# Patient Record
Sex: FEMALE | Race: WHITE | Employment: PART TIME | URBAN - NONMETROPOLITAN AREA
[De-identification: names, ages, dates, MRNs, and addresses within clinical notes are randomized per-mention and may not be internally consistent; named-entity substitution may affect disease eponyms.]

---

## 2017-02-10 PROBLEM — R00.0 TACHYCARDIA: Status: ACTIVE | Noted: 2017-02-10

## 2017-02-10 PROBLEM — J10.1 INFLUENZA A: Status: ACTIVE | Noted: 2017-02-10

## 2017-02-10 PROBLEM — J06.9 URTI (INFECTION OF THE UPPER RESPIRATORY TRACT): Status: ACTIVE | Noted: 2017-02-10

## 2017-02-11 PROBLEM — R00.0 TACHYCARDIA: Status: ACTIVE | Noted: 2017-02-11

## 2017-02-22 RX ORDER — BLOOD PRESSURE TEST KIT
1 KIT MISCELLANEOUS PRN
Qty: 1 KIT | Refills: 0 | Status: SHIPPED | OUTPATIENT
Start: 2017-02-22 | End: 2017-03-01 | Stop reason: SDUPTHER

## 2017-03-01 ENCOUNTER — OFFICE VISIT (OUTPATIENT)
Dept: CARDIOLOGY | Age: 37
End: 2017-03-01

## 2017-03-01 VITALS
HEART RATE: 116 BPM | SYSTOLIC BLOOD PRESSURE: 120 MMHG | DIASTOLIC BLOOD PRESSURE: 86 MMHG | HEIGHT: 60 IN | BODY MASS INDEX: 41.03 KG/M2 | WEIGHT: 209 LBS

## 2017-03-01 DIAGNOSIS — E66.01 MORBID OBESITY DUE TO EXCESS CALORIES (HCC): ICD-10-CM

## 2017-03-01 DIAGNOSIS — E11.9 TYPE 2 DIABETES MELLITUS WITHOUT COMPLICATION, WITH LONG-TERM CURRENT USE OF INSULIN (HCC): ICD-10-CM

## 2017-03-01 DIAGNOSIS — R00.0 TACHYCARDIA: Primary | ICD-10-CM

## 2017-03-01 DIAGNOSIS — I10 ESSENTIAL HYPERTENSION: ICD-10-CM

## 2017-03-01 DIAGNOSIS — Z79.4 TYPE 2 DIABETES MELLITUS WITHOUT COMPLICATION, WITH LONG-TERM CURRENT USE OF INSULIN (HCC): ICD-10-CM

## 2017-03-01 PROCEDURE — 93000 ELECTROCARDIOGRAM COMPLETE: CPT | Performed by: PHYSICIAN ASSISTANT

## 2017-03-01 PROCEDURE — 99213 OFFICE O/P EST LOW 20 MIN: CPT | Performed by: PHYSICIAN ASSISTANT

## 2017-03-01 RX ORDER — BLOOD PRESSURE TEST KIT
1 KIT MISCELLANEOUS PRN
Qty: 1 KIT | Refills: 0 | Status: SHIPPED | OUTPATIENT
Start: 2017-03-01

## 2017-03-06 ENCOUNTER — TELEPHONE (OUTPATIENT)
Dept: CARDIOLOGY | Age: 37
End: 2017-03-06

## 2017-03-14 ENCOUNTER — TELEPHONE (OUTPATIENT)
Dept: CARDIOLOGY | Age: 37
End: 2017-03-14

## 2017-03-23 ENCOUNTER — OFFICE VISIT (OUTPATIENT)
Dept: CARDIOLOGY | Age: 37
End: 2017-03-23

## 2017-03-23 VITALS
HEIGHT: 60 IN | BODY MASS INDEX: 41.58 KG/M2 | DIASTOLIC BLOOD PRESSURE: 72 MMHG | WEIGHT: 211.8 LBS | HEART RATE: 108 BPM | SYSTOLIC BLOOD PRESSURE: 124 MMHG

## 2017-03-23 DIAGNOSIS — R94.31 ABNORMAL HOLTER EXAM: ICD-10-CM

## 2017-03-23 DIAGNOSIS — R00.2 INTERMITTENT PALPITATIONS: ICD-10-CM

## 2017-03-23 DIAGNOSIS — R00.0 INAPPROPRIATE SINUS TACHYCARDIA: Primary | ICD-10-CM

## 2017-03-23 DIAGNOSIS — I10 ESSENTIAL HYPERTENSION: ICD-10-CM

## 2017-03-23 PROBLEM — I47.11 INAPPROPRIATE SINUS TACHYCARDIA (HCC): Status: ACTIVE | Noted: 2017-03-23

## 2017-03-23 PROCEDURE — 99214 OFFICE O/P EST MOD 30 MIN: CPT | Performed by: INTERNAL MEDICINE

## 2017-03-23 RX ORDER — LISINOPRIL 10 MG/1
10 TABLET ORAL DAILY
Qty: 30 TABLET | Refills: 5
Start: 2017-03-23 | End: 2017-03-23 | Stop reason: SDUPTHER

## 2017-03-23 RX ORDER — LISINOPRIL 10 MG/1
10 TABLET ORAL DAILY
Qty: 30 TABLET | Refills: 3 | Status: SHIPPED | OUTPATIENT
Start: 2017-03-23 | End: 2017-07-28 | Stop reason: SDUPTHER

## 2017-03-23 RX ORDER — AMLODIPINE BESYLATE 5 MG/1
5 TABLET ORAL DAILY
COMMUNITY
Start: 2017-01-24 | End: 2017-03-23

## 2017-03-23 RX ORDER — LISINOPRIL AND HYDROCHLOROTHIAZIDE 25; 20 MG/1; MG/1
1 TABLET ORAL DAILY
COMMUNITY
Start: 2017-01-24 | End: 2017-03-23

## 2017-04-05 ENCOUNTER — OFFICE VISIT (OUTPATIENT)
Dept: CARDIOLOGY | Age: 37
End: 2017-04-05

## 2017-04-05 VITALS
HEIGHT: 60 IN | HEART RATE: 97 BPM | DIASTOLIC BLOOD PRESSURE: 70 MMHG | SYSTOLIC BLOOD PRESSURE: 128 MMHG | WEIGHT: 214.13 LBS | BODY MASS INDEX: 42.04 KG/M2

## 2017-04-05 DIAGNOSIS — R00.0 TACHYCARDIA: Primary | ICD-10-CM

## 2017-04-05 PROCEDURE — 93000 ELECTROCARDIOGRAM COMPLETE: CPT | Performed by: INTERNAL MEDICINE

## 2017-04-05 PROCEDURE — 99204 OFFICE O/P NEW MOD 45 MIN: CPT | Performed by: INTERNAL MEDICINE

## 2017-04-05 ASSESSMENT — ENCOUNTER SYMPTOMS
RIGHT EYE: 0
BACK PAIN: 0
SORE THROAT: 0
BLURRED VISION: 0
DIARRHEA: 0
DOUBLE VISION: 0
VOMITING: 0
NAUSEA: 0
ABDOMINAL PAIN: 0
COUGH: 0
CONSTIPATION: 0
LEFT EYE: 0
WHEEZING: 0
SHORTNESS OF BREATH: 0

## 2017-07-18 ENCOUNTER — TELEPHONE (OUTPATIENT)
Dept: CARDIOLOGY CLINIC | Age: 37
End: 2017-07-18

## 2017-07-31 RX ORDER — LISINOPRIL 10 MG/1
TABLET ORAL
Qty: 30 TABLET | Refills: 0 | Status: SHIPPED | OUTPATIENT
Start: 2017-07-31 | End: 2017-08-01 | Stop reason: SDUPTHER

## 2017-08-01 RX ORDER — LISINOPRIL 10 MG/1
TABLET ORAL
Qty: 30 TABLET | Refills: 1 | Status: SHIPPED | OUTPATIENT
Start: 2017-08-01

## 2017-09-22 ENCOUNTER — TELEPHONE (OUTPATIENT)
Dept: CARDIOLOGY CLINIC | Age: 37
End: 2017-09-22

## 2018-05-05 ENCOUNTER — HOSPITAL ENCOUNTER (EMERGENCY)
Age: 38
Discharge: HOME | End: 2018-05-05
Payer: COMMERCIAL

## 2018-05-05 ENCOUNTER — APPOINTMENT (OUTPATIENT)
Dept: GENERAL RADIOLOGY | Age: 38
End: 2018-05-05
Payer: COMMERCIAL

## 2018-05-05 VITALS
TEMPERATURE: 98.6 F | HEART RATE: 111 BPM | RESPIRATION RATE: 18 BRPM | SYSTOLIC BLOOD PRESSURE: 148 MMHG | BODY MASS INDEX: 41.23 KG/M2 | HEIGHT: 60 IN | WEIGHT: 210 LBS | DIASTOLIC BLOOD PRESSURE: 90 MMHG | OXYGEN SATURATION: 95 %

## 2018-05-05 DIAGNOSIS — S20.212A RIB CONTUSION, LEFT, INITIAL ENCOUNTER: Primary | ICD-10-CM

## 2018-05-05 DIAGNOSIS — Z86.79 HX OF SINUS TACHYCARDIA: ICD-10-CM
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
## 2018-05-05 PROCEDURE — 6370000000 HC RX 637 (ALT 250 FOR IP): Performed by: PHYSICIAN ASSISTANT

## 2018-05-05 PROCEDURE — 6360000002 HC RX W HCPCS: Performed by: PHYSICIAN ASSISTANT

## 2018-05-05 PROCEDURE — 96372 THER/PROPH/DIAG INJ SC/IM: CPT

## 2018-05-05 PROCEDURE — 99283 EMERGENCY DEPT VISIT LOW MDM: CPT

## 2018-05-05 PROCEDURE — 71101 X-RAY EXAM UNILAT RIBS/CHEST: CPT

## 2018-05-05 RX ORDER — ORPHENADRINE CITRATE 30 MG/ML
60 INJECTION INTRAMUSCULAR; INTRAVENOUS ONCE
Status: COMPLETED | OUTPATIENT
Start: 2018-05-05 | End: 2018-05-05

## 2018-05-05 RX ORDER — IBUPROFEN 800 MG/1
800 TABLET ORAL ONCE
Status: COMPLETED | OUTPATIENT
Start: 2018-05-05 | End: 2018-05-05

## 2018-05-05 RX ORDER — IBUPROFEN 800 MG/1
800 TABLET ORAL EVERY 8 HOURS PRN
Qty: 15 TABLET | Refills: 0 | Status: SHIPPED | OUTPATIENT
Start: 2018-05-05 | End: 2019-01-09 | Stop reason: ALTCHOICE

## 2018-05-05 RX ORDER — ORPHENADRINE CITRATE 100 MG/1
100 TABLET, EXTENDED RELEASE ORAL 2 TIMES DAILY
Qty: 10 TABLET | Refills: 0 | Status: SHIPPED | OUTPATIENT
Start: 2018-05-05 | End: 2018-06-29 | Stop reason: ALTCHOICE

## 2018-05-05 RX ADMIN — IBUPROFEN 800 MG: 800 TABLET ORAL at 14:00

## 2018-05-05 RX ADMIN — ORPHENADRINE CITRATE 60 MG: 30 INJECTION INTRAMUSCULAR; INTRAVENOUS at 14:00

## 2018-05-05 ASSESSMENT — ENCOUNTER SYMPTOMS
SORE THROAT: 0
NAUSEA: 0
EYE PAIN: 0
COUGH: 0
RHINORRHEA: 0
BACK PAIN: 0
WHEEZING: 0
SHORTNESS OF BREATH: 0
VOMITING: 0
ABDOMINAL PAIN: 0
EYE DISCHARGE: 0
DIARRHEA: 0

## 2018-05-05 ASSESSMENT — PAIN DESCRIPTION - ORIENTATION: ORIENTATION: LEFT

## 2018-05-05 ASSESSMENT — PAIN DESCRIPTION - LOCATION: LOCATION: RIB CAGE

## 2018-05-05 ASSESSMENT — PAIN SCALES - GENERAL
PAINLEVEL_OUTOF10: 7
PAINLEVEL_OUTOF10: 7

## 2018-05-05 ASSESSMENT — PAIN DESCRIPTION - PAIN TYPE: TYPE: ACUTE PAIN

## 2018-06-29 ENCOUNTER — OFFICE VISIT (OUTPATIENT)
Dept: CARDIOLOGY CLINIC | Age: 38
End: 2018-06-29
Payer: COMMERCIAL

## 2018-06-29 ENCOUNTER — TELEPHONE (OUTPATIENT)
Dept: CARDIOLOGY CLINIC | Age: 38
End: 2018-06-29

## 2018-06-29 VITALS
BODY MASS INDEX: 44.27 KG/M2 | SYSTOLIC BLOOD PRESSURE: 124 MMHG | WEIGHT: 225.5 LBS | HEART RATE: 116 BPM | DIASTOLIC BLOOD PRESSURE: 84 MMHG | HEIGHT: 60 IN

## 2018-06-29 DIAGNOSIS — R94.31 ABNORMAL HOLTER EXAM: ICD-10-CM

## 2018-06-29 DIAGNOSIS — R00.0 INAPPROPRIATE SINUS TACHYCARDIA: Primary | ICD-10-CM

## 2018-06-29 DIAGNOSIS — R00.2 INTERMITTENT PALPITATIONS: ICD-10-CM

## 2018-06-29 DIAGNOSIS — I10 ESSENTIAL HYPERTENSION: ICD-10-CM

## 2018-06-29 PROCEDURE — G8427 DOCREV CUR MEDS BY ELIG CLIN: HCPCS | Performed by: INTERNAL MEDICINE

## 2018-06-29 PROCEDURE — 4004F PT TOBACCO SCREEN RCVD TLK: CPT | Performed by: INTERNAL MEDICINE

## 2018-06-29 PROCEDURE — 99214 OFFICE O/P EST MOD 30 MIN: CPT | Performed by: INTERNAL MEDICINE

## 2018-06-29 PROCEDURE — 93000 ELECTROCARDIOGRAM COMPLETE: CPT | Performed by: INTERNAL MEDICINE

## 2018-06-29 PROCEDURE — G8419 CALC BMI OUT NRM PARAM NOF/U: HCPCS | Performed by: INTERNAL MEDICINE

## 2018-06-29 RX ORDER — CARBAMAZEPINE 200 MG/1
200 TABLET ORAL 3 TIMES DAILY
COMMUNITY
End: 2018-11-01 | Stop reason: ALTCHOICE

## 2018-06-29 RX ORDER — MEDROXYPROGESTERONE ACETATE 150 MG/ML
150 INJECTION, SUSPENSION INTRAMUSCULAR
Status: ON HOLD | COMMUNITY
End: 2022-02-25 | Stop reason: HOSPADM

## 2018-06-29 RX ORDER — AMITRIPTYLINE HYDROCHLORIDE 10 MG/1
10 TABLET, FILM COATED ORAL NIGHTLY
Qty: 30 TABLET | Refills: 4 | Status: SHIPPED | OUTPATIENT
Start: 2018-06-29

## 2018-06-29 RX ORDER — AMITRIPTYLINE HYDROCHLORIDE 10 MG/1
10 TABLET, FILM COATED ORAL NIGHTLY
COMMUNITY
End: 2018-06-29 | Stop reason: SDUPTHER

## 2018-06-29 RX ORDER — METOPROLOL TARTRATE 100 MG/1
100 TABLET ORAL 2 TIMES DAILY
Qty: 60 TABLET | Refills: 4 | Status: SHIPPED | OUTPATIENT
Start: 2018-06-29 | End: 2018-11-01 | Stop reason: SDUPTHER

## 2018-06-29 NOTE — TELEPHONE ENCOUNTER
Patient called to inform us that the Ivabardine 5 mg po bid was not covered by insurance. VO per Dr. Marylou Greer to change to cardizem 30 mg tid. Prescription phoned in to [facility]. Dr. Marylou Greer please agree to VO.

## 2018-07-05 ENCOUNTER — TELEPHONE (OUTPATIENT)
Dept: CARDIOLOGY CLINIC | Age: 38
End: 2018-07-05

## 2018-07-05 NOTE — TELEPHONE ENCOUNTER
Received fax from [address] [place] [address]. Carbamazepine and Diltiazem HCL combination appears to inhibit the hepatic metabolism of carbamazepine and may lead to increase carbamazepine levels and subsequent toxicity. The combination may also lead to decreased exposure of the calcium channel blocker. Anything needed?

## 2018-07-06 NOTE — TELEPHONE ENCOUNTER
Pt called back, and states she is taking carbamazepine. She states umang jason is writing the script. Left detailed message on  Their  Office vm.

## 2018-07-09 NOTE — TELEPHONE ENCOUNTER
ROB for Pancho Caprice, CNP team to call our office back. Want to make sure they received our message.

## 2018-08-08 ENCOUNTER — APPOINTMENT (OUTPATIENT)
Dept: GENERAL RADIOLOGY | Age: 38
End: 2018-08-08
Payer: COMMERCIAL

## 2018-08-08 ENCOUNTER — HOSPITAL ENCOUNTER (EMERGENCY)
Age: 38
Discharge: HOME | End: 2018-08-08
Payer: COMMERCIAL

## 2018-08-08 VITALS
BODY MASS INDEX: 43.19 KG/M2 | WEIGHT: 220 LBS | OXYGEN SATURATION: 94 % | HEIGHT: 60 IN | HEART RATE: 112 BPM | TEMPERATURE: 97.4 F | DIASTOLIC BLOOD PRESSURE: 63 MMHG | RESPIRATION RATE: 20 BRPM | SYSTOLIC BLOOD PRESSURE: 117 MMHG

## 2018-08-08 DIAGNOSIS — M54.6 MIDLINE THORACIC BACK PAIN, UNSPECIFIED CHRONICITY: Primary | ICD-10-CM

## 2018-08-08 LAB
ALBUMIN SERPL-MCNC: 3.7 G/DL (ref 3.5–5.1)
ALP BLD-CCNC: 88 U/L (ref 38–126)
ALT SERPL-CCNC: 17 U/L (ref 11–66)
AMPHETAMINE+METHAMPHETAMINE URINE SCREEN: NEGATIVE
ANION GAP SERPL CALCULATED.3IONS-SCNC: 15 MEQ/L (ref 8–16)
AST SERPL-CCNC: 12 U/L (ref 5–40)
BACTERIA: ABNORMAL /HPF
BARBITURATE QUANTITATIVE URINE: NEGATIVE
BASOPHILS # BLD: 0.7 %
BASOPHILS ABSOLUTE: 0.1 THOU/MM3 (ref 0–0.1)
BENZODIAZEPINE QUANTITATIVE URINE: NEGATIVE
BILIRUB SERPL-MCNC: 0.5 MG/DL (ref 0.3–1.2)
BILIRUBIN DIRECT: < 0.2 MG/DL (ref 0–0.3)
BILIRUBIN URINE: NEGATIVE
BLOOD, URINE: ABNORMAL
BUN BLDV-MCNC: 7 MG/DL (ref 7–22)
CALCIUM SERPL-MCNC: 9.2 MG/DL (ref 8.5–10.5)
CANNABINOID QUANTITATIVE URINE: NEGATIVE
CASTS 2: ABNORMAL /LPF
CASTS UA: ABNORMAL /LPF
CHARACTER, URINE: ABNORMAL
CHLORIDE BLD-SCNC: 97 MEQ/L (ref 98–111)
CO2: 24 MEQ/L (ref 23–33)
COCAINE METABOLITE QUANTITATIVE URINE: NEGATIVE
COLOR: YELLOW
CREAT SERPL-MCNC: 0.5 MG/DL (ref 0.4–1.2)
CRYSTALS, UA: ABNORMAL
D-DIMER QUANTITATIVE: 377 NG/ML FEU (ref 0–500)
EKG ATRIAL RATE: 122 BPM
EKG P AXIS: 62 DEGREES
EKG P-R INTERVAL: 122 MS
EKG Q-T INTERVAL: 330 MS
EKG QRS DURATION: 82 MS
EKG QTC CALCULATION (BAZETT): 470 MS
EKG R AXIS: 54 DEGREES
EKG T AXIS: 42 DEGREES
EKG VENTRICULAR RATE: 122 BPM
EOSINOPHIL # BLD: 1.1 %
EOSINOPHILS ABSOLUTE: 0.1 THOU/MM3 (ref 0–0.4)
EPITHELIAL CELLS, UA: ABNORMAL /HPF
ERYTHROCYTE [DISTWIDTH] IN BLOOD BY AUTOMATED COUNT: 11.9 % (ref 11.5–14.5)
ERYTHROCYTE [DISTWIDTH] IN BLOOD BY AUTOMATED COUNT: 36.3 FL (ref 35–45)
GFR SERPL CREATININE-BSD FRML MDRD: > 90 ML/MIN/1.73M2
GLUCOSE BLD-MCNC: 288 MG/DL (ref 70–108)
GLUCOSE BLD-MCNC: 316 MG/DL (ref 70–108)
GLUCOSE URINE: >= 1000 MG/DL
HCT VFR BLD CALC: 46.7 % (ref 37–47)
HEMOGLOBIN: 16.5 GM/DL (ref 12–16)
IMMATURE GRANS (ABS): 0.05 THOU/MM3 (ref 0–0.07)
IMMATURE GRANULOCYTES: 0.4 %
KETONES, URINE: NEGATIVE
LEUKOCYTE ESTERASE, URINE: NEGATIVE
LIPASE: 43.8 U/L (ref 5.6–51.3)
LYMPHOCYTES # BLD: 32.1 %
LYMPHOCYTES ABSOLUTE: 4.2 THOU/MM3 (ref 1–4.8)
MCH RBC QN AUTO: 30 PG (ref 26–33)
MCHC RBC AUTO-ENTMCNC: 35.3 GM/DL (ref 32.2–35.5)
MCV RBC AUTO: 84.9 FL (ref 81–99)
MISCELLANEOUS 2: ABNORMAL
MONOCYTES # BLD: 5.2 %
MONOCYTES ABSOLUTE: 0.7 THOU/MM3 (ref 0.4–1.3)
NITRITE, URINE: NEGATIVE
NUCLEATED RED BLOOD CELLS: 0 /100 WBC
OPIATES, URINE: NEGATIVE
OSMOLALITY CALCULATION: 282 MOSMOL/KG (ref 275–300)
OXYCODONE: NEGATIVE
PH UA: 5.5
PHENCYCLIDINE QUANTITATIVE URINE: NEGATIVE
PLATELET # BLD: 370 THOU/MM3 (ref 130–400)
PLATELET ESTIMATE: ADEQUATE
PMV BLD AUTO: 9.8 FL (ref 9.4–12.4)
POTASSIUM REFLEX MAGNESIUM: 3.9 MEQ/L (ref 3.5–5.2)
PREGNANCY, SERUM: NEGATIVE
PROCALCITONIN: 0.05 NG/ML (ref 0.01–0.09)
PROTEIN UA: 100
RBC # BLD: 5.5 MILL/MM3 (ref 4.2–5.4)
RBC URINE: ABNORMAL /HPF
RENAL EPITHELIAL, UA: ABNORMAL
SCAN OF BLOOD SMEAR: NORMAL
SEG NEUTROPHILS: 60.5 %
SEGMENTED NEUTROPHILS ABSOLUTE COUNT: 7.9 THOU/MM3 (ref 1.8–7.7)
SODIUM BLD-SCNC: 136 MEQ/L (ref 135–145)
SPECIFIC GRAVITY, URINE: 1.02 (ref 1–1.03)
TOTAL PROTEIN: 7.2 G/DL (ref 6.1–8)
TROPONIN T: < 0.01 NG/ML
UROBILINOGEN, URINE: 1 EU/DL
WBC # BLD: 13 THOU/MM3 (ref 4.8–10.8)
WBC UA: ABNORMAL /HPF
YEAST: ABNORMAL

## 2018-08-08 PROCEDURE — 80307 DRUG TEST PRSMV CHEM ANLYZR: CPT

## 2018-08-08 PROCEDURE — 71046 X-RAY EXAM CHEST 2 VIEWS: CPT

## 2018-08-08 PROCEDURE — 87086 URINE CULTURE/COLONY COUNT: CPT

## 2018-08-08 PROCEDURE — 84145 PROCALCITONIN (PCT): CPT

## 2018-08-08 PROCEDURE — 83690 ASSAY OF LIPASE: CPT

## 2018-08-08 PROCEDURE — 85025 COMPLETE CBC W/AUTO DIFF WBC: CPT

## 2018-08-08 PROCEDURE — 6360000002 HC RX W HCPCS: Performed by: PHYSICIAN ASSISTANT

## 2018-08-08 PROCEDURE — 36415 COLL VENOUS BLD VENIPUNCTURE: CPT

## 2018-08-08 PROCEDURE — 96374 THER/PROPH/DIAG INJ IV PUSH: CPT

## 2018-08-08 PROCEDURE — 80048 BASIC METABOLIC PNL TOTAL CA: CPT

## 2018-08-08 PROCEDURE — 84484 ASSAY OF TROPONIN QUANT: CPT

## 2018-08-08 PROCEDURE — 81001 URINALYSIS AUTO W/SCOPE: CPT

## 2018-08-08 PROCEDURE — 80076 HEPATIC FUNCTION PANEL: CPT

## 2018-08-08 PROCEDURE — 99285 EMERGENCY DEPT VISIT HI MDM: CPT

## 2018-08-08 PROCEDURE — 82948 REAGENT STRIP/BLOOD GLUCOSE: CPT

## 2018-08-08 PROCEDURE — 85379 FIBRIN DEGRADATION QUANT: CPT

## 2018-08-08 PROCEDURE — 93005 ELECTROCARDIOGRAM TRACING: CPT | Performed by: PHYSICIAN ASSISTANT

## 2018-08-08 PROCEDURE — 2580000003 HC RX 258: Performed by: PHYSICIAN ASSISTANT

## 2018-08-08 PROCEDURE — 94640 AIRWAY INHALATION TREATMENT: CPT

## 2018-08-08 PROCEDURE — 84703 CHORIONIC GONADOTROPIN ASSAY: CPT

## 2018-08-08 RX ORDER — 0.9 % SODIUM CHLORIDE 0.9 %
1000 INTRAVENOUS SOLUTION INTRAVENOUS ONCE
Status: COMPLETED | OUTPATIENT
Start: 2018-08-08 | End: 2018-08-08

## 2018-08-08 RX ORDER — METHYLPREDNISOLONE SODIUM SUCCINATE 125 MG/2ML
80 INJECTION, POWDER, LYOPHILIZED, FOR SOLUTION INTRAMUSCULAR; INTRAVENOUS ONCE
Status: COMPLETED | OUTPATIENT
Start: 2018-08-08 | End: 2018-08-08

## 2018-08-08 RX ADMIN — ALBUTEROL SULFATE 2.5 MG: 2.5 SOLUTION RESPIRATORY (INHALATION) at 13:21

## 2018-08-08 RX ADMIN — SODIUM CHLORIDE 1000 ML: 9 INJECTION, SOLUTION INTRAVENOUS at 13:29

## 2018-08-08 RX ADMIN — METHYLPREDNISOLONE SODIUM SUCCINATE 80 MG: 125 INJECTION, POWDER, FOR SOLUTION INTRAMUSCULAR; INTRAVENOUS at 13:29

## 2018-08-08 ASSESSMENT — ENCOUNTER SYMPTOMS
COUGH: 1
RHINORRHEA: 0
DIARRHEA: 0
BACK PAIN: 1
TROUBLE SWALLOWING: 0
ABDOMINAL PAIN: 0
SORE THROAT: 0
WHEEZING: 0
VOMITING: 0
NAUSEA: 0
EYE DISCHARGE: 0
SHORTNESS OF BREATH: 0
EYE PAIN: 0

## 2018-08-08 ASSESSMENT — PAIN DESCRIPTION - DESCRIPTORS: DESCRIPTORS: ACHING;BURNING

## 2018-08-08 ASSESSMENT — PAIN DESCRIPTION - FREQUENCY: FREQUENCY: CONTINUOUS

## 2018-08-08 ASSESSMENT — PAIN DESCRIPTION - PAIN TYPE: TYPE: ACUTE PAIN

## 2018-08-08 ASSESSMENT — PAIN DESCRIPTION - LOCATION: LOCATION: BACK

## 2018-08-08 ASSESSMENT — PAIN SCALES - GENERAL: PAINLEVEL_OUTOF10: 7

## 2018-08-08 NOTE — ED PROVIDER NOTES
for appetite change, chills, fatigue and fever. HENT: Negative for congestion, ear pain, rhinorrhea, sore throat and trouble swallowing. Eyes: Negative for pain, discharge and visual disturbance. Respiratory: Positive for cough (productive). Negative for shortness of breath and wheezing. Cardiovascular: Positive for chest pain (only when she coughs). Negative for palpitations and leg swelling. Gastrointestinal: Negative for abdominal pain, diarrhea, nausea and vomiting. Genitourinary: Negative for difficulty urinating, dysuria, frequency, hematuria, urgency and vaginal discharge. Musculoskeletal: Positive for back pain. Negative for arthralgias, joint swelling and neck pain. Skin: Negative for pallor and rash. Neurological: Negative for dizziness, syncope, weakness, light-headedness, numbness and headaches. Hematological: Negative for adenopathy. Psychiatric/Behavioral: Negative for confusion and suicidal ideas. The patient is not nervous/anxious. PAST MEDICAL HISTORY    has a past medical history of Anxiety; Depression; Diabetes mellitus ([facility] [address] Hyperlipidemia; and Hypertension. SURGICAL HISTORY      has a past surgical history that includes Abscess Drainage.     CURRENT MEDICATIONS       Discharge Medication List as of 8/8/2018  3:46 PM      CONTINUE these medications which have NOT CHANGED    Details   medroxyPROGESTERone (DEPO-PROVERA) 150 MG/ML injection Inject 150 mg into the muscle every 3 monthsHistorical Med      carBAMazepine (TEGRETOL) 200 MG tablet Take 200 mg by mouth 3 times dailyHistorical Med      metoprolol (LOPRESSOR) 100 MG tablet Take 1 tablet by mouth 2 times daily Hold sbp less 110 HR less than 55, Disp-60 tablet, R-4Normal      amitriptyline (ELAVIL) 10 MG tablet Take 1 tablet by mouth nightly, Disp-30 tablet, R-4Normal      diltiazem (CARDIZEM) 30 MG tablet Take 1 tablet by mouth 3 times daily, Disp-90 tablet, R-3Phone In      ibuprofen (ADVIL;MOTRIN) 800 MG Physical Exam   Constitutional: She is oriented to person, place, and time. She appears well-developed and well-nourished. No distress. Patient is obese. HENT:   Head: Normocephalic and atraumatic. Right Ear: Tympanic membrane and external ear normal.   Left Ear: Tympanic membrane and external ear normal.   Nose: Nose normal.   Mouth/Throat: Oropharynx is clear and moist. No oropharyngeal exudate, posterior oropharyngeal edema or posterior oropharyngeal erythema. Eyes: Conjunctivae are normal. Pupils are equal, round, and reactive to light. Neck: Normal range of motion. Neck supple. No spinous process tenderness and no muscular tenderness present. Cardiovascular: Regular rhythm, S1 normal and normal pulses. Tachycardia present. Exam reveals no gallop. No murmur heard. Pulses:       Radial pulses are 2+ on the right side, and 2+ on the left side. Dorsalis pedis pulses are 2+ on the right side, and 2+ on the left side. Posterior tibial pulses are 2+ on the right side, and 2+ on the left side. Pulmonary/Chest: Effort normal. She has decreased breath sounds. She has wheezes. She has no rhonchi. She has no rales. She exhibits no tenderness. Minimal expiratory wheezes. Abdominal: Soft. Bowel sounds are normal. She exhibits no distension. There is no tenderness. There is no rebound and no guarding. Musculoskeletal: Normal range of motion. She exhibits no edema or tenderness. No thoracic bony tenderness. No reproducible pain with movement. Patient states she has burning sensation with palpation across thoracic back along braw strap line, which crosses the midline. No lesions noted. Neurological: She is alert and oriented to person, place, and time. She has normal strength. No cranial nerve deficit or sensory deficit. She exhibits normal muscle tone. Coordination normal.   Skin: Skin is warm and dry. No rash noted. No erythema.    Nursing note and vitals

## 2018-08-09 LAB
ORGANISM: ABNORMAL
URINE CULTURE REFLEX: ABNORMAL

## 2018-08-09 PROCEDURE — 93010 ELECTROCARDIOGRAM REPORT: CPT | Performed by: INTERNAL MEDICINE

## 2018-09-26 PROBLEM — J10.1 INFLUENZA A: Status: RESOLVED | Noted: 2017-02-10 | Resolved: 2018-09-26

## 2018-11-01 ENCOUNTER — OFFICE VISIT (OUTPATIENT)
Dept: CARDIOLOGY CLINIC | Age: 38
End: 2018-11-01
Payer: COMMERCIAL

## 2018-11-01 VITALS
HEIGHT: 60 IN | BODY MASS INDEX: 43.9 KG/M2 | WEIGHT: 223.6 LBS | SYSTOLIC BLOOD PRESSURE: 122 MMHG | HEART RATE: 100 BPM | DIASTOLIC BLOOD PRESSURE: 82 MMHG

## 2018-11-01 DIAGNOSIS — R00.2 INTERMITTENT PALPITATIONS: Primary | ICD-10-CM

## 2018-11-01 DIAGNOSIS — R94.31 ABNORMAL HOLTER EXAM: ICD-10-CM

## 2018-11-01 DIAGNOSIS — R00.0 INAPPROPRIATE SINUS TACHYCARDIA: ICD-10-CM

## 2018-11-01 DIAGNOSIS — I10 ESSENTIAL HYPERTENSION: ICD-10-CM

## 2018-11-01 PROCEDURE — 4004F PT TOBACCO SCREEN RCVD TLK: CPT | Performed by: INTERNAL MEDICINE

## 2018-11-01 PROCEDURE — G8427 DOCREV CUR MEDS BY ELIG CLIN: HCPCS | Performed by: INTERNAL MEDICINE

## 2018-11-01 PROCEDURE — G8484 FLU IMMUNIZE NO ADMIN: HCPCS | Performed by: INTERNAL MEDICINE

## 2018-11-01 PROCEDURE — G8417 CALC BMI ABV UP PARAM F/U: HCPCS | Performed by: INTERNAL MEDICINE

## 2018-11-01 PROCEDURE — 99214 OFFICE O/P EST MOD 30 MIN: CPT | Performed by: INTERNAL MEDICINE

## 2018-11-01 RX ORDER — METOPROLOL TARTRATE 100 MG/1
100 TABLET ORAL 2 TIMES DAILY
Qty: 180 TABLET | Refills: 3 | Status: SHIPPED | OUTPATIENT
Start: 2018-11-01

## 2018-11-01 NOTE — PROGRESS NOTES
Chief Complaint   Patient presents with    3 Month Follow-Up    Tachycardia     Originally Sinus tachycardia in a pat admitted with flu    3 month follow-up. Doing well    Pt denies:chest pain,palpitations,dizziness,peripheral edema,SOB    Pt complains of:tackycardia      Note from Dr. BR[facility] appreciated \" I discussed with the patient the option for medical management which is limited and also the option of having an ablation to modify the sinus node. Since the ablation is permanent, I want to try the medical option first.  I will provide her with samples of Corlanor since this is not covered by Medicaid. I want to see if this helps. If it does then I will discuss the option of continuing this and she will try to loose weight and stop smoking to see if her resting HR improves and if not perform a sinus node modification ablation. \"    Palpitation got better after  Corlanor  But did not cont    Denies SOB. Chest pain. Fatigue. Edema.        Patient Active Problem List   Diagnosis    Gastroenteritis    Abdominal pain, other specified site    Influenza    Tachycardia    Sepsis ([address]    Type 2 diabetes mellitus without complication, with long-term current use of insulin ([address]    Essential hypertension    Sinus tachycardia    Morbid obesity due to excess calories (HCC)    Intermittent palpitations    Abnormal Holter exam HR average is 118 bpm    Inappropriate sinus tachycardia       Past Surgical History:   Procedure Laterality Date    ABSCESS DRAINAGE         Allergies   Allergen Reactions    Codeine Hives        Family History   Problem Relation Age of Onset    High Blood Pressure Mother     Heart Disease Mother     High Cholesterol Mother     Depression Mother     Stroke Mother     Diabetes Maternal Aunt     High Blood Pressure Maternal Aunt     High Cholesterol Maternal Aunt         Social History     Social History    Marital status: Single     Spouse name: N/A    Number of children: mouth 2 times daily (with meals).  metFORMIN (GLUCOPHAGE) 1000 MG tablet Take 2,000 mg by mouth daily       diclofenac sodium 1 % GEL Apply 2 g topically 2 times daily for 10 days 1 Tube 3     No current facility-administered medications for this visit. Review of Systems -     General ROS: negative  Psychological ROS: negative  Hematological and Lymphatic ROS: No history of blood clots or bleeding disorder. Respiratory ROS: no cough, shortness of breath, or wheezing  Cardiovascular ROS: no chest pain or dyspnea on exertion  Gastrointestinal ROS: negative  Genito-Urinary ROS: no dysuria, trouble voiding, or hematuria  Musculoskeletal ROS: negative  Neurological ROS: no TIA or stroke symptoms  Dermatological ROS: negative      Blood pressure 122/82, pulse 100, height 5' (1.524 m), weight 223 lb 9.6 oz (101.4 kg). Physical Examination:    General appearance - alert, well appearing, and in no distress  Mental status - alert, oriented to person, place, and time  Neck - supple, no significant adenopathy, no JVD, or carotid bruits  Chest - clear to auscultation, no wheezes, rales or rhonchi, symmetric air entry  Heart - normal rate, regular rhythm, normal S1, S2, no murmurs, rubs, clicks or gallops  Abdomen - soft, nontender, nondistended, no masses or organomegaly  Neurological - alert, oriented, normal speech, no focal findings or movement disorder noted  Musculoskeletal - no joint tenderness, deformity or swelling  Extremities - peripheral pulses normal, no pedal edema, no clubbing or cyanosis  Skin - normal coloration and turgor, no rashes, no suspicious skin lesions noted    Lab  No results for input(s): CKTOTAL, CKMB, CKMBINDEX, TROPONINI in the last 72 hours.   CBC:   Lab Results   Component Value Date    WBC 13.0 08/08/2018    RBC 5.50 08/08/2018    RBC 4.86 05/31/2012    HGB 16.5 08/08/2018    HCT 46.7 08/08/2018    MCV 84.9 08/08/2018    MCH 30.0 08/08/2018    MCHC 35.3 08/08/2018    RDW 12.9

## 2018-11-05 ENCOUNTER — TELEPHONE (OUTPATIENT)
Dept: CARDIOLOGY CLINIC | Age: 38
End: 2018-11-05

## 2019-01-09 ENCOUNTER — HOSPITAL ENCOUNTER (EMERGENCY)
Age: 39
Discharge: HOME | End: 2019-01-09
Payer: COMMERCIAL

## 2019-01-09 VITALS
BODY MASS INDEX: 43.19 KG/M2 | HEIGHT: 60 IN | DIASTOLIC BLOOD PRESSURE: 87 MMHG | TEMPERATURE: 98.6 F | WEIGHT: 220 LBS | SYSTOLIC BLOOD PRESSURE: 152 MMHG | HEART RATE: 118 BPM | RESPIRATION RATE: 16 BRPM | OXYGEN SATURATION: 97 %

## 2019-01-09 DIAGNOSIS — K52.9 GASTROENTERITIS: Primary | ICD-10-CM

## 2019-01-09 PROCEDURE — 99212 OFFICE O/P EST SF 10 MIN: CPT

## 2019-01-09 PROCEDURE — 99212 OFFICE O/P EST SF 10 MIN: CPT | Performed by: NURSE PRACTITIONER

## 2019-01-09 RX ORDER — ONDANSETRON 4 MG/1
4 TABLET, ORALLY DISINTEGRATING ORAL EVERY 8 HOURS PRN
Qty: 6 TABLET | Refills: 0 | Status: SHIPPED | OUTPATIENT
Start: 2019-01-09

## 2019-01-09 RX ORDER — CHOLECALCIFEROL (VITAMIN D3) 125 MCG
1 CAPSULE ORAL DAILY
Qty: 30 CAPSULE | Refills: 0 | Status: SHIPPED | OUTPATIENT
Start: 2019-01-09

## 2019-01-09 RX ORDER — IBUPROFEN 800 MG/1
800 TABLET ORAL EVERY 8 HOURS PRN
Qty: 21 TABLET | Refills: 0 | Status: SHIPPED | OUTPATIENT
Start: 2019-01-09 | End: 2019-12-22

## 2019-01-09 ASSESSMENT — ENCOUNTER SYMPTOMS
SHORTNESS OF BREATH: 0
VOMITING: 1
RHINORRHEA: 0
COUGH: 0
SINUS PRESSURE: 0
ABDOMINAL PAIN: 0
DIARRHEA: 1
NAUSEA: 1
COLOR CHANGE: 0
SORE THROAT: 0

## 2019-01-09 ASSESSMENT — PAIN DESCRIPTION - DESCRIPTORS: DESCRIPTORS: HEADACHE

## 2019-01-09 ASSESSMENT — PAIN DESCRIPTION - LOCATION: LOCATION: HEAD

## 2019-01-09 ASSESSMENT — PAIN DESCRIPTION - FREQUENCY: FREQUENCY: CONTINUOUS

## 2019-02-21 ENCOUNTER — NURSE TRIAGE (OUTPATIENT)
Dept: ADMINISTRATIVE | Age: 39
End: 2019-02-21

## 2019-02-21 ENCOUNTER — HOSPITAL ENCOUNTER (EMERGENCY)
Age: 39
Discharge: HOME | End: 2019-02-21
Payer: COMMERCIAL

## 2019-02-21 ENCOUNTER — APPOINTMENT (OUTPATIENT)
Dept: GENERAL RADIOLOGY | Age: 39
End: 2019-02-21
Payer: COMMERCIAL

## 2019-02-21 VITALS
SYSTOLIC BLOOD PRESSURE: 146 MMHG | OXYGEN SATURATION: 97 % | TEMPERATURE: 98 F | RESPIRATION RATE: 16 BRPM | HEART RATE: 108 BPM | HEIGHT: 60 IN | DIASTOLIC BLOOD PRESSURE: 90 MMHG | BODY MASS INDEX: 42.8 KG/M2 | WEIGHT: 218 LBS

## 2019-02-21 DIAGNOSIS — S90.221A CONTUSION OF LESSER TOE OF RIGHT FOOT WITH DAMAGE TO NAIL, INITIAL ENCOUNTER: Primary | ICD-10-CM

## 2019-02-21 PROCEDURE — 73660 X-RAY EXAM OF TOE(S): CPT

## 2019-02-21 PROCEDURE — 99283 EMERGENCY DEPT VISIT LOW MDM: CPT

## 2019-02-21 RX ORDER — CEPHALEXIN 500 MG/1
500 CAPSULE ORAL 4 TIMES DAILY
Qty: 28 CAPSULE | Refills: 0 | Status: SHIPPED | OUTPATIENT
Start: 2019-02-21 | End: 2019-02-28

## 2019-02-21 ASSESSMENT — ENCOUNTER SYMPTOMS
COUGH: 0
NAUSEA: 0
DIARRHEA: 0
SORE THROAT: 0
EYE DISCHARGE: 0
BACK PAIN: 0
COLOR CHANGE: 1
EYE PAIN: 0
VOMITING: 0
SHORTNESS OF BREATH: 0
WHEEZING: 0
ABDOMINAL PAIN: 0
RHINORRHEA: 0

## 2019-02-21 ASSESSMENT — PAIN DESCRIPTION - PAIN TYPE: TYPE: ACUTE PAIN

## 2019-02-21 ASSESSMENT — PAIN DESCRIPTION - LOCATION: LOCATION: TOE (COMMENT WHICH ONE)

## 2019-02-21 ASSESSMENT — PAIN DESCRIPTION - DESCRIPTORS: DESCRIPTORS: ACHING

## 2019-02-21 ASSESSMENT — PAIN DESCRIPTION - FREQUENCY: FREQUENCY: CONTINUOUS

## 2019-02-21 ASSESSMENT — PAIN DESCRIPTION - ORIENTATION: ORIENTATION: RIGHT

## 2019-05-23 ENCOUNTER — HOSPITAL ENCOUNTER (EMERGENCY)
Age: 39
Discharge: HOME | End: 2019-05-23
Payer: COMMERCIAL

## 2019-05-23 ENCOUNTER — APPOINTMENT (OUTPATIENT)
Dept: GENERAL RADIOLOGY | Age: 39
End: 2019-05-23
Payer: COMMERCIAL

## 2019-05-23 VITALS
WEIGHT: 200 LBS | HEART RATE: 115 BPM | SYSTOLIC BLOOD PRESSURE: 157 MMHG | OXYGEN SATURATION: 98 % | DIASTOLIC BLOOD PRESSURE: 96 MMHG | HEIGHT: 60 IN | BODY MASS INDEX: 39.27 KG/M2 | TEMPERATURE: 98.1 F | RESPIRATION RATE: 18 BRPM

## 2019-05-23 DIAGNOSIS — R00.0 TACHYCARDIA: ICD-10-CM

## 2019-05-23 DIAGNOSIS — S20.212A RIB CONTUSION, LEFT, INITIAL ENCOUNTER: Primary | ICD-10-CM

## 2019-05-23 PROCEDURE — 6370000000 HC RX 637 (ALT 250 FOR IP): Performed by: PHYSICIAN ASSISTANT

## 2019-05-23 PROCEDURE — 99283 EMERGENCY DEPT VISIT LOW MDM: CPT

## 2019-05-23 PROCEDURE — 71101 X-RAY EXAM UNILAT RIBS/CHEST: CPT

## 2019-05-23 RX ORDER — LIDOCAINE 4 G/G
1 PATCH TOPICAL DAILY
Status: DISCONTINUED | OUTPATIENT
Start: 2019-05-23 | End: 2019-05-23 | Stop reason: HOSPADM

## 2019-05-23 RX ORDER — LIDOCAINE 50 MG/G
1 PATCH TOPICAL DAILY
Qty: 30 PATCH | Refills: 0 | Status: SHIPPED | OUTPATIENT
Start: 2019-05-23

## 2019-05-23 ASSESSMENT — ENCOUNTER SYMPTOMS
WHEEZING: 0
DIARRHEA: 0
RHINORRHEA: 0
ABDOMINAL PAIN: 0
VOMITING: 0
BACK PAIN: 0
SORE THROAT: 0
COUGH: 0
EYE PAIN: 0
EYE DISCHARGE: 0
SHORTNESS OF BREATH: 0
NAUSEA: 0

## 2019-05-23 ASSESSMENT — PAIN DESCRIPTION - ORIENTATION: ORIENTATION: LEFT

## 2019-05-23 ASSESSMENT — PAIN DESCRIPTION - LOCATION: LOCATION: RIB CAGE

## 2019-05-23 ASSESSMENT — PAIN SCALES - GENERAL: PAINLEVEL_OUTOF10: 8

## 2019-05-23 ASSESSMENT — PAIN DESCRIPTION - PAIN TYPE: TYPE: ACUTE PAIN

## 2019-05-23 NOTE — ED PROVIDER NOTES
[facility]  eMERGENCY dEPARTMENT eNCOUnter          CHIEF COMPLAINT       Chief Complaint   Patient presents with    Rib Pain     left       Nurses Notes reviewed and I agree except as noted in the HPI. HISTORY OF PRESENT ILLNESS    Jenny Boyd is a 41 y.o. female who presents to the Emergency Department for the evaluation of left rib pain. The patient reports she was leaning over her couch when she slipped and fell landing on her left ribs 2 days ago. The patient reports the pain as 8/10. The pain is worse with movement including coughing. The patient has no further symptoms or complaints at initial encounter. The HPI was provided by the patient. REVIEW OF SYSTEMS     Review of Systems   Constitutional: Negative for appetite change, chills, fatigue and fever. HENT: Negative for congestion, ear pain, rhinorrhea and sore throat. Eyes: Negative for pain, discharge and visual disturbance. Respiratory: Negative for cough, shortness of breath and wheezing. Cardiovascular: Negative for chest pain, palpitations and leg swelling. Gastrointestinal: Negative for abdominal pain, diarrhea, nausea and vomiting. Genitourinary: Negative for difficulty urinating, dysuria, hematuria and vaginal discharge. Musculoskeletal: Positive for arthralgias (left ribs). Negative for back pain, joint swelling and neck pain. Skin: Negative for pallor and rash. Neurological: Negative for dizziness, syncope, weakness, light-headedness, numbness and headaches. Hematological: Negative for adenopathy. Psychiatric/Behavioral: Negative for confusion and suicidal ideas. The patient is not nervous/anxious. PAST MEDICAL HISTORY    has a past medical history of Anxiety, Depression, Diabetes mellitus ([address] Hyperlipidemia, and Hypertension. SURGICAL HISTORY      has a past surgical history that includes Abscess Drainage.     CURRENT MEDICATIONS       Previous Medications    AMITRIPTYLINE (ELAVIL) 10 MG TABLET    Take 1 tablet by mouth nightly    ASPIRIN 81 MG TABLET    Take 81 mg by mouth daily. ATORVASTATIN (LIPITOR) 40 MG TABLET    Take 40 mg by mouth nightly     BLOOD PRESSURE MONITOR KIT    1 kit by Does not apply route as needed (so pt may check her blood pressure when needed)    DICLOFENAC SODIUM 1 % GEL    Apply 2 g topically 2 times daily for 10 days    DILTIAZEM (CARDIZEM) 30 MG TABLET    Take 1 tablet by mouth 3 times daily    GABAPENTIN (NEURONTIN) 800 MG TABLET    Take 800 mg by mouth 3 times daily. GLIMEPIRIDE (AMARYL) 4 MG TABLET    Take 4 mg by mouth 2 times daily (with meals). IBUPROFEN (ADVIL;MOTRIN) 800 MG TABLET    Take 1 tablet by mouth every 8 hours as needed for Pain or Fever    INSULIN ASPART (NOVOLOG) 100 UNIT/ML INJECTION VIAL    Inject 25 Units into the skin 3 times daily (before meals) Indications: Inject 25 units plus HPWO sliding scale three times a day    INSULIN GLARGINE (LANTUS) 100 UNIT/ML INJECTION VIAL    Inject 30 Units into the skin 2 times daily    IVABRADINE (CORLANOR) 5 MG TABS TABLET    Take 1 tablet by mouth 2 times daily (with meals)    LACTOBACILLUS (PROBIOTIC ACIDOPHILUS) CAPS    Take 1 capsule by mouth daily    LISINOPRIL (PRINIVIL;ZESTRIL) 10 MG TABLET    take 1 tablet by mouth once daily    MEDROXYPROGESTERONE (DEPO-PROVERA) 150 MG/ML INJECTION    Inject 150 mg into the muscle every 3 months    METFORMIN (GLUCOPHAGE) 1000 MG TABLET    Take 2,000 mg by mouth daily     METOPROLOL (LOPRESSOR) 100 MG TABLET    Take 1 tablet by mouth 2 times daily Hold sbp less 110 HR less than 55    OLANZAPINE (ZYPREXA) 5 MG TABLET    Take 10 mg by mouth nightly     ONDANSETRON (ZOFRAN ODT) 4 MG DISINTEGRATING TABLET    Take 1 tablet by mouth every 8 hours as needed for Nausea or Vomiting       ALLERGIES     is allergic to codeine. FAMILY HISTORY      indicated that her mother is alive. She indicated that her father is alive.  She indicated that the status of back: Normal. She exhibits normal range of motion and no tenderness. Thoracic back: Normal. She exhibits normal range of motion and no tenderness. Lumbar back: Normal. She exhibits normal range of motion and no tenderness. Neurological: She is alert and oriented to person, place, and time. She exhibits normal muscle tone. Coordination normal.   Skin: Skin is warm and dry. No rash noted. She is not diaphoretic. Nursing note and vitals reviewed. DIFFERENTIAL DIAGNOSIS:   Fracture, contusion, strain    DIAGNOSTIC RESULTS     EKG: All EKG's are interpreted by the Emergency Department Physician who either signs or Co-signs this chart in theabsence of a cardiologist.    None    RADIOLOGY:non-plain film images(s) such as CT, Ultrasound and MRI are read by the radiologist.    XR RIBS LEFT INCLUDE CHEST (MIN 3 VIEWS)   Final Result    No acute rib fracture. No acute cardiopulmonary disease. **This report has been created using voice recognition software. It may contain minor errors which are inherent in voice recognition technology. **      Final report electronically signed by Dr. Alvarez Ag on 5/23/2019 5:44 PM          LABS:     Labs Reviewed - No data to display    EMERGENCY DEPARTMENT COURSE:   Vitals:    Vitals:    05/23/19 1556   BP: (!) 157/96   Pulse: 115   Resp: 18   Temp: 98.1 °F (36.7 °C)   TempSrc: Oral   SpO2: 98%   Weight: 200 lb (90.7 kg)   Height: 5' (1.524 m)       4:32 PM: The patient was seen and evaluated. MDM:  The patient was seen and evaluated by me at bedside for left rib pain. The patient arrived in no acute distress and in stable condition. Within the department, I observed the patient's vital signs. On exam, I appreciated left lateral rib tenderness. There is no abdominal or spinal tenderness. Appropriate imaging studies were ordered and reviewed. XR ribs revealed no acute fracture. The patient was treated with Lidocaine patch in the ED.  I explained my proposed course of treatment to the patient, who was amenable to my decision, and I answered all questions. The patient was discharged home with prescriptions for Lidocaine patch and Lodine. The patient is to follow up with PCP in 1 day as discussed. The patient is instructed to return to the emergency department for any worsening or otherwise change in their symptoms. Pt does have some tachycardia. We do believe It to be to pain. She is not on any blood thinners. She states her HR always is high and on her previous visits this was true. Her HR did remain at 115. I did discuss that case with Dr Yosef Collado and he agrees with dispostion. She is not interested in any workup. She states she takes meds for this and just wants an x ray. CRITICAL CARE:   None     CONSULTS:  None    PROCEDURES:  None    FINAL IMPRESSION      1. Rib contusion, left, initial encounter    2. Tachycardia          DISPOSITION/PLAN   Discharge    PATIENT REFERRED TO:  ROBBIE Rubi - [facility]  [address]  [phone]    In 1 day        DISCHARGE MEDICATIONS:  New Prescriptions    ETODOLAC (LODINE) 300 MG CAPSULE    Take 1 capsule by mouth every 8 hours    LIDOCAINE (LIDODERM) 5 %    Place 1 patch onto the skin daily 12 hours on, 12 hours off. (Please note that portions of this note were completed with a voice recognition program.  Effortswere made to edit the dictations but occasionally words are mis-transcribed.)    The patient was given an opportunity to see the Emergency Attending. The patient voiced understanding that I was a Mid-Level Provider and was in agreement with being seen independently by myself. Scribe:  Bandar Hussein 5/23/19 4:32 PM Scribing for and in the presence ofSilviano James PA-C.     Signed by: Marc Piek, 05/23/19 6:12 PM    Provider:  I personally performed the services described in the documentation, reviewed and edited the documentation which was dictated to the scribe in my presence, and it accurately records my words andactions.     Gilda Celeste PA-C 5/23/19 6:12 PM        SUGEY Barrow  05/23/19 1811

## 2019-06-27 ENCOUNTER — HOSPITAL ENCOUNTER (OUTPATIENT)
Age: 39
Setting detail: SPECIMEN
Discharge: HOME | End: 2019-06-27
Payer: COMMERCIAL

## 2019-06-27 LAB
ABSOLUTE EOS #: 0.65 K/UL (ref 0–0.4)
ABSOLUTE IMMATURE GRANULOCYTE: 0 K/UL (ref 0–0.3)
ABSOLUTE LYMPH #: 5.02 K/UL (ref 1.1–3.7)
ABSOLUTE MONO #: 0.65 K/UL (ref 0.1–0.8)
ALBUMIN SERPL-MCNC: 3.9 G/DL (ref 3.5–5.2)
ALBUMIN/GLOBULIN RATIO: 1.4 (ref 1–2.5)
ALP BLD-CCNC: 87 U/L (ref 35–104)
ALT SERPL-CCNC: 14 U/L (ref 5–33)
ANION GAP SERPL CALCULATED.3IONS-SCNC: 15 MMOL/L (ref 9–17)
AST SERPL-CCNC: 10 U/L
BASOPHILS # BLD: 1 % (ref 0–2)
BASOPHILS ABSOLUTE: 0.16 K/UL (ref 0–0.2)
BILIRUB SERPL-MCNC: 0.46 MG/DL (ref 0.3–1.2)
BUN BLDV-MCNC: 12 MG/DL (ref 6–20)
BUN/CREAT BLD: ABNORMAL (ref 9–20)
CALCIUM SERPL-MCNC: 9.1 MG/DL (ref 8.6–10.4)
CHLORIDE BLD-SCNC: 99 MMOL/L (ref 98–107)
CHOLESTEROL/HDL RATIO: 4.3
CHOLESTEROL: 137 MG/DL
CO2: 21 MMOL/L (ref 20–31)
CREAT SERPL-MCNC: 0.37 MG/DL (ref 0.5–0.9)
DIFFERENTIAL TYPE: ABNORMAL
EOSINOPHILS RELATIVE PERCENT: 4 % (ref 1–4)
GFR AFRICAN AMERICAN: >60 ML/MIN
GFR NON-AFRICAN AMERICAN: >60 ML/MIN
GFR SERPL CREATININE-BSD FRML MDRD: ABNORMAL ML/MIN/{1.73_M2}
GFR SERPL CREATININE-BSD FRML MDRD: ABNORMAL ML/MIN/{1.73_M2}
GLUCOSE BLD-MCNC: 237 MG/DL (ref 70–99)
HCT VFR BLD CALC: 50.5 % (ref 36.3–47.1)
HDLC SERPL-MCNC: 32 MG/DL
HEMOGLOBIN: 15.7 G/DL (ref 11.9–15.1)
HIV AG/AB: NONREACTIVE
IMMATURE GRANULOCYTES: 0 %
LDL CHOLESTEROL: 73 MG/DL (ref 0–130)
LYMPHOCYTES # BLD: 31 % (ref 24–44)
MCH RBC QN AUTO: 29.6 PG (ref 25.2–33.5)
MCHC RBC AUTO-ENTMCNC: 31.1 G/DL (ref 28.4–34.8)
MCV RBC AUTO: 95.3 FL (ref 82.6–102.9)
MONOCYTES # BLD: 4 % (ref 1–7)
MORPHOLOGY: NORMAL
NRBC AUTOMATED: 0 PER 100 WBC
PDW BLD-RTO: 12.2 % (ref 11.8–14.4)
PLATELET # BLD: 417 K/UL (ref 138–453)
PLATELET ESTIMATE: ABNORMAL
PMV BLD AUTO: 10.3 FL (ref 8.1–13.5)
POTASSIUM SERPL-SCNC: 4.1 MMOL/L (ref 3.7–5.3)
RBC # BLD: 5.3 M/UL (ref 3.95–5.11)
RBC # BLD: ABNORMAL 10*6/UL
SEG NEUTROPHILS: 60 % (ref 36–66)
SEGMENTED NEUTROPHILS ABSOLUTE COUNT: 9.72 K/UL (ref 1.8–7.7)
SODIUM BLD-SCNC: 135 MMOL/L (ref 135–144)
TOTAL PROTEIN: 6.6 G/DL (ref 6.4–8.3)
TRIGL SERPL-MCNC: 159 MG/DL
TSH SERPL DL<=0.05 MIU/L-ACNC: 1.28 MIU/L (ref 0.3–5)
VLDLC SERPL CALC-MCNC: ABNORMAL MG/DL (ref 1–30)
WBC # BLD: 16.2 K/UL (ref 3.5–11.3)
WBC # BLD: ABNORMAL 10*3/UL

## 2019-07-20 ENCOUNTER — HOSPITAL ENCOUNTER (EMERGENCY)
Age: 39
Discharge: HOME | End: 2019-07-20
Payer: COMMERCIAL

## 2019-07-20 VITALS
DIASTOLIC BLOOD PRESSURE: 91 MMHG | SYSTOLIC BLOOD PRESSURE: 164 MMHG | TEMPERATURE: 98.1 F | BODY MASS INDEX: 39.27 KG/M2 | HEART RATE: 100 BPM | RESPIRATION RATE: 19 BRPM | HEIGHT: 60 IN | OXYGEN SATURATION: 95 % | WEIGHT: 200 LBS

## 2019-07-20 DIAGNOSIS — L72.3 INFECTED SEBACEOUS CYST: Primary | ICD-10-CM

## 2019-07-20 DIAGNOSIS — L08.9 INFECTED SEBACEOUS CYST: Primary | ICD-10-CM

## 2019-07-20 PROCEDURE — 99283 EMERGENCY DEPT VISIT LOW MDM: CPT

## 2019-07-20 RX ORDER — TRAMADOL HYDROCHLORIDE 50 MG/1
50 TABLET ORAL EVERY 6 HOURS PRN
Qty: 12 TABLET | Refills: 0 | Status: SHIPPED | OUTPATIENT
Start: 2019-07-20 | End: 2019-07-23

## 2019-07-20 RX ORDER — SULFAMETHOXAZOLE AND TRIMETHOPRIM 800; 160 MG/1; MG/1
1 TABLET ORAL 2 TIMES DAILY
Qty: 20 TABLET | Refills: 0 | Status: SHIPPED | OUTPATIENT
Start: 2019-07-20 | End: 2019-07-30

## 2019-07-20 RX ORDER — CEPHALEXIN 500 MG/1
500 CAPSULE ORAL 3 TIMES DAILY
Qty: 21 CAPSULE | Refills: 0 | Status: SHIPPED | OUTPATIENT
Start: 2019-07-20 | End: 2019-07-27

## 2019-07-20 ASSESSMENT — ENCOUNTER SYMPTOMS
BACK PAIN: 0
WHEEZING: 0
EYE PAIN: 0
COUGH: 0
ABDOMINAL PAIN: 0
SHORTNESS OF BREATH: 0
VOMITING: 0
EYE DISCHARGE: 0
SORE THROAT: 0
RHINORRHEA: 0
DIARRHEA: 0
NAUSEA: 0

## 2019-07-20 ASSESSMENT — PAIN DESCRIPTION - PAIN TYPE: TYPE: ACUTE PAIN

## 2019-07-20 ASSESSMENT — PAIN DESCRIPTION - LOCATION: LOCATION: OTHER (COMMENT)

## 2019-07-20 ASSESSMENT — PAIN DESCRIPTION - ORIENTATION: ORIENTATION: LEFT

## 2019-07-20 ASSESSMENT — PAIN DESCRIPTION - DESCRIPTORS: DESCRIPTORS: BURNING

## 2019-07-20 ASSESSMENT — PAIN SCALES - GENERAL: PAINLEVEL_OUTOF10: 7

## 2019-12-22 ENCOUNTER — APPOINTMENT (OUTPATIENT)
Dept: GENERAL RADIOLOGY | Age: 39
End: 2019-12-22
Payer: COMMERCIAL

## 2019-12-22 ENCOUNTER — HOSPITAL ENCOUNTER (EMERGENCY)
Age: 39
Discharge: HOME | End: 2019-12-22
Payer: COMMERCIAL

## 2019-12-22 VITALS
RESPIRATION RATE: 14 BRPM | HEART RATE: 116 BPM | WEIGHT: 217 LBS | OXYGEN SATURATION: 97 % | DIASTOLIC BLOOD PRESSURE: 89 MMHG | TEMPERATURE: 97.9 F | HEIGHT: 60 IN | SYSTOLIC BLOOD PRESSURE: 165 MMHG | BODY MASS INDEX: 42.6 KG/M2

## 2019-12-22 DIAGNOSIS — S30.0XXA CONTUSION OF COCCYX, INITIAL ENCOUNTER: ICD-10-CM

## 2019-12-22 DIAGNOSIS — S20.229A CONTUSION OF BACK, UNSPECIFIED LATERALITY, INITIAL ENCOUNTER: Primary | ICD-10-CM

## 2019-12-22 PROCEDURE — 72100 X-RAY EXAM L-S SPINE 2/3 VWS: CPT

## 2019-12-22 PROCEDURE — 99283 EMERGENCY DEPT VISIT LOW MDM: CPT

## 2019-12-22 PROCEDURE — 72220 X-RAY EXAM SACRUM TAILBONE: CPT

## 2019-12-22 RX ORDER — IBUPROFEN 600 MG/1
600 TABLET ORAL 3 TIMES DAILY PRN
Qty: 30 TABLET | Refills: 0 | Status: SHIPPED | OUTPATIENT
Start: 2019-12-22

## 2019-12-22 ASSESSMENT — ENCOUNTER SYMPTOMS
BACK PAIN: 1
NAUSEA: 0
VOMITING: 0
ABDOMINAL PAIN: 0
SHORTNESS OF BREATH: 0

## 2019-12-22 ASSESSMENT — PAIN SCALES - GENERAL: PAINLEVEL_OUTOF10: 6

## 2019-12-22 ASSESSMENT — PAIN DESCRIPTION - DESCRIPTORS: DESCRIPTORS: SHARP

## 2019-12-22 ASSESSMENT — PAIN DESCRIPTION - ORIENTATION: ORIENTATION: LOWER

## 2019-12-22 ASSESSMENT — PAIN DESCRIPTION - LOCATION: LOCATION: SACRUM;BACK

## 2019-12-22 ASSESSMENT — PAIN DESCRIPTION - FREQUENCY: FREQUENCY: CONTINUOUS

## 2019-12-22 ASSESSMENT — PAIN DESCRIPTION - PAIN TYPE: TYPE: ACUTE PAIN

## 2020-03-10 ENCOUNTER — HOSPITAL ENCOUNTER (EMERGENCY)
Age: 40
Discharge: HOME | End: 2020-03-10
Payer: COMMERCIAL

## 2020-03-10 VITALS
SYSTOLIC BLOOD PRESSURE: 137 MMHG | HEART RATE: 120 BPM | RESPIRATION RATE: 18 BRPM | TEMPERATURE: 97.5 F | OXYGEN SATURATION: 94 % | HEIGHT: 60 IN | BODY MASS INDEX: 39.27 KG/M2 | DIASTOLIC BLOOD PRESSURE: 68 MMHG | WEIGHT: 200 LBS

## 2020-03-10 PROCEDURE — 99212 OFFICE O/P EST SF 10 MIN: CPT

## 2020-03-10 PROCEDURE — 99213 OFFICE O/P EST LOW 20 MIN: CPT | Performed by: NURSE PRACTITIONER

## 2020-03-10 RX ORDER — PREGABALIN 100 MG/1
200 CAPSULE ORAL 2 TIMES DAILY
COMMUNITY

## 2020-03-10 RX ORDER — SULFAMETHOXAZOLE AND TRIMETHOPRIM 800; 160 MG/1; MG/1
1 TABLET ORAL 2 TIMES DAILY
Qty: 20 TABLET | Refills: 0 | Status: SHIPPED | OUTPATIENT
Start: 2020-03-10 | End: 2020-03-20

## 2020-03-10 ASSESSMENT — PAIN SCALES - GENERAL: PAINLEVEL_OUTOF10: 10

## 2020-03-10 ASSESSMENT — ENCOUNTER SYMPTOMS
SHORTNESS OF BREATH: 0
COUGH: 0

## 2020-03-10 ASSESSMENT — PAIN DESCRIPTION - PAIN TYPE: TYPE: ACUTE PAIN

## 2020-03-10 ASSESSMENT — PAIN DESCRIPTION - DESCRIPTORS: DESCRIPTORS: ACHING;SHOOTING

## 2020-03-10 ASSESSMENT — PAIN DESCRIPTION - LOCATION: LOCATION: NECK

## 2020-03-10 ASSESSMENT — PAIN DESCRIPTION - FREQUENCY: FREQUENCY: CONTINUOUS

## 2020-03-10 NOTE — ED NOTES
All discharge education and information given. Pt instructed to go to ED for any shortness of breath, chest pain or increase in neck pain. Verbalized Understanding. Left stable.      Josué Reyna RN  03/10/20 1941

## 2020-03-10 NOTE — ED PROVIDER NOTES
[address] Encounter      CHIEFCOMPLAINT       Chief Complaint   Patient presents with    Abscess     neck       Nurses Notes reviewed and I agree except as noted in the HPI. HISTORY OF PRESENT ILLNESS   Jasmyne Long is a 46 y.o. female who presents for an evaluation of an abscess on her neck that has been present for 3-4 days. Patient reports a history of cysts and abscesses. Patient is requesting pain medication. Denies history of MRSA. REVIEW OF SYSTEMS     Review of Systems   Constitutional: Negative for chills and fever. Respiratory: Negative for cough and shortness of breath. Cardiovascular: Negative for chest pain. Skin: Positive for wound. Negative for rash. Allergic/Immunologic: Negative for environmental allergies and food allergies. Neurological: Negative for headaches. PAST MEDICAL HISTORY         Diagnosis Date    Anxiety     Depression     Diabetes mellitus ([facility] [address]     Hyperlipidemia     Hypertension        SURGICAL HISTORY     Patient  has a past surgical history that includes Abscess Drainage. CURRENT MEDICATIONS       Discharge Medication List as of 3/10/2020  7:39 PM      CONTINUE these medications which have NOT CHANGED    Details   pregabalin (LYRICA) 100 MG capsule Take 200 mg by mouth 2 times daily. Historical Med      diltiazem (CARDIZEM) 30 MG tablet Take 1 tablet by mouth 3 times daily, Disp-270 tablet, R-3Normal      ivabradine (CORLANOR) 5 MG TABS tablet Take 1 tablet by mouth 2 times daily (with meals), Disp-180 tablet, R-3Normal      amitriptyline (ELAVIL) 10 MG tablet Take 1 tablet by mouth nightly, Disp-30 tablet, R-4Normal      lisinopril (PRINIVIL;ZESTRIL) 10 MG tablet take 1 tablet by mouth once daily, Disp-30 tablet, R-1Normal      OLANZapine (ZYPREXA) 5 MG tablet Take 10 mg by mouth nightly Historical Med      atorvastatin (LIPITOR) 40 MG tablet Take 40 mg by mouth nightly Historical Med      aspirin 81 MG tablet Take 81 mg by mouth daily. glimepiride (AMARYL) 4 MG tablet Take 4 mg by mouth 2 times daily (with meals). metFORMIN (GLUCOPHAGE) 1000 MG tablet Take 2,000 mg by mouth 2 times daily (with meals) Historical Med      ibuprofen (ADVIL;MOTRIN) 600 MG tablet Take 1 tablet by mouth 3 times daily as needed for Pain, Disp-30 tablet, R-0Print      lidocaine (LIDODERM) 5 % Place 1 patch onto the skin daily 12 hours on, 12 hours off., Disp-30 patch, R-0Print      ondansetron (ZOFRAN ODT) 4 MG disintegrating tablet Take 1 tablet by mouth every 8 hours as needed for Nausea or Vomiting, Disp-6 tablet, R-0Normal      Lactobacillus (PROBIOTIC ACIDOPHILUS) CAPS Take 1 capsule by mouth daily, Disp-30 capsule, R-0Normal      metoprolol (LOPRESSOR) 100 MG tablet Take 1 tablet by mouth 2 times daily Hold sbp less 110 HR less than 55, Disp-180 tablet, R-3Normal      diclofenac sodium 1 % GEL Apply 2 g topically 2 times daily for 10 days, Topical, 2 TIMES DAILY Starting Wed 8/8/2018, Until Sat 8/18/2018, 20 doses, Disp-1 Tube, R-3, Print      medroxyPROGESTERone (DEPO-PROVERA) 150 MG/ML injection Inject 150 mg into the muscle every 3 monthsHistorical Med      Blood Pressure Monitor KIT PRN Starting 3/1/2017, Until Discontinued, Disp-1 kit, R-0, Print      insulin glargine (LANTUS) 100 UNIT/ML injection vial Inject 30 Units into the skin 2 times daily, Disp-1 vial, R-3      insulin aspart (NOVOLOG) 100 UNIT/ML injection vial Inject 25 Units into the skin 3 times daily (before meals) Indications: Inject 25 units plus HPWO sliding scale three times a day      gabapentin (NEURONTIN) 800 MG tablet Take 800 mg by mouth 3 times daily. ALLERGIES     Patient is is allergic to codeine. FAMILY HISTORY     Patient'sfamily history includes Depression in her mother; Diabetes in her maternal aunt;  Heart Disease in her mother; High Blood Pressure in her maternal aunt and mother; High Cholesterol in her maternal aunt and mother; Stroke in her mother. SOCIAL HISTORY     Patient  reports that she has been smoking cigarettes. She has a 15.00 pack-year smoking history. She has never used smokeless tobacco. She reports that she does not drink alcohol or use drugs. PHYSICAL EXAM     ED TRIAGE VITALS  BP: 137/68, Temp: 97.5 °F (36.4 °C), Pulse: 120, Resp: 18, SpO2: 94 %  Physical Exam  Vitals signs and nursing note reviewed. Constitutional:       General: She is not in acute distress. Appearance: Normal appearance. She is well-developed. HENT:      Head: Normocephalic and atraumatic. Mouth/Throat:      Lips: Pink. Mouth: Mucous membranes are moist.   Eyes:      Conjunctiva/sclera: Conjunctivae normal.      Right eye: Right conjunctiva is not injected. Left eye: Left conjunctiva is not injected. Pupils: Pupils are equal.   Neck:      Musculoskeletal: Normal range of motion. Cardiovascular:      Rate and Rhythm: Normal rate. Pulmonary:      Effort: Pulmonary effort is normal.   Skin:     General: Skin is warm and dry. Findings: Abscess (back of neck indurated with scarred center the size of a dime, no bleeding or drainage) present. No rash (on exposed surfaces). Neurological:      Mental Status: She is alert and oriented to person, place, and time. Psychiatric:         Speech: Speech normal.         Behavior: Behavior is cooperative. DIAGNOSTIC RESULTS   Labs:No results found for this visit on 03/10/20. IMAGING:  No orders to display     URGENT CARE COURSE:     Vitals:    03/10/20 1928   BP: 137/68   Pulse: 120   Resp: 18   Temp: 97.5 °F (36.4 °C)   TempSrc: Temporal   SpO2: 94%   Weight: 200 lb (90.7 kg)   Height: 5' (1.524 m)       Medications - No data to display  PROCEDURES:  FINALIMPRESSION      1. Abscess of neck        DISPOSITION/PLAN   DISPOSITION    Discharge   Advised patient to apply warm compresses and take antibiotic as prescribed. Follow with PCP as scheduled this Friday.

## 2020-09-15 ENCOUNTER — HOSPITAL ENCOUNTER (OUTPATIENT)
Age: 40
Setting detail: SPECIMEN
Discharge: HOME | End: 2020-09-15
Payer: COMMERCIAL

## 2020-09-15 LAB
ABSOLUTE EOS #: 0.19 K/UL (ref 0–0.44)
ABSOLUTE IMMATURE GRANULOCYTE: 0.08 K/UL (ref 0–0.3)
ABSOLUTE LYMPH #: 4.44 K/UL (ref 1.1–3.7)
ABSOLUTE MONO #: 0.93 K/UL (ref 0.1–1.2)
ALBUMIN SERPL-MCNC: 4 G/DL (ref 3.5–5.2)
ALBUMIN/GLOBULIN RATIO: 1.3 (ref 1–2.5)
ALP BLD-CCNC: 61 U/L (ref 35–104)
ALT SERPL-CCNC: 16 U/L (ref 5–33)
ANION GAP SERPL CALCULATED.3IONS-SCNC: 12 MMOL/L (ref 9–17)
AST SERPL-CCNC: 10 U/L
BASOPHILS # BLD: 1 % (ref 0–2)
BASOPHILS ABSOLUTE: 0.1 K/UL (ref 0–0.2)
BILIRUB SERPL-MCNC: 0.31 MG/DL (ref 0.3–1.2)
BUN BLDV-MCNC: 9 MG/DL (ref 6–20)
BUN/CREAT BLD: ABNORMAL (ref 9–20)
CALCIUM SERPL-MCNC: 9.5 MG/DL (ref 8.6–10.4)
CHLORIDE BLD-SCNC: 103 MMOL/L (ref 98–107)
CHOLESTEROL/HDL RATIO: 4
CHOLESTEROL: 132 MG/DL
CO2: 23 MMOL/L (ref 20–31)
CREAT SERPL-MCNC: 0.41 MG/DL (ref 0.5–0.9)
DIFFERENTIAL TYPE: ABNORMAL
EOSINOPHILS RELATIVE PERCENT: 1 % (ref 1–4)
GFR AFRICAN AMERICAN: >60 ML/MIN
GFR NON-AFRICAN AMERICAN: >60 ML/MIN
GFR SERPL CREATININE-BSD FRML MDRD: ABNORMAL ML/MIN/{1.73_M2}
GFR SERPL CREATININE-BSD FRML MDRD: ABNORMAL ML/MIN/{1.73_M2}
GLUCOSE BLD-MCNC: 181 MG/DL (ref 70–99)
HCT VFR BLD CALC: 45.8 % (ref 36.3–47.1)
HDLC SERPL-MCNC: 33 MG/DL
HEMOGLOBIN: 15.2 G/DL (ref 11.9–15.1)
IMMATURE GRANULOCYTES: 1 %
LDL CHOLESTEROL: 67 MG/DL (ref 0–130)
LYMPHOCYTES # BLD: 29 % (ref 24–43)
MCH RBC QN AUTO: 30.7 PG (ref 25.2–33.5)
MCHC RBC AUTO-ENTMCNC: 33.2 G/DL (ref 28.4–34.8)
MCV RBC AUTO: 92.5 FL (ref 82.6–102.9)
MONOCYTES # BLD: 6 % (ref 3–12)
NRBC AUTOMATED: 0 PER 100 WBC
PDW BLD-RTO: 12.5 % (ref 11.8–14.4)
PLATELET # BLD: 462 K/UL (ref 138–453)
PLATELET ESTIMATE: ABNORMAL
PMV BLD AUTO: 10.1 FL (ref 8.1–13.5)
POTASSIUM SERPL-SCNC: 3.7 MMOL/L (ref 3.7–5.3)
RBC # BLD: 4.95 M/UL (ref 3.95–5.11)
RBC # BLD: ABNORMAL 10*6/UL
SEG NEUTROPHILS: 62 % (ref 36–65)
SEGMENTED NEUTROPHILS ABSOLUTE COUNT: 9.75 K/UL (ref 1.5–8.1)
SODIUM BLD-SCNC: 138 MMOL/L (ref 135–144)
TOTAL PROTEIN: 7.1 G/DL (ref 6.4–8.3)
TRIGL SERPL-MCNC: 159 MG/DL
TSH SERPL DL<=0.05 MIU/L-ACNC: 0.75 MIU/L (ref 0.3–5)
VLDLC SERPL CALC-MCNC: ABNORMAL MG/DL (ref 1–30)
WBC # BLD: 15.5 K/UL (ref 3.5–11.3)
WBC # BLD: ABNORMAL 10*3/UL

## 2022-02-18 ENCOUNTER — HOSPITAL ENCOUNTER (INPATIENT)
Age: 42
LOS: 7 days | Discharge: HOME HEALTH | DRG: 139 | End: 2022-02-25
Attending: EMERGENCY MEDICINE | Admitting: HOSPITALIST
Payer: COMMERCIAL

## 2022-02-18 ENCOUNTER — APPOINTMENT (OUTPATIENT)
Dept: GENERAL RADIOLOGY | Age: 42
DRG: 139 | End: 2022-02-18
Payer: COMMERCIAL

## 2022-02-18 ENCOUNTER — APPOINTMENT (OUTPATIENT)
Dept: CT IMAGING | Age: 42
DRG: 139 | End: 2022-02-18
Payer: COMMERCIAL

## 2022-02-18 DIAGNOSIS — J44.9 OSA AND COPD OVERLAP SYNDROME (HCC): ICD-10-CM

## 2022-02-18 DIAGNOSIS — Z87.891 PERSONAL HISTORY OF TOBACCO USE: ICD-10-CM

## 2022-02-18 DIAGNOSIS — E66.01 MORBID OBESITY DUE TO EXCESS CALORIES (HCC): ICD-10-CM

## 2022-02-18 DIAGNOSIS — J18.9 PNEUMONIA DUE TO INFECTIOUS ORGANISM, UNSPECIFIED LATERALITY, UNSPECIFIED PART OF LUNG: ICD-10-CM

## 2022-02-18 DIAGNOSIS — J96.01 ACUTE RESPIRATORY FAILURE WITH HYPOXIA (HCC): Primary | ICD-10-CM

## 2022-02-18 DIAGNOSIS — R59.0 LAD (LYMPHADENOPATHY), MEDIASTINAL: ICD-10-CM

## 2022-02-18 DIAGNOSIS — G47.33 OSA AND COPD OVERLAP SYNDROME (HCC): ICD-10-CM

## 2022-02-18 LAB
ALBUMIN SERPL-MCNC: 3.1 G/DL (ref 3.5–5.1)
ALP BLD-CCNC: 160 U/L (ref 38–126)
ALT SERPL-CCNC: 42 U/L (ref 11–66)
ANION GAP SERPL CALCULATED.3IONS-SCNC: 11 MEQ/L (ref 8–16)
APTT: 28.7 SECONDS (ref 22–38)
AST SERPL-CCNC: 33 U/L (ref 5–40)
BASOPHILS # BLD: 0.6 %
BASOPHILS ABSOLUTE: 0.1 THOU/MM3 (ref 0–0.1)
BILIRUB SERPL-MCNC: 0.2 MG/DL (ref 0.3–1.2)
BILIRUBIN DIRECT: < 0.2 MG/DL (ref 0–0.3)
BUN BLDV-MCNC: 23 MG/DL (ref 7–22)
C-REACTIVE PROTEIN: 3.77 MG/DL (ref 0–1)
CALCIUM SERPL-MCNC: 9 MG/DL (ref 8.5–10.5)
CHLORIDE BLD-SCNC: 95 MEQ/L (ref 98–111)
CO2: 29 MEQ/L (ref 23–33)
CREAT SERPL-MCNC: 1 MG/DL (ref 0.4–1.2)
EOSINOPHIL # BLD: 0.8 %
EOSINOPHILS ABSOLUTE: 0.1 THOU/MM3 (ref 0–0.4)
ERYTHROCYTE [DISTWIDTH] IN BLOOD BY AUTOMATED COUNT: 14.3 % (ref 11.5–14.5)
ERYTHROCYTE [DISTWIDTH] IN BLOOD BY AUTOMATED COUNT: 49.6 FL (ref 35–45)
FLU A ANTIGEN: NEGATIVE
FLU B ANTIGEN: NEGATIVE
GFR SERPL CREATININE-BSD FRML MDRD: 61 ML/MIN/1.73M2
GLUCOSE BLD-MCNC: 247 MG/DL (ref 70–108)
GLUCOSE BLD-MCNC: 268 MG/DL (ref 70–108)
HCT VFR BLD CALC: 55.4 % (ref 37–47)
HEMOGLOBIN: 17.8 GM/DL (ref 12–16)
IMMATURE GRANS (ABS): 0.12 THOU/MM3 (ref 0–0.07)
IMMATURE GRANULOCYTES: 0.7 %
INR BLD: 0.95 (ref 0.85–1.13)
LACTIC ACID, SEPSIS: 1.7 MMOL/L (ref 0.5–1.9)
LACTIC ACID, SEPSIS: 2 MMOL/L (ref 0.5–1.9)
LD: 327 U/L (ref 100–190)
LYMPHOCYTES # BLD: 21.7 %
LYMPHOCYTES ABSOLUTE: 3.8 THOU/MM3 (ref 1–4.8)
MCH RBC QN AUTO: 30.6 PG (ref 26–33)
MCHC RBC AUTO-ENTMCNC: 32.1 GM/DL (ref 32.2–35.5)
MCV RBC AUTO: 95.4 FL (ref 81–99)
MONOCYTES # BLD: 9 %
MONOCYTES ABSOLUTE: 1.6 THOU/MM3 (ref 0.4–1.3)
NUCLEATED RED BLOOD CELLS: 0 /100 WBC
PLATELET # BLD: 284 THOU/MM3 (ref 130–400)
PMV BLD AUTO: 10.2 FL (ref 9.4–12.4)
POTASSIUM REFLEX MAGNESIUM: 4.2 MEQ/L (ref 3.5–5.2)
PRO-BNP: 2561 PG/ML (ref 0–450)
PROCALCITONIN: 0.12 NG/ML (ref 0.01–0.09)
RBC # BLD: 5.81 MILL/MM3 (ref 4.2–5.4)
REASON FOR REJECTION: NORMAL
REJECTED TEST: NORMAL
SARS-COV-2, NAAT: NOT  DETECTED
SEG NEUTROPHILS: 67.2 %
SEGMENTED NEUTROPHILS ABSOLUTE COUNT: 11.9 THOU/MM3 (ref 1.8–7.7)
SODIUM BLD-SCNC: 135 MEQ/L (ref 135–145)
TOTAL PROTEIN: 7 G/DL (ref 6.1–8)
TROPONIN T: < 0.01 NG/ML
WBC # BLD: 17.7 THOU/MM3 (ref 4.8–10.8)

## 2022-02-18 PROCEDURE — 87040 BLOOD CULTURE FOR BACTERIA: CPT

## 2022-02-18 PROCEDURE — 86140 C-REACTIVE PROTEIN: CPT

## 2022-02-18 PROCEDURE — 6360000004 HC RX CONTRAST MEDICATION: Performed by: EMERGENCY MEDICINE

## 2022-02-18 PROCEDURE — 83880 ASSAY OF NATRIURETIC PEPTIDE: CPT

## 2022-02-18 PROCEDURE — 96365 THER/PROPH/DIAG IV INF INIT: CPT

## 2022-02-18 PROCEDURE — 71045 X-RAY EXAM CHEST 1 VIEW: CPT

## 2022-02-18 PROCEDURE — 83605 ASSAY OF LACTIC ACID: CPT

## 2022-02-18 PROCEDURE — 85025 COMPLETE CBC W/AUTO DIFF WBC: CPT

## 2022-02-18 PROCEDURE — 84484 ASSAY OF TROPONIN QUANT: CPT

## 2022-02-18 PROCEDURE — 87635 SARS-COV-2 COVID-19 AMP PRB: CPT

## 2022-02-18 PROCEDURE — 84145 PROCALCITONIN (PCT): CPT

## 2022-02-18 PROCEDURE — 87804 INFLUENZA ASSAY W/OPTIC: CPT

## 2022-02-18 PROCEDURE — 85610 PROTHROMBIN TIME: CPT

## 2022-02-18 PROCEDURE — 80076 HEPATIC FUNCTION PANEL: CPT

## 2022-02-18 PROCEDURE — 6370000000 HC RX 637 (ALT 250 FOR IP): Performed by: STUDENT IN AN ORGANIZED HEALTH CARE EDUCATION/TRAINING PROGRAM

## 2022-02-18 PROCEDURE — 83615 LACTATE (LD) (LDH) ENZYME: CPT

## 2022-02-18 PROCEDURE — 99284 EMERGENCY DEPT VISIT MOD MDM: CPT

## 2022-02-18 PROCEDURE — 93005 ELECTROCARDIOGRAM TRACING: CPT | Performed by: STUDENT IN AN ORGANIZED HEALTH CARE EDUCATION/TRAINING PROGRAM

## 2022-02-18 PROCEDURE — 85730 THROMBOPLASTIN TIME PARTIAL: CPT

## 2022-02-18 PROCEDURE — 94640 AIRWAY INHALATION TREATMENT: CPT

## 2022-02-18 PROCEDURE — 71275 CT ANGIOGRAPHY CHEST: CPT

## 2022-02-18 PROCEDURE — 96361 HYDRATE IV INFUSION ADD-ON: CPT

## 2022-02-18 PROCEDURE — 36415 COLL VENOUS BLD VENIPUNCTURE: CPT

## 2022-02-18 PROCEDURE — 82948 REAGENT STRIP/BLOOD GLUCOSE: CPT

## 2022-02-18 PROCEDURE — 84443 ASSAY THYROID STIM HORMONE: CPT

## 2022-02-18 PROCEDURE — 94760 N-INVAS EAR/PLS OXIMETRY 1: CPT

## 2022-02-18 PROCEDURE — 2580000003 HC RX 258: Performed by: STUDENT IN AN ORGANIZED HEALTH CARE EDUCATION/TRAINING PROGRAM

## 2022-02-18 PROCEDURE — 1200000003 HC TELEMETRY R&B

## 2022-02-18 PROCEDURE — 6360000002 HC RX W HCPCS: Performed by: STUDENT IN AN ORGANIZED HEALTH CARE EDUCATION/TRAINING PROGRAM

## 2022-02-18 PROCEDURE — 2700000000 HC OXYGEN THERAPY PER DAY

## 2022-02-18 PROCEDURE — 99223 1ST HOSP IP/OBS HIGH 75: CPT | Performed by: HOSPITALIST

## 2022-02-18 PROCEDURE — 80048 BASIC METABOLIC PNL TOTAL CA: CPT

## 2022-02-18 RX ORDER — AZITHROMYCIN 250 MG/1
500 TABLET, FILM COATED ORAL ONCE
Status: COMPLETED | OUTPATIENT
Start: 2022-02-18 | End: 2022-02-18

## 2022-02-18 RX ORDER — SODIUM CHLORIDE, SODIUM LACTATE, POTASSIUM CHLORIDE, AND CALCIUM CHLORIDE .6; .31; .03; .02 G/100ML; G/100ML; G/100ML; G/100ML
1000 INJECTION, SOLUTION INTRAVENOUS ONCE
Status: COMPLETED | OUTPATIENT
Start: 2022-02-18 | End: 2022-02-18

## 2022-02-18 RX ORDER — IPRATROPIUM BROMIDE AND ALBUTEROL SULFATE 2.5; .5 MG/3ML; MG/3ML
2 SOLUTION RESPIRATORY (INHALATION) ONCE
Status: COMPLETED | OUTPATIENT
Start: 2022-02-18 | End: 2022-02-18

## 2022-02-18 RX ORDER — NICOTINE 21 MG/24HR
1 PATCH, TRANSDERMAL 24 HOURS TRANSDERMAL DAILY
Status: DISCONTINUED | OUTPATIENT
Start: 2022-02-19 | End: 2022-02-25 | Stop reason: HOSPADM

## 2022-02-18 RX ADMIN — SODIUM CHLORIDE, POTASSIUM CHLORIDE, SODIUM LACTATE AND CALCIUM CHLORIDE 1000 ML: 600; 310; 30; 20 INJECTION, SOLUTION INTRAVENOUS at 20:56

## 2022-02-18 RX ADMIN — IOPAMIDOL 80 ML: 755 INJECTION, SOLUTION INTRAVENOUS at 22:23

## 2022-02-18 RX ADMIN — IPRATROPIUM BROMIDE AND ALBUTEROL SULFATE 2 AMPULE: .5; 3 SOLUTION RESPIRATORY (INHALATION) at 20:50

## 2022-02-18 RX ADMIN — AZITHROMYCIN MONOHYDRATE 500 MG: 250 TABLET ORAL at 22:13

## 2022-02-18 RX ADMIN — CEFTRIAXONE SODIUM 2000 MG: 2 INJECTION, POWDER, FOR SOLUTION INTRAMUSCULAR; INTRAVENOUS at 23:09

## 2022-02-19 LAB
ALLEN TEST: POSITIVE
ANION GAP SERPL CALCULATED.3IONS-SCNC: 10 MEQ/L (ref 8–16)
AVERAGE GLUCOSE: 135 MG/DL (ref 70–126)
BASE EXCESS (CALCULATED): 3.7 MMOL/L (ref -2.5–2.5)
BUN BLDV-MCNC: 20 MG/DL (ref 7–22)
CALCIUM SERPL-MCNC: 9 MG/DL (ref 8.5–10.5)
CHLORIDE BLD-SCNC: 96 MEQ/L (ref 98–111)
CO2: 29 MEQ/L (ref 23–33)
COLLECTED BY:: ABNORMAL
CREAT SERPL-MCNC: 0.7 MG/DL (ref 0.4–1.2)
DEVICE: ABNORMAL
EKG ATRIAL RATE: 113 BPM
EKG P AXIS: 76 DEGREES
EKG P-R INTERVAL: 130 MS
EKG Q-T INTERVAL: 318 MS
EKG QRS DURATION: 80 MS
EKG QTC CALCULATION (BAZETT): 436 MS
EKG R AXIS: 57 DEGREES
EKG T AXIS: 65 DEGREES
EKG VENTRICULAR RATE: 113 BPM
ERYTHROCYTE [DISTWIDTH] IN BLOOD BY AUTOMATED COUNT: 14.6 % (ref 11.5–14.5)
ERYTHROCYTE [DISTWIDTH] IN BLOOD BY AUTOMATED COUNT: 50.5 FL (ref 35–45)
GFR SERPL CREATININE-BSD FRML MDRD: > 90 ML/MIN/1.73M2
GLUCOSE BLD-MCNC: 249 MG/DL (ref 70–108)
GLUCOSE BLD-MCNC: 281 MG/DL (ref 70–108)
GLUCOSE BLD-MCNC: 315 MG/DL (ref 70–108)
GLUCOSE BLD-MCNC: 323 MG/DL (ref 70–108)
GLUCOSE BLD-MCNC: 327 MG/DL (ref 70–108)
GLUCOSE BLD-MCNC: 407 MG/DL (ref 70–108)
HBA1C MFR BLD: 6.5 % (ref 4.4–6.4)
HCO3: 31 MMOL/L (ref 23–28)
HCT VFR BLD CALC: 58.1 % (ref 37–47)
HEMOGLOBIN: 18.4 GM/DL (ref 12–16)
IFIO2: 5
LACTIC ACID: 2.1 MMOL/L (ref 0.5–2)
LACTIC ACID: 2.3 MMOL/L (ref 0.5–2)
LACTIC ACID: 3.4 MMOL/L (ref 0.5–2)
LV EF: 58 %
LVEF MODALITY: NORMAL
MCH RBC QN AUTO: 30.4 PG (ref 26–33)
MCHC RBC AUTO-ENTMCNC: 31.7 GM/DL (ref 32.2–35.5)
MCV RBC AUTO: 96 FL (ref 81–99)
O2 SATURATION: 86 %
PCO2: 54 MMHG (ref 35–45)
PH BLOOD GAS: 7.37 (ref 7.35–7.45)
PLATELET # BLD: 273 THOU/MM3 (ref 130–400)
PMV BLD AUTO: 10.2 FL (ref 9.4–12.4)
PO2: 54 MMHG (ref 71–104)
POTASSIUM REFLEX MAGNESIUM: 5 MEQ/L (ref 3.5–5.2)
RBC # BLD: 6.05 MILL/MM3 (ref 4.2–5.4)
REVIEWED BY: NORMAL
SMEAR REVIEW: NORMAL
SODIUM BLD-SCNC: 135 MEQ/L (ref 135–145)
SOURCE, BLOOD GAS: ABNORMAL
TSH SERPL DL<=0.05 MIU/L-ACNC: 2.06 UIU/ML (ref 0.4–4.2)
WBC # BLD: 18.7 THOU/MM3 (ref 4.8–10.8)

## 2022-02-19 PROCEDURE — 1200000003 HC TELEMETRY R&B

## 2022-02-19 PROCEDURE — 93306 TTE W/DOPPLER COMPLETE: CPT

## 2022-02-19 PROCEDURE — 2580000003 HC RX 258: Performed by: INTERNAL MEDICINE

## 2022-02-19 PROCEDURE — 83605 ASSAY OF LACTIC ACID: CPT

## 2022-02-19 PROCEDURE — 83036 HEMOGLOBIN GLYCOSYLATED A1C: CPT

## 2022-02-19 PROCEDURE — 36600 WITHDRAWAL OF ARTERIAL BLOOD: CPT

## 2022-02-19 PROCEDURE — 6360000002 HC RX W HCPCS

## 2022-02-19 PROCEDURE — 1200000000 HC SEMI PRIVATE

## 2022-02-19 PROCEDURE — 2580000003 HC RX 258

## 2022-02-19 PROCEDURE — 36415 COLL VENOUS BLD VENIPUNCTURE: CPT

## 2022-02-19 PROCEDURE — 94669 MECHANICAL CHEST WALL OSCILL: CPT

## 2022-02-19 PROCEDURE — 82803 BLOOD GASES ANY COMBINATION: CPT

## 2022-02-19 PROCEDURE — 85027 COMPLETE CBC AUTOMATED: CPT

## 2022-02-19 PROCEDURE — 94760 N-INVAS EAR/PLS OXIMETRY 1: CPT

## 2022-02-19 PROCEDURE — 6370000000 HC RX 637 (ALT 250 FOR IP)

## 2022-02-19 PROCEDURE — 82948 REAGENT STRIP/BLOOD GLUCOSE: CPT

## 2022-02-19 PROCEDURE — 6360000002 HC RX W HCPCS: Performed by: INTERNAL MEDICINE

## 2022-02-19 PROCEDURE — 99233 SBSQ HOSP IP/OBS HIGH 50: CPT | Performed by: INTERNAL MEDICINE

## 2022-02-19 PROCEDURE — 2580000003 HC RX 258: Performed by: HOSPITALIST

## 2022-02-19 PROCEDURE — 80048 BASIC METABOLIC PNL TOTAL CA: CPT

## 2022-02-19 PROCEDURE — 6370000000 HC RX 637 (ALT 250 FOR IP): Performed by: INTERNAL MEDICINE

## 2022-02-19 PROCEDURE — 94640 AIRWAY INHALATION TREATMENT: CPT

## 2022-02-19 RX ORDER — DEXTROSE MONOHYDRATE 50 MG/ML
100 INJECTION, SOLUTION INTRAVENOUS PRN
Status: DISCONTINUED | OUTPATIENT
Start: 2022-02-19 | End: 2022-02-25 | Stop reason: HOSPADM

## 2022-02-19 RX ORDER — ACETAMINOPHEN 650 MG/1
650 SUPPOSITORY RECTAL EVERY 6 HOURS PRN
Status: DISCONTINUED | OUTPATIENT
Start: 2022-02-19 | End: 2022-02-25 | Stop reason: HOSPADM

## 2022-02-19 RX ORDER — ONDANSETRON 4 MG/1
4 TABLET, ORALLY DISINTEGRATING ORAL EVERY 8 HOURS PRN
Status: DISCONTINUED | OUTPATIENT
Start: 2022-02-19 | End: 2022-02-25 | Stop reason: HOSPADM

## 2022-02-19 RX ORDER — OLANZAPINE 10 MG/1
10 TABLET ORAL NIGHTLY
Status: DISCONTINUED | OUTPATIENT
Start: 2022-02-19 | End: 2022-02-25 | Stop reason: HOSPADM

## 2022-02-19 RX ORDER — ASPIRIN 81 MG/1
81 TABLET, CHEWABLE ORAL DAILY
Status: DISCONTINUED | OUTPATIENT
Start: 2022-02-19 | End: 2022-02-25 | Stop reason: HOSPADM

## 2022-02-19 RX ORDER — METOPROLOL TARTRATE 100 MG/1
100 TABLET ORAL 2 TIMES DAILY
Status: DISCONTINUED | OUTPATIENT
Start: 2022-02-19 | End: 2022-02-19

## 2022-02-19 RX ORDER — SODIUM CHLORIDE 0.9 % (FLUSH) 0.9 %
10 SYRINGE (ML) INJECTION PRN
Status: DISCONTINUED | OUTPATIENT
Start: 2022-02-19 | End: 2022-02-25 | Stop reason: HOSPADM

## 2022-02-19 RX ORDER — POLYETHYLENE GLYCOL 3350 17 G/17G
17 POWDER, FOR SOLUTION ORAL DAILY PRN
Status: DISCONTINUED | OUTPATIENT
Start: 2022-02-19 | End: 2022-02-25 | Stop reason: HOSPADM

## 2022-02-19 RX ORDER — SODIUM CHLORIDE 9 MG/ML
25 INJECTION, SOLUTION INTRAVENOUS PRN
Status: DISCONTINUED | OUTPATIENT
Start: 2022-02-19 | End: 2022-02-25 | Stop reason: HOSPADM

## 2022-02-19 RX ORDER — IPRATROPIUM BROMIDE AND ALBUTEROL SULFATE 2.5; .5 MG/3ML; MG/3ML
1 SOLUTION RESPIRATORY (INHALATION)
Status: DISCONTINUED | OUTPATIENT
Start: 2022-02-19 | End: 2022-02-23

## 2022-02-19 RX ORDER — BUDESONIDE AND FORMOTEROL FUMARATE DIHYDRATE 160; 4.5 UG/1; UG/1
2 AEROSOL RESPIRATORY (INHALATION) 2 TIMES DAILY
Status: DISCONTINUED | OUTPATIENT
Start: 2022-02-19 | End: 2022-02-21

## 2022-02-19 RX ORDER — GABAPENTIN 400 MG/1
800 CAPSULE ORAL 3 TIMES DAILY
Status: DISCONTINUED | OUTPATIENT
Start: 2022-02-19 | End: 2022-02-25 | Stop reason: HOSPADM

## 2022-02-19 RX ORDER — NICOTINE POLACRILEX 4 MG
15 LOZENGE BUCCAL PRN
Status: DISCONTINUED | OUTPATIENT
Start: 2022-02-19 | End: 2022-02-25 | Stop reason: HOSPADM

## 2022-02-19 RX ORDER — PANTOPRAZOLE SODIUM 40 MG/1
40 TABLET, DELAYED RELEASE ORAL
Status: DISCONTINUED | OUTPATIENT
Start: 2022-02-19 | End: 2022-02-25 | Stop reason: HOSPADM

## 2022-02-19 RX ORDER — SODIUM CHLORIDE 9 MG/ML
INJECTION, SOLUTION INTRAVENOUS CONTINUOUS
Status: DISCONTINUED | OUTPATIENT
Start: 2022-02-19 | End: 2022-02-25

## 2022-02-19 RX ORDER — AMITRIPTYLINE HYDROCHLORIDE 10 MG/1
10 TABLET, FILM COATED ORAL NIGHTLY
Status: DISCONTINUED | OUTPATIENT
Start: 2022-02-19 | End: 2022-02-25 | Stop reason: HOSPADM

## 2022-02-19 RX ORDER — DEXTROSE MONOHYDRATE 25 G/50ML
12.5 INJECTION, SOLUTION INTRAVENOUS PRN
Status: DISCONTINUED | OUTPATIENT
Start: 2022-02-19 | End: 2022-02-19

## 2022-02-19 RX ORDER — INSULIN GLARGINE 100 [IU]/ML
60 INJECTION, SOLUTION SUBCUTANEOUS NIGHTLY
Status: DISCONTINUED | OUTPATIENT
Start: 2022-02-19 | End: 2022-02-25 | Stop reason: HOSPADM

## 2022-02-19 RX ORDER — INSULIN GLARGINE 100 [IU]/ML
50 INJECTION, SOLUTION SUBCUTANEOUS NIGHTLY
Status: DISCONTINUED | OUTPATIENT
Start: 2022-02-19 | End: 2022-02-19

## 2022-02-19 RX ORDER — SODIUM CHLORIDE 0.9 % (FLUSH) 0.9 %
10 SYRINGE (ML) INJECTION EVERY 12 HOURS SCHEDULED
Status: DISCONTINUED | OUTPATIENT
Start: 2022-02-19 | End: 2022-02-25 | Stop reason: HOSPADM

## 2022-02-19 RX ORDER — LISINOPRIL 10 MG/1
10 TABLET ORAL DAILY
Status: DISCONTINUED | OUTPATIENT
Start: 2022-02-19 | End: 2022-02-19

## 2022-02-19 RX ORDER — ATORVASTATIN CALCIUM 40 MG/1
40 TABLET, FILM COATED ORAL NIGHTLY
Status: DISCONTINUED | OUTPATIENT
Start: 2022-02-19 | End: 2022-02-25 | Stop reason: HOSPADM

## 2022-02-19 RX ORDER — ACETAMINOPHEN 325 MG/1
650 TABLET ORAL EVERY 6 HOURS PRN
Status: DISCONTINUED | OUTPATIENT
Start: 2022-02-19 | End: 2022-02-25 | Stop reason: HOSPADM

## 2022-02-19 RX ORDER — METOPROLOL TARTRATE 50 MG/1
50 TABLET, FILM COATED ORAL 2 TIMES DAILY
Status: DISCONTINUED | OUTPATIENT
Start: 2022-02-19 | End: 2022-02-25 | Stop reason: HOSPADM

## 2022-02-19 RX ORDER — PREDNISONE 20 MG/1
40 TABLET ORAL DAILY
Status: DISCONTINUED | OUTPATIENT
Start: 2022-02-19 | End: 2022-02-22

## 2022-02-19 RX ORDER — ONDANSETRON 2 MG/ML
4 INJECTION INTRAMUSCULAR; INTRAVENOUS EVERY 6 HOURS PRN
Status: DISCONTINUED | OUTPATIENT
Start: 2022-02-19 | End: 2022-02-25 | Stop reason: HOSPADM

## 2022-02-19 RX ORDER — LISINOPRIL 20 MG/1
20 TABLET ORAL 2 TIMES DAILY
Status: DISCONTINUED | OUTPATIENT
Start: 2022-02-19 | End: 2022-02-25 | Stop reason: HOSPADM

## 2022-02-19 RX ADMIN — AZITHROMYCIN DIHYDRATE 500 MG: 500 INJECTION, POWDER, LYOPHILIZED, FOR SOLUTION INTRAVENOUS at 22:05

## 2022-02-19 RX ADMIN — DILTIAZEM HYDROCHLORIDE 30 MG: 30 TABLET, FILM COATED ORAL at 21:12

## 2022-02-19 RX ADMIN — LISINOPRIL 10 MG: 10 TABLET ORAL at 13:12

## 2022-02-19 RX ADMIN — SODIUM CHLORIDE, PRESERVATIVE FREE 10 ML: 5 INJECTION INTRAVENOUS at 21:48

## 2022-02-19 RX ADMIN — SODIUM CHLORIDE: 9 INJECTION, SOLUTION INTRAVENOUS at 08:07

## 2022-02-19 RX ADMIN — AMITRIPTYLINE HYDROCHLORIDE 10 MG: 10 TABLET, FILM COATED ORAL at 21:12

## 2022-02-19 RX ADMIN — INSULIN GLARGINE 50 UNITS: 100 INJECTION, SOLUTION SUBCUTANEOUS at 02:00

## 2022-02-19 RX ADMIN — OLANZAPINE 10 MG: 10 TABLET, FILM COATED ORAL at 21:12

## 2022-02-19 RX ADMIN — DILTIAZEM HYDROCHLORIDE 30 MG: 30 TABLET, FILM COATED ORAL at 13:13

## 2022-02-19 RX ADMIN — INSULIN LISPRO 3 UNITS: 100 INJECTION, SOLUTION INTRAVENOUS; SUBCUTANEOUS at 04:03

## 2022-02-19 RX ADMIN — BUDESONIDE AND FORMOTEROL FUMARATE DIHYDRATE 2 PUFF: 160; 4.5 AEROSOL RESPIRATORY (INHALATION) at 16:10

## 2022-02-19 RX ADMIN — SODIUM CHLORIDE, PRESERVATIVE FREE 10 ML: 5 INJECTION INTRAVENOUS at 08:03

## 2022-02-19 RX ADMIN — ENOXAPARIN SODIUM 60 MG: 100 INJECTION SUBCUTANEOUS at 23:34

## 2022-02-19 RX ADMIN — GABAPENTIN 800 MG: 400 CAPSULE ORAL at 13:14

## 2022-02-19 RX ADMIN — ASPIRIN 81 MG CHEWABLE TABLET 81 MG: 81 TABLET CHEWABLE at 07:58

## 2022-02-19 RX ADMIN — IPRATROPIUM BROMIDE AND ALBUTEROL SULFATE 1 AMPULE: .5; 3 SOLUTION RESPIRATORY (INHALATION) at 12:19

## 2022-02-19 RX ADMIN — INSULIN GLARGINE 60 UNITS: 100 INJECTION, SOLUTION SUBCUTANEOUS at 17:51

## 2022-02-19 RX ADMIN — LISINOPRIL 20 MG: 20 TABLET ORAL at 17:54

## 2022-02-19 RX ADMIN — ATORVASTATIN CALCIUM 40 MG: 40 TABLET, FILM COATED ORAL at 21:12

## 2022-02-19 RX ADMIN — GABAPENTIN 800 MG: 400 CAPSULE ORAL at 21:13

## 2022-02-19 RX ADMIN — BUDESONIDE AND FORMOTEROL FUMARATE DIHYDRATE 2 PUFF: 160; 4.5 AEROSOL RESPIRATORY (INHALATION) at 08:10

## 2022-02-19 RX ADMIN — PANTOPRAZOLE SODIUM 40 MG: 40 TABLET, DELAYED RELEASE ORAL at 06:25

## 2022-02-19 RX ADMIN — IPRATROPIUM BROMIDE AND ALBUTEROL SULFATE 1 AMPULE: .5; 3 SOLUTION RESPIRATORY (INHALATION) at 16:10

## 2022-02-19 RX ADMIN — PREDNISONE 40 MG: 20 TABLET ORAL at 07:50

## 2022-02-19 RX ADMIN — METOPROLOL TARTRATE 50 MG: 50 TABLET, FILM COATED ORAL at 21:12

## 2022-02-19 RX ADMIN — IPRATROPIUM BROMIDE AND ALBUTEROL SULFATE 1 AMPULE: .5; 3 SOLUTION RESPIRATORY (INHALATION) at 20:36

## 2022-02-19 RX ADMIN — SODIUM CHLORIDE: 9 INJECTION, SOLUTION INTRAVENOUS at 17:53

## 2022-02-19 RX ADMIN — CEFTRIAXONE SODIUM 2000 MG: 2 INJECTION, POWDER, FOR SOLUTION INTRAMUSCULAR; INTRAVENOUS at 21:24

## 2022-02-19 RX ADMIN — GABAPENTIN 800 MG: 400 CAPSULE ORAL at 07:50

## 2022-02-19 RX ADMIN — IPRATROPIUM BROMIDE AND ALBUTEROL SULFATE 1 AMPULE: .5; 3 SOLUTION RESPIRATORY (INHALATION) at 08:11

## 2022-02-19 RX ADMIN — ENOXAPARIN SODIUM 60 MG: 100 INJECTION SUBCUTANEOUS at 11:54

## 2022-02-19 RX ADMIN — METOPROLOL TARTRATE 100 MG: 100 TABLET, FILM COATED ORAL at 13:13

## 2022-02-19 ASSESSMENT — ENCOUNTER SYMPTOMS
BACK PAIN: 0
COLOR CHANGE: 0
BLOOD IN STOOL: 0
SORE THROAT: 0
COUGH: 0
ABDOMINAL PAIN: 0
RHINORRHEA: 0
NAUSEA: 0
CHEST TIGHTNESS: 1
ABDOMINAL DISTENTION: 0
COUGH: 1
VOMITING: 0
DIARRHEA: 0
SHORTNESS OF BREATH: 1
WHEEZING: 1
CONSTIPATION: 0

## 2022-02-19 NOTE — PROGRESS NOTES
Hospitalist Progress Note    Patient:  Al Rodriguez      Unit/Bed:7K-27/027-A    YOB: 1980    MRN: [id number]       Acct: [de-identified]     PCP: ROBBIE Oneil CNP    Date of Admission: 2/18/2022    Active Hospital Problems    Diagnosis Date Noted    Acute respiratory failure with hypoxia [facility]) [J96.01] 02/18/2022       Assessment/Plan:    Sepsis  Acute respiratory failure due to CAP  DM-2  Obesity  Essential HTN  Sleep disturbances  Tobacco dependence      Continue with IV antibiotics, I increased the Rocephin to 2 mg daily ,   Continue with IV zithro, and follow up blood cultures. She was encouraged to be careful with her diet, as her BMI is markedly elevated. Pulmonary consulted     DM-2 not optimally controlled, on prednisone, increase lantus and SS to group -2 and check A1c with next blood draw. HTN - increase BP meds, added BB and increase ACE, titrate as needed    She likely has JACIEL - based on son's input, she stops breathing several times, and snores heavily, with high BMI. So she was educated on this, and outpatient sleep studies. Smoking cessation recommended. Expected discharge date:   in couple of day s         Disposition:      [x] Home                             [] TCU                             [] Rehab                             [] Psych                             [] SNF                             [] [place]                             [] Other-    Chief Complaint: [facility] Course: Patient was seen, examined and the medical chart was reviewed thoroughly today. In summary, 37 y. o.female admitted on 2/18/2022 for sepsis, and PNA  Along with marked obesity, and tobacco use. She is on broad spectrum IV antibiotics, and IVF, will recheck lactate, mildly elevated   Noted the Echo normal EF, not in CHF at this time.        Subjective (past 24 hours):  admitted early am hrs, breathing better eating food, no CP or LOC, mild SOB is better. Medications:  Reviewed    Infusion Medications    sodium chloride      dextrose      sodium chloride 125 mL/hr at 02/19/22 1709     Scheduled Medications    amitriptyline  10 mg Oral Nightly    aspirin  81 mg Oral Daily    atorvastatin  40 mg Oral Nightly    dilTIAZem  30 mg Oral TID    gabapentin  800 mg Oral TID    OLANZapine  10 mg Oral Nightly    budesonide-formoterol  2 puff Inhalation BID    ipratropium-albuterol  1 ampule Inhalation Q4H WA    azithromycin  500 mg IntraVENous Q24H    sodium chloride flush  10 mL IntraVENous 2 times per day    pantoprazole  40 mg Oral QAM AC    predniSONE  40 mg Oral Daily    enoxaparin  60 mg SubCUTAneous Q12H    insulin glargine  60 Units SubCUTAneous Nightly    insulin lispro  0-18 Units SubCUTAneous TID WC    insulin lispro  0-9 Units SubCUTAneous Nightly    cefTRIAXone (ROCEPHIN) IV  2,000 mg IntraVENous Q24H    lisinopril  20 mg Oral BID    metoprolol  50 mg Oral BID    nicotine  1 patch TransDERmal Daily     PRN Meds: sodium chloride flush, sodium chloride, ondansetron **OR** ondansetron, polyethylene glycol, acetaminophen **OR** acetaminophen, glucose, glucagon (rDNA), dextrose, dextrose bolus (hypoglycemia) **OR** dextrose bolus (hypoglycemia)      Intake/Output Summary (Last 24 hours) at 2/19/2022 1711  Last data filed at 2/19/2022 1237  Gross per 24 hour   Intake 360 ml   Output --   Net 360 ml       Diet:  ADULT DIET; Regular; 5 carb choices (75 gm/meal)    Exam:  BP (!) 140/81   Pulse 86   Temp 97.5 °F (36.4 °C) (Oral)   Resp 20   Ht 5' (1.524 m)   Wt 271 lb (122.9 kg)   SpO2 92%   BMI 52.93 kg/m²     General appearance: A&O x3, Not ill or toxic, in no apparent distress  HEENT:  JUNI  EOM intact. Neck: Supple, with full range of motion. No jugular venous distention. Trachea midline.   Respiratory:   NL A/E bilat with no adventitious sounds   Cardiovascular:  normal S1/S2 with no murmurs/gallops  Abdomen: Soft, non-tender, non-distended, no rigidity or peritoneal signs  Musculoskeletal: NL symmetrical A/PROM bilat U/L extremities   Skin: No rashes. No edema  Neurologic:  CN II-XII intact. NL symmetrical reflexes. NL gait and stance. NL Cerebellar exam. Power 5/5 all muscle groups U/L extremities. Toes downgoing  Capillary Refill: Brisk,< 3 seconds   Peripheral Pulses: +2 palpable, equal bilaterally        Labs:   Recent Labs     02/18/22 2025 02/19/22  0414   WBC 17.7* 18.7*   HGB 17.8* 18.4*   HCT 55.4* 58.1*    273     Recent Labs     02/18/22 2025 02/19/22  0414    135   K 4.2 5.0   CL 95* 96*   CO2 29 29   BUN 23* 20   CREATININE 1.0 0.7   CALCIUM 9.0 9.0     Recent Labs     02/18/22 2025   AST 33   ALT 42   BILIDIR <0.2   BILITOT 0.2*   ALKPHOS 160*     Recent Labs     02/18/22 2025   INR 0.95     No results for input(s): Xu Killings in the last 72 hours. Urinalysis:      Lab Results   Component Value Date    NITRU NEGATIVE 08/08/2018    WBCUA 10-15 08/08/2018    WBCUA 5-10 05/31/2012    BACTERIA FEW 08/08/2018    RBCUA 0-2 08/08/2018    BLOODU TRACE 08/08/2018    SPECGRAV >=1.030 01/04/2013    GLUCOSEU >= 1000 08/08/2018       Radiology:    ECHO Complete 2D W Doppler W Color    Result Date: 2/19/2022  Transthoracic Echocardiography Report (TTE)  Demographics   Patient Name  Para Rom  Gender             Female                S   MR #          [id number]    Race                                             Ethnicity   Account #     [de-identified]    Room Number        2233   Accession     [id number]   Date of Study      02/19/2022  Number   Date of Birth 1980   Referring          Elyse Mason                             Physician          Altagracia Hicks MD   Age           37 year(s)   Sonographer        ALVINA Sarkar, RDCS,                                                RDMS, RVT                              Interpreting       Leila Colorado MD Physician  Procedure Type of Study   TTE procedure:ECHOCARDIOGRAM COMPLETE 2D W DOPPLER W COLOR. Procedure Date Date: 02/19/2022 Start: 09:31 AM Study Location: Bedside Technical Quality: Limited visualization due to body habitus. Indications:Acute respiratory failure. Additional Medical History:acute respiratory failure, abdominal pain, tachycardia, sepsis, diabetes, hypertension, morbid obesity, hyperlipidemia Patient Status: Routine Height: 60 inches Weight: 270.01 pounds BSA: 2.12 m^2 BMI: 52.73 kg/m^2 BP: 173/84 mmHg  Conclusions   Summary  Technically difficult examination. Left ventricular size and systolic function is normal. Ejection fraction  was estimated at 55-60%. LV wall thickness is within normal limits and  there are no obvious wall motion abnormalities. The right ventricular size appears normal with normal systolic function  and wall thickness. Signature   ----------------------------------------------------------------  Electronically signed by Moni Murillo MD (Interpreting  physician) on 02/19/2022 at 01:11 PM  ----------------------------------------------------------------   Findings   Mitral Valve  The mitral valve structure is normal with normal leaflet separation. DOPPLER: The transmitral velocity was within the normal range with no  evidence for mitral stenosis. There was no evidence of mitral  regurgitation. Aortic Valve  The aortic valve appears trileaflet with normal thickness and leaflet  excursion. DOPPLER: Transaortic velocity was within the normal range with  no evidence of aortic stenosis. There was no evidence of aortic  regurgitation. Tricuspid Valve  The tricuspid valve structure is normal with normal leaflet separation. DOPPLER: There is no evidence of tricuspid stenosis. There was no evidence  of tricuspid regurgitation. Pulmonic Valve  The pulmonic valve was not well visualized .    Left Atrium  Left atrial size is normal.   Left Ventricle  Left ventricular size and systolic function is normal. Ejection fraction  was estimated at 55-60%. LV wall thickness is within normal limits and  there are no obvious wall motion abnormalities. Right Atrium  Right atrial size was normal.   Right Ventricle  The right ventricular size appears normal with normal systolic function  and wall thickness. Pericardial Effusion  The pericardium appears normal with no evidence of a pericardial effusion. Pleural Effusion  No evidence of pleural effusion. Aorta / Great Vessels  -Aortic root dimension within normal limits. -IVC size is within normal limits with normal respiratory phasic changes.   M-Mode/2D Measurements & Calculations   LV Diastolic      LV Systolic Dimension: 2.7 cm    LA Dimension: 3.9 cmLA  Dimension: 3.7 cm LV Volume Diastolic: 45.6 ml     Area: 12.1 cm^2  LV FS:27 %        LV Volume Systolic: 27 ml  LV PW Diastolic:  LV EDV/LV EDV Index: 58.1 ml/27  1.3 cm            m^2LV ESV/LV ESV Index: 27 CV/52  Septum Diastolic: m^2                              RV Diastolic Dimension:  1.3 cm            EF Calculated: 53.5 %            1.8 cm                                                      Ascending Aorta: 2.4 cm                                                     LA volume/Index: 26.8                                                     ml /13m^2  Doppler Measurements & Calculations   MV Peak E-Wave: 121 cm/s   AV Peak Velocity: 174  LVOT Peak Velocity: 120  MV Peak A-Wave: 136 cm/s   cm/s                   cm/s  MV E/A Ratio: 0.89         AV Peak Gradient:      LVOT Peak Gradient: 6  MV Peak Gradient: 5.86     12.11 mmHg             mmHg  mmHg                                                    TV Peak E-Wave: 53.6  MV Deceleration Time: 264                         cm/s  msec                             IVRT: 95 msec          TV Peak Gradient: 1.15                                                    mmHg  MV E' Septal Velocity: 7.7                        TR Velocity:244 cm/s cm/s                       AV DVI (Vmax):0.69     TR Gradient:23.81 mmHg  MV A' Septal Velocity: 9.6                        PV Peak Velocity: 88.7  cm/s                                              cm/s  MV E' Lateral Velocity:                           PV Peak Gradient: 3.15  9.4 cm/s                                          mmHg  MV A' Lateral Velocity:  10.2 cm/s  E/E' septal: 15.71  E/E' lateral: 12.87  [email or web address] [id number]    CTA CHEST W WO CONTRAST    Result Date: 2/18/2022  CTA chest Comparison: None. Findings: Uniform density through the thyroid gland. Lymphoid hyperplasia in the superior mediastinum, hilar regions and infracarinal tissues. Adenopathy may be reactive to scattered atelectasis and infiltrate through the lungs bilaterally. There is groundglass opacification centrally in the upper lobes with predominantly atelectasis and consolidative infiltrate scattered through the lower lobes and right middle lobe. Minimal airspace opacification is noted to the lingula. There is no evidence of pulmonary embolism or aortic dissection. Small hernia is evident with moderate volume of air seen into the distal esophagus suggesting underlying gastroesophageal reflux disease, correlate clinically. The included portions of the upper abdomen are notable for hepatomegaly. Axillary lymph nodes are benign appearing. The patient's breast tissues are that of scattered fibroglandular nature. For healthcare maintenance purposes she should be reminded that the [employer] recommends screening mammography commencing at the age of 37. Impression: Bilateral pneumonia with significant reactive adenopathy. Follow-up recommended to ensure complete clearing of via chest radiography and subsequent CT examination across the next 6-8 weeks.  This document has been electronically signed by: Lourdes Mendez Brittnee MERAZ MD on 02/18/2022 10:52 PM All CTs at this facility use dose modulation techniques and iterative reconstructions, and/or weight-based dosing when appropriate to reduce radiation to a low as reasonably achievable. XR CHEST PORTABLE    Result Date: 2/18/2022  PROCEDURE: XR CHEST PORTABLE CLINICAL INFORMATION: SOB. COMPARISON: Chest x-ray dated 23 May 2019. Pablito Hardwick TECHNIQUE: AP upright view of the chest. FINDINGS: There is mild cardiomegaly. .The mediastinum is not widened. There is slightly increased density throughout both lung fields. There are no effusions. . The pulmonary vascularity is increased. No suspicious osseous lesions are present. 1. Cardiomegaly and possible mild congestive heart failure. 2.. Slightly increased density throughout both lung fields. **This report has been created using voice recognition software. It may contain minor errors which are inherent in voice recognition technology. ** Final report electronically signed by DR Fannie Aleman on 2/18/2022 9:01 PM      Diet:  Change to ADA , AHA diet    DVT prophylaxis: [x] Lovenox                                 [] SCDs                                 [] SQ Heparin                                 [] Encourage ambulation           [] Already on Anticoagulation       Code Status: Full Code      Active Hospital Problems    Diagnosis Date Noted    Acute respiratory failure with hypoxia ([facility] [address] [J96.01] 02/18/2022        Long d/w pt and son along with RN today         Electronically signed by Lesa Oliver MD on 2/19/2022 at 5:11 PM

## 2022-02-19 NOTE — ED PROVIDER NOTES
ATTENDING NOTE:    I supervised and discussed the history, physical exam and the management of this patient with the resident. I reviewed the resident's note and agree with the documented findings and plan of care. Please see my additional note. Patient presents to the emergency department stating she started to feel unwell 4 days ago. She has had worsening dyspnea and has also had wheezing for the last 3 days. No chest pain, fever, or cough. She is a smoker. She was found to have sats of 68% on room air. She is not typically on home O2. On exam, patient does have wheezing, left greater than right, is receiving a neb at the time of my initial examination. Labs, EKG, and x-ray reviewed. Patient will be admitted, started on antibiotics, cta chest pending at time of sign out. O2 sats improved here in ED. I personally saw and examined the patient. I have reviewed and agree with the resident's findings, including all diagnostic interpretations and treatment plans as written. I was present for the key portion of any procedures performed and the inclusive time noted in any critical care statement.     Electronically verified by Nadia Dixon MD  02/18/22 2053

## 2022-02-19 NOTE — ED PROVIDER NOTES
[facility] ENCOUNTER          Pt Name: Laura Coker  MRN: [id number]  [place] 1980  Date of Evaluation: 2/18/2022  Treating Resident Physician: Maria Ines Scott MD  Supervising Physician: Tammy Villalobos MD    CHIEF COMPLAINT     No chief complaint on file. History obtained from chart review and the patient. HISTORY OF PRESENT ILLNESS    Laura Coker is a 37 y.o. female who presents to the emergency department for evaluation of shortness of breath. Manas Blake has had worsening shortness of breath over the past 3-4 days. This evening when she was going up stairs, her dyspnea became significantly worse and called EMS. She received 1 albuterol nebulizer during transport with some improvement in dyspnea. No chest pain, no leg swelling. Medical history significant for current smoker, not on home oxygen, unclear history of COPD vs asthma. She has not received a vaccine against COVID-19 or seasonal influenza. The patient has no other acute complaints at this time. REVIEW OF SYSTEMS   Review of Systems   Constitutional: Positive for fatigue. Negative for activity change, appetite change, chills, diaphoresis, fever and unexpected weight change. HENT: Negative for rhinorrhea, sneezing and sore throat. Respiratory: Positive for cough, chest tightness, shortness of breath and wheezing. Cardiovascular: Negative for chest pain and leg swelling. Gastrointestinal: Negative for abdominal pain, constipation, diarrhea, nausea and vomiting. Genitourinary: Negative for dysuria and urgency. Musculoskeletal: Negative for back pain and neck pain. Skin: Negative for color change, pallor, rash and wound. Neurological: Negative for dizziness, tremors, syncope, weakness, light-headedness, numbness and headaches. Psychiatric/Behavioral: Negative for agitation and confusion. All other systems reviewed and are negative.         PAST MEDICAL AND SURGICAL HISTORY     Past Medical History:   Diagnosis Date    Anxiety     Depression     Diabetes mellitus ([facility] [address]     Hyperlipidemia     Hypertension      Past Surgical History:   Procedure Laterality Date    ABSCESS DRAINAGE           MEDICATIONS     Current Facility-Administered Medications:     amitriptyline (ELAVIL) tablet 10 mg, 10 mg, Oral, Nightly, Justice Ramos MD    aspirin chewable tablet 81 mg, 81 mg, Oral, Daily, Justice Ramos MD, 81 mg at 02/19/22 0758    atorvastatin (LIPITOR) tablet 40 mg, 40 mg, Oral, Nightly, Justice Ramos MD    dilTIAZem (CARDIZEM) tablet 30 mg, 30 mg, Oral, TID, Justice Ramos MD    gabapentin (NEURONTIN) capsule 800 mg, 800 mg, Oral, TID, Justice Ramos MD, 800 mg at 02/19/22 0750    insulin glargine (LANTUS) injection vial 50 Units, 50 Units, SubCUTAneous, Nightly, Justice Ramos MD, 50 Units at 02/19/22 0200    insulin lispro (HUMALOG) injection vial 0-12 Units, 0-12 Units, SubCUTAneous, TID WC, Justice Ramos MD, 8 Units at 02/19/22 0759    insulin lispro (HUMALOG) injection vial 0-6 Units, 0-6 Units, SubCUTAneous, Nightly, Justice Ramos MD, 3 Units at 02/19/22 0403    lisinopril (PRINIVIL;ZESTRIL) tablet 10 mg, 10 mg, Oral, Daily, Justice Ramos MD    metoprolol (LOPRESSOR) tablet 100 mg, 100 mg, Oral, BID, Justice Ramos MD    OLANZapine (ZYPREXA) tablet 10 mg, 10 mg, Oral, Nightly, Justice Ramos MD    budesonide-formoterol (SYMBICORT) 160-4.5 MCG/ACT inhaler 2 puff, 2 puff, Inhalation, BID, Justice Ramos MD, 2 puff at 02/19/22 0810    ipratropium-albuterol (DUONEB) nebulizer solution 1 ampule, 1 ampule, Inhalation, Q4H WA, Justice Ramos MD, 1 ampule at 02/19/22 0811    cefTRIAXone (ROCEPHIN) 1000 mg IVPB in 50 mL D5W minibag, 1,000 mg, IntraVENous, Q24H, Justice Ramos MD    azithromycin (ZITHROMAX) 500 mg in D5W 250ml addavial, 500 mg, IntraVENous, Q24H, Justice Ramos MD    sodium chloride flush 0.9 % injection 10 mL, 10 mL, IntraVENous, 2 times per day, Taz Bautista MD, 10 mL at 02/19/22 0803    sodium chloride flush 0.9 % injection 10 mL, 10 mL, IntraVENous, PRN, Taz Bautista MD    0.9 % sodium chloride infusion, 25 mL, IntraVENous, PRN, Taz Bautista MD    enoxaparin (LOVENOX) injection 40 mg, 40 mg, SubCUTAneous, Daily, Taz Bautista MD    ondansetron (ZOFRAN-ODT) disintegrating tablet 4 mg, 4 mg, Oral, Q8H PRN **OR** ondansetron (ZOFRAN) injection 4 mg, 4 mg, IntraVENous, Q6H PRN, Taz Bautista MD    polyethylene glycol (GLYCOLAX) packet 17 g, 17 g, Oral, Daily PRN, Taz Bautista MD    acetaminophen (TYLENOL) tablet 650 mg, 650 mg, Oral, Q6H PRN **OR** acetaminophen (TYLENOL) suppository 650 mg, 650 mg, Rectal, Q6H PRN, Taz Bautista MD    pantoprazole (PROTONIX) tablet 40 mg, 40 mg, Oral, QAM AC, Taz Bautista MD, 40 mg at 02/19/22 0625    glucose (GLUTOSE) 40 % oral gel 15 g, 15 g, Oral, PRN, Taz Bautista MD    glucagon (rDNA) injection 1 mg, 1 mg, IntraMUSCular, PRN, Taz Bautista MD    dextrose 5 % solution, 100 mL/hr, IntraVENous, PRN, Taz Bautista MD    predniSONE (DELTASONE) tablet 40 mg, 40 mg, Oral, Daily, Taz Bautista MD, 40 mg at 02/19/22 0750    dextrose bolus (hypoglycemia) 10% 125 mL, 125 mL, IntraVENous, PRN **OR** dextrose bolus (hypoglycemia) 10% 250 mL, 250 mL, IntraVENous, PRN, Taz Bautista MD    0.9 % sodium chloride infusion, , IntraVENous, Continuous, Barbara Ceja MD, Last Rate: 100 mL/hr at 02/19/22 0807, New Bag at 02/19/22 0807    nicotine (NICODERM CQ) 14 MG/24HR 1 patch, 1 patch, TransDERmal, Daily, Taz Bautista MD, 1 patch at 02/19/22 5590      SOCIAL HISTORY     Social History     Social History Narrative    Not on file     Social History     Tobacco Use    Smoking status: Current Every Day Smoker     Packs/day: 1.00     Years: 15.00     Pack years: 15.00     Types: Cigarettes    Smokeless tobacco: Never Used   Substance Use Topics    Alcohol use: No    Drug use: No         ALLERGIES     Allergies   Allergen Reactions    Codeine Hives         FAMILY HISTORY     Family History   Problem Relation Age of Onset    High Blood Pressure Mother     Heart Disease Mother     High Cholesterol Mother     Depression Mother     Stroke Mother     Diabetes Maternal Aunt     High Blood Pressure Maternal Aunt     High Cholesterol Maternal Aunt          PREVIOUS RECORDS   Previous records reviewed: prior ED visits and PCP notes reviewed. PHYSICAL EXAM     ED Triage Vitals   BP Temp Temp Source Pulse Resp SpO2 Height Weight   02/18/22 2025 02/18/22 2025 02/19/22 0042 02/18/22 2021 02/18/22 2021 02/18/22 2019 02/19/22 0527 02/19/22 0527   (!) 172/90 98.3 °F (36.8 °C) Oral 114 22 (!) 68 % 5' (1.524 m) 271 lb 2.7 oz (123 kg)     Initial vital signs and nursing assessment reviewed and abnormal from tachycardia. Body mass index is 52.93 kg/m². Pulsoximetry is abnormal per my interpretation. Additional Vital Signs:  Vitals:    02/19/22 0818   BP:    Pulse:    Resp: 18   Temp:    SpO2: 90%       Physical Exam  Vitals and nursing note reviewed. Constitutional:       General: She is in acute distress. Appearance: She is obese. She is ill-appearing and toxic-appearing. She is not diaphoretic. HENT:      Head: Normocephalic and atraumatic. Nose: Nose normal.      Mouth/Throat:      Dentition: Abnormal dentition. Pharynx: Oropharynx is clear. No posterior oropharyngeal erythema. Comments: Poor dentition, multiple missing teeth  Eyes:      General: No scleral icterus. Right eye: No discharge. Left eye: No discharge. Conjunctiva/sclera: Conjunctivae normal.   Cardiovascular:      Rate and Rhythm: Regular rhythm. Tachycardia present. Pulses: Normal pulses. Heart sounds: Normal heart sounds.    Pulmonary:      Effort: Tachypnea, accessory muscle usage service consulted for admission    ED RESULTS   Laboratory results:  Labs Reviewed   CBC WITH AUTO DIFFERENTIAL - Abnormal; Notable for the following components:       Result Value    WBC 17.7 (*)     RBC 5.81 (*)     Hemoglobin 17.8 (*)     Hematocrit 55.4 (*)     MCHC 32.1 (*)     RDW-SD 49.6 (*)     Segs Absolute 11.9 (*)     Monocytes Absolute 1.6 (*)     Immature Grans (Abs) 0.12 (*)     All other components within normal limits   BASIC METABOLIC PANEL W/ REFLEX TO MG FOR LOW K - Abnormal; Notable for the following components:    Chloride 95 (*)     Glucose 247 (*)     BUN 23 (*)     All other components within normal limits   HEPATIC FUNCTION PANEL - Abnormal; Notable for the following components:    Albumin 3.1 (*)     Total Bilirubin 0.2 (*)     Alkaline Phosphatase 160 (*)     All other components within normal limits   BRAIN NATRIURETIC PEPTIDE - Abnormal; Notable for the following components:    Pro-BNP 2561.0 (*)     All other components within normal limits   LACTATE, SEPSIS - Abnormal; Notable for the following components:    Lactic Acid, Sepsis 2.0 (*)     All other components within normal limits   C-REACTIVE PROTEIN - Abnormal; Notable for the following components:    CRP 3.77 (*)     All other components within normal limits   LACTATE DEHYDROGENASE - Abnormal; Notable for the following components:     (*)     All other components within normal limits   GLOMERULAR FILTRATION RATE, ESTIMATED - Abnormal; Notable for the following components:    Est, Glom Filt Rate 61 (*)     All other components within normal limits   PROCALCITONIN - Abnormal; Notable for the following components:    Procalcitonin 0.12 (*)     All other components within normal limits   BASIC METABOLIC PANEL W/ REFLEX TO MG FOR LOW K - Abnormal; Notable for the following components:    Chloride 96 (*)     Glucose 407 (*)     All other components within normal limits   CBC - Abnormal; Notable for the following components:    WBC 18.7 (*)     RBC 6.05 (*)     Hemoglobin 18.4 (*)     Hematocrit 58.1 (*)     MCHC 31.7 (*)     RDW-CV 14.6 (*)     RDW-SD 50.5 (*)     All other components within normal limits   BLOOD GAS, ARTERIAL - Abnormal; Notable for the following components:    PCO2 54 (*)     PO2 54 (*)     HCO3 31 (*)     Base Excess (Calculated) 3.7 (*)     All other components within normal limits   POCT GLUCOSE - Abnormal; Notable for the following components:    POC Glucose 268 (*)     All other components within normal limits   POCT GLUCOSE - Abnormal; Notable for the following components:    POC Glucose 281 (*)     All other components within normal limits   POCT GLUCOSE - Abnormal; Notable for the following components:    POC Glucose 315 (*)     All other components within normal limits   RAPID INFLUENZA A/B ANTIGENS   COVID-19, RAPID   CULTURE, BLOOD 1   CULTURE, BLOOD 2   TROPONIN   PROTIME-INR   APTT   LACTATE, SEPSIS   SPECIMEN REJECTION   ANION GAP   PERIPHERAL BLOOD SMEAR, PATH REVIEW   TSH WITH REFLEX   ANION GAP   GLOMERULAR FILTRATION RATE, ESTIMATED   LACTIC ACID   LACTIC ACID   POCT GLUCOSE       Radiologic studies results:  CTA CHEST W WO CONTRAST   Final Result   Impression:   Bilateral pneumonia with significant reactive adenopathy. Follow-up    recommended to ensure complete clearing of via chest radiography and    subsequent CT examination across the next 6-8 weeks. This document has been electronically signed by: Satinder Dunn MD on    02/18/2022 10:52 PM      All CTs at this facility use dose modulation techniques and iterative    reconstructions, and/or weight-based dosing   when appropriate to reduce radiation to a low as reasonably achievable. XR CHEST PORTABLE   Final Result   1. Cardiomegaly and possible mild congestive heart failure. 2.. Slightly increased density throughout both lung fields. **This report has been created using voice recognition software.  It may contain minor errors which are inherent in voice recognition technology. **      Final report electronically signed by DR Chapis Sim on 2/18/2022 9:01 PM          ED Medications administered this visit:   Medications   amitriptyline (ELAVIL) tablet 10 mg (10 mg Oral Not Given 2/19/22 0404)   aspirin chewable tablet 81 mg (81 mg Oral Given 2/19/22 0758)   atorvastatin (LIPITOR) tablet 40 mg (40 mg Oral Not Given 2/19/22 0405)   dilTIAZem (CARDIZEM) tablet 30 mg (has no administration in time range)   gabapentin (NEURONTIN) capsule 800 mg (800 mg Oral Given 2/19/22 0750)   insulin glargine (LANTUS) injection vial 50 Units (50 Units SubCUTAneous Given 2/19/22 0200)   insulin lispro (HUMALOG) injection vial 0-12 Units (8 Units SubCUTAneous Given 2/19/22 0759)   insulin lispro (HUMALOG) injection vial 0-6 Units (3 Units SubCUTAneous Given 2/19/22 0403)   lisinopril (PRINIVIL;ZESTRIL) tablet 10 mg (has no administration in time range)   metoprolol (LOPRESSOR) tablet 100 mg (100 mg Oral Not Given 2/19/22 0405)   OLANZapine (ZYPREXA) tablet 10 mg (10 mg Oral Not Given 2/19/22 0406)   budesonide-formoterol (SYMBICORT) 160-4.5 MCG/ACT inhaler 2 puff (2 puffs Inhalation Given 2/19/22 0810)   ipratropium-albuterol (DUONEB) nebulizer solution 1 ampule (1 ampule Inhalation Given 2/19/22 0811)   cefTRIAXone (ROCEPHIN) 1000 mg IVPB in 50 mL D5W minibag (has no administration in time range)   azithromycin (ZITHROMAX) 500 mg in D5W 250ml addavial (has no administration in time range)   sodium chloride flush 0.9 % injection 10 mL (10 mLs IntraVENous Given 2/19/22 0803)   sodium chloride flush 0.9 % injection 10 mL (has no administration in time range)   0.9 % sodium chloride infusion (has no administration in time range)   enoxaparin (LOVENOX) injection 40 mg (has no administration in time range)   ondansetron (ZOFRAN-ODT) disintegrating tablet 4 mg (has no administration in time range)     Or   ondansetron (ZOFRAN) injection 4 mg (has no Condition: condition: stable  Dispo: Admit to telemetry          This transcription was electronically signed. Parts of this transcriptions may have been dictated by use of voice recognition software and electronically transcribed, and parts may have been transcribed with the assistance of an ED scribe. The transcription may contain errors not detected in proofreading. Please refer to my supervising physician's documentation if my documentation differs.     Electronically Signed: Mary Logan MD, 02/19/22, 8:57 AM         Natalio St MD  Resident  02/19/22 4754

## 2022-02-19 NOTE — ED NOTES
Bed: 016A  Expected date:   Expected time:   Means of arrival:   Comments:  Kyara Randolph, RN  02/18/22 2017

## 2022-02-19 NOTE — PLAN OF CARE
Problem: Cardiovascular  Goal: No DVT, peripheral vascular complications  Outcome: Ongoing  Note: Patient getting Lovenox as ordered. Problem: Respiratory  Goal: O2 Sat > 90%  Outcome: Ongoing  Note: Pt currently on 6L nasal cannula. Sats 90%-92%. Encourage incentive spirometer. Patient short of breath with exertion. Problem: GI  Goal: No bowel complications  Outcome: Ongoing  Note: Bowel sounds active. No bm so far today. Problem:   Goal: Adequate urinary output  Outcome: Ongoing  Note: Patient having adequate urinary output. Problem: Discharge Planning:  Goal: Discharged to appropriate level of care  Description: Discharged to appropriate level of care  Outcome: Ongoing  Note: Discharge planning in progress. Patient plans to go home at discharge. No date at this time. Problem: Safety:  Goal: Free from accidental physical injury  Description: Free from accidental physical injury  Outcome: Ongoing  Note: Patient free from accidental physical injury. Problem: Daily Care:  Goal: Daily care needs are met  Description: Daily care needs are met  Outcome: Ongoing  Note: Patient able to complete ADL's with minimal assist.      Care plan reviewed with patient. Patient verbalize understanding of the plan of care and contribute to goal setting.

## 2022-02-19 NOTE — H&P
Medicine Admission History & Physical      Patient:  Theresa Casillas  YOB: 1980  Date of Service: 2/19/2022  MRN: [id number]   Acct: [de-identified]   Primary Care Physician: Rosalba Mukherjee, APRN - CNP    Admitting Faculty MD: Dasha Miller MD    Code Status: Full Code    Date of Service: Pt seen/examined in consultation on 2/19/2022     Assessment / Plan     Briefly, pt Theresa Casillas is a 37 y.o. female with a PMH of IDDMII, COPD, active smoker, depression/anxiety, HTN, HLD who presented with SOB and worsening hypoxia. #Acute hypoxic respiratory failure: Presents with worsening dyspnea, wheezing. SPO2 68% on room air in the ED, no home O2. Initiated on NRB and weaned to 6 L NC.  CXR: Cardiomegaly, possible mild congestive heart failure, slightly increased densities through both lung fields. CTA chest: Bilateral pneumonia with significant reactive adenopathy. Denies sputum production, fever, chills. BNP elevated 2561. Last echo 2017, EF 60%. Denies previous cardiac history. History of COPD, no formal PFTs but on home Symbicort, albuterol. Suspected multifactorial with contributions from COPD, possible CHF, CAP. Plan:  -Continue supplemental oxygen, wean as tolerated  -Symbicort, duo nebs ordered  -Pulmonary toilet  -Echocardiogram ordered - we will consider adding diuretic pending results  -Can continue Rocephin 1 g daily, azithromycin 500 mg daily    #CAP, bilateral: Presents with respiratory failure, received Rocephin/azithromycin in the ED  CTA chest, CXR per above. Denies fevers, chills, sputum production. Leukocytosis on admission, WBC 17.7. Will treat as community-acquired pneumonia given patient respiratory failure, leukocytosis, and imaging results. Plan:  -Rocephin 1 g daily X 5 days  -Azithromycin 500 mg daily X 3 days  -Respiratory failure treatment per above    #Sepsis (PoA) 2/2 cap without shock: Stable  Source: CAP. High clinical suspicion of sepsis.  SIRS 3/4 (WBC: 17.8, RR: 21 and HR: 105). BP: 158/90. Received 1000cc IVNS bolus in ED. Lactate 2.1. Blood cultures x2 obtained. Abx in ED: ceftriaxone, azithromycin. Plan:   -Ceftriaxone, azithromycin per above  -Repeat lactic acid  -Start IV NS @ 125cc/hr    #COPD, suspected: History of. Denies any previous PFTs, but being treated for COPD by outpatient PCP. She is on Symbicort and albuterol rescue inhaler at home. She denies any significant sputum production upon arrival.  Has been taking inhalers at home, but feels she has not been able to get good inspiratory effort. Plan:  -Symbicort, DuoNeb  -Prednisone 40mg X 5 days  -CAP/AHRF of treatment per above   -Patient would likely benefit from outpatient pulmonology visit with PFTs  -ABG ordered as patient reportedly acting strange at home    #IDDMII: Presents with history of diabetes mellitus, last A1c 5.6%. Patient on Lantus 70 units daily, short acting lispro with meals and a sliding scale. She is also on pioglitazone, Metformin, Januvia, glipizide at home. Plan:  -Lantus 50 units daily, adjust dose as medically indicated  -We will initiate sliding scale, add on mealtime short acting insulin as indicated  -Hypoglycemia protocols, Accu-Cheks  -Held oral BM medications. #Polycythemia: Hgb 17.8 on arrival. Has had previously charted elevated hemoglobins. Likely secondary to chronic hypoxia from undiagnosed COPD. Plan:  -Peripheral smear ordered  -COPD treatment per above    #Depression/anxiety: On home BuSpar, gabapentin  Plan:  -Continue home medications    #HTN: On home lisinopril 10 mg daily  Plan:  -Continue home lisinopril    #HLD: On home Lipitor 40 mg  Plan:  -Continue home statin    #Depression/anxiety: On home BuSpar, gabapentin  Plan:  -Continue home medications    #Obesity: BMI 39.06. May benefit from bariatric evaluation.      Fluids: NS  Electrolyte: Replete as needed   Nutrition: Diabetic diet  GI: Protonix  DVT ppx:  Lovenox  PT/OT/SLP: PT/OT    Admit to: med/surg    General Medicine History and Physical     Chief Complaint with Duration:  Worsening SOB, hypoxia    History of Present Illness:  Matthew Araiza is a 37 y.o. female with a PMH of IDDMII, COPD, active smoker, depression/anxiety, HTN, HLD who presented with SOB and worsening hypoxia. Patient presents with worsening shortness of breath. Family in room to assist with history. Symptoms reportedly began about 4 days prior to admission. She describes worsening dyspnea and increased wheezing over the last few days. No obvious inciting incident noted by patient. She denies any chest pain, fever, chills, cough. She states she does not have increased sputum production at home. She is not on any home O2. She is being treated outpatient for COPD with Symbicort and albuterol inhalers, states she has not seen pulmonology previously and has not had formal PFTs done. Per family, patient has been acting strangely at home with some mild confusion and worsening physical condition. Son states that she has had increased difficulty ambulating throughout the house. Patient also endorsing some diaphoresis at home, but states that her heart rate is typically high but has noticed heart racing the last few days. Patient states she has no previous cardiac history, last echo obtained was 5 years ago. She is on aspirin 81mg daily but unsure why. Summarized ED course: Initial vital signs included temperature 98.3, respiration rate 22, pulse 114, SPO2 60% on room air. She was initiated on nonrebreather with improvement of SPO2 to 94%. She is eventually weaned down to 6 L NC. Patient received one-time dose of Rocephin and azithromycin for community-acquired pneumonia. CTA chest obtained demonstrated bilateral pneumonia significant reactive adenopathy. CXR demonstrated cardiomegaly and possible mild congestive heart failure.   Patient was admitted for further evaluation management of her acute hypoxic respiratory failure. Admission Labs/Diagnostics:   Sodium 135, potassium 4.2, chloride 95, CO2 29, BUN 23, creatinine 1.0   CRP 3.77, , BNP 2561, troponin negative   WBC 17.7, Hgb 17.8, HCT 55.4, platelets 036   CXR/CT at bedtime per above   EKG: Sinus tachycardia      Past Medical History Past Surgical History    has a past medical history of Anxiety, Depression, Diabetes mellitus ([address] Hyperlipidemia, and Hypertension. has a past surgical history that includes Abscess Drainage. Social History Family History    reports that she has been smoking cigarettes. She has a 15.00 pack-year smoking history. She has never used smokeless tobacco. She reports that she does not drink alcohol and does not use drugs. family history includes Depression in her mother; Diabetes in her maternal aunt; Heart Disease in her mother; High Blood Pressure in her maternal aunt and mother; High Cholesterol in her maternal aunt and mother; Stroke in her mother. Outpatient Medications   Current Outpatient Medications   Medication Instructions    amitriptyline (ELAVIL) 10 mg, Oral, NIGHTLY    aspirin 81 mg, DAILY    atorvastatin (LIPITOR) 40 mg, Oral, NIGHTLY    Blood Pressure Monitor KIT 1 kit, Does not apply, PRN    diclofenac sodium (VOLTAREN) 2 g, Topical, 2 TIMES DAILY    dilTIAZem (CARDIZEM) 30 mg, Oral, 3 TIMES DAILY    gabapentin (NEURONTIN) 800 mg, 3 TIMES DAILY    glimepiride (AMARYL) 4 mg, 2 TIMES DAILY WITH MEALS    ibuprofen (ADVIL;MOTRIN) 600 mg, Oral, 3 TIMES DAILY PRN    insulin aspart (NOVOLOG) 25 Units, SubCUTAneous, 3 TIMES DAILY BEFORE MEALS    insulin glargine (LANTUS) 30 Units, SubCUTAneous, 2 TIMES DAILY    ivabradine (CORLANOR) 5 mg, Oral, 2 TIMES DAILY WITH MEALS    Lactobacillus (PROBIOTIC ACIDOPHILUS) CAPS 1 capsule, Oral, DAILY    lidocaine (LIDODERM) 5 % 1 patch, TransDERmal, DAILY, 12 hours on, 12 hours off.     lisinopril (PRINIVIL;ZESTRIL) 10 MG tablet take 1 tablet by mouth once daily  medroxyPROGESTERone (DEPO-PROVERA) 150 mg, IntraMUSCular, EVERY 3 MONTHS    metFORMIN (GLUCOPHAGE) 2,000 mg, Oral, 2 TIMES DAILY WITH MEALS    metoprolol (LOPRESSOR) 100 mg, Oral, 2 TIMES DAILY, Hold sbp less 110 HR less than 55    OLANZapine (ZYPREXA) 10 mg, Oral, NIGHTLY    ondansetron (ZOFRAN ODT) 4 mg, Oral, EVERY 8 HOURS PRN    pregabalin (LYRICA) 200 mg, Oral, 2 TIMES DAILY        Allergies   Codeine     Immunizations     There is no immunization history on file for this patient. Review of Systems   Constitutional: Positive for activity change, diaphoresis and fatigue. Negative for chills, fever and unexpected weight change. Respiratory: Positive for shortness of breath and wheezing. Negative for cough. Cardiovascular: Negative for chest pain, palpitations and leg swelling. Gastrointestinal: Negative for abdominal distention, abdominal pain and blood in stool. Genitourinary: Negative for dysuria, enuresis and flank pain. - negative except for the aforementioned    Objective     Vitals:  BP (!) 158/90   Pulse 105   Temp 98.3 °F (36.8 °C)   Resp 21   SpO2 91%       Exam:  General appearance: No apparent distress, appears stated age and cooperative. HEENT: Pupils equal, round, and reactive to light. Conjunctivae/corneas clear. Neck: Supple, with full range of motion. No jugular venous distention. Trachea midline. Respiratory: Poor respiratory effort. Bilateral diffuse wheezing L>R. Decreased breath sounds bilateral bases. Cardiovascular: Tachycardia, normal rhythm with normal S1/S2 without murmurs, rubs or gallops. Abdomen: Soft, non-tender, non-distended with normal bowel sounds. Musculoskeletal: passive and active ROM x 4 extremities. Skin: Skin color, texture, turgor normal.  No rashes or lesions. Neurologic:  Neurovascularly intact without any focal sensory/motor deficits.  Cranial nerves: II-XII intact, grossly non-focal.  Psychiatric: Alert and oriented, thought content U/L    Total Protein 7.0 6.1 - 8.0 g/dL   Troponin   Result Value Ref Range    Troponin T < 0.010 ng/ml   Brain Natriuretic Peptide   Result Value Ref Range    Pro-BNP 2561.0 (H) 0.0 - 450.0 pg/mL   Protime-INR   Result Value Ref Range    INR 0.95 0.85 - 1.13   APTT   Result Value Ref Range    aPTT 28.7 22.0 - 38.0 seconds   Lactate, Sepsis   Result Value Ref Range    Lactic Acid, Sepsis 2.0 (H) 0.5 - 1.9 mmol/L   Lactate, Sepsis   Result Value Ref Range    Lactic Acid, Sepsis 1.7 0.5 - 1.9 mmol/L   C-Reactive Protein   Result Value Ref Range    CRP 3.77 (H) 0.00 - 1.00 mg/dl   Lactate Dehydrogenase   Result Value Ref Range     (H) 100 - 190 U/L   SPECIMEN REJECTION   Result Value Ref Range    Rejected Test [facility]     Reason for Rejection see below    Anion Gap   Result Value Ref Range    Anion Gap 11.0 8.0 - 16.0 meq/L   Glomerular Filtration Rate, Estimated   Result Value Ref Range    Est, Glom Filt Rate 61 (A) ml/min/1.73m2   Procalcitonin   Result Value Ref Range    Procalcitonin 0.12 (H) 0.01 - 0.09 ng/mL   POCT Glucose   Result Value Ref Range    POC Glucose 268 (H) 70 - 108 mg/dl   EKG 12 Lead   Result Value Ref Range    Ventricular Rate 113 BPM    Atrial Rate 113 BPM    P-R Interval 130 ms    QRS Duration 80 ms    Q-T Interval 318 ms    QTc Calculation (Bazett) 436 ms    P Axis 76 degrees    R Axis 57 degrees    T Axis 65 degrees       Diagnostics:  CTA CHEST W WO CONTRAST    Result Date: 2/18/2022  CTA chest Comparison: None. Findings: Uniform density through the thyroid gland. Lymphoid hyperplasia in the superior mediastinum, hilar regions and infracarinal tissues. Adenopathy may be reactive to scattered atelectasis and infiltrate through the lungs bilaterally. There is groundglass opacification centrally in the upper lobes with predominantly atelectasis and consolidative infiltrate scattered through the lower lobes and right middle lobe. Minimal airspace opacification is noted to the lingula.  There is no evidence of pulmonary embolism or aortic dissection. Small hernia is evident with moderate volume of air seen into the distal esophagus suggesting underlying gastroesophageal reflux disease, correlate clinically. The included portions of the upper abdomen are notable for hepatomegaly. Axillary lymph nodes are benign appearing. The patient's breast tissues are that of scattered fibroglandular nature. For healthcare maintenance purposes she should be reminded that the [employer] [facility] Rad[facility] recommends screening mammography commencing at the age of 37. Impression: Bilateral pneumonia with significant reactive adenopathy. Follow-up recommended to ensure complete clearing of via chest radiography and subsequent CT examination across the next 6-8 weeks. This document has been electronically signed by: Sonia Neves MD on 02/18/2022 10:52 PM All CTs at this facility use dose modulation techniques and iterative reconstructions, and/or weight-based dosing when appropriate to reduce radiation to a low as reasonably achievable. XR CHEST PORTABLE    Result Date: 2/18/2022  PROCEDURE: XR CHEST PORTABLE CLINICAL INFORMATION: SOB. COMPARISON: Chest x-ray dated 23 May 2019. Donzell Pyo TECHNIQUE: AP upright view of the chest. FINDINGS: There is mild cardiomegaly. .The mediastinum is not widened. There is slightly increased density throughout both lung fields. There are no effusions. . The pulmonary vascularity is increased. No suspicious osseous lesions are present. 1. Cardiomegaly and possible mild congestive heart failure. 2.. Slightly increased density throughout both lung fields. **This report has been created using voice recognition software. It may contain minor errors which are inherent in voice recognition technology. ** Final report electronically signed by DR Cailin Santo on 2/18/2022 9:01 PM      EKG:   As above    Micro:  No active infections    Signed:  Jodi Dumas MD  Internal Medicine, PGY-1  02/19/22  12:31 AM    Staff:  Roddy Bui MD Where Do You Want The Question To Include Opioid Counseling Located?: Case Summary Tab

## 2022-02-19 NOTE — ED NOTES
Attempting to wean pt off non rebreather, pt placed on 6 liters O2 via n/c SPO2 94%.       Frieda Palacios RN  02/18/22 2047

## 2022-02-20 LAB
ALBUMIN SERPL-MCNC: 2.9 G/DL (ref 3.5–5.1)
ALP BLD-CCNC: 151 U/L (ref 38–126)
ALT SERPL-CCNC: 54 U/L (ref 11–66)
ANION GAP SERPL CALCULATED.3IONS-SCNC: 9 MEQ/L (ref 8–16)
AST SERPL-CCNC: 33 U/L (ref 5–40)
BASOPHILS # BLD: 0.5 %
BASOPHILS ABSOLUTE: 0.1 THOU/MM3 (ref 0–0.1)
BILIRUB SERPL-MCNC: 0.2 MG/DL (ref 0.3–1.2)
BUN BLDV-MCNC: 31 MG/DL (ref 7–22)
CALCIUM SERPL-MCNC: 7.9 MG/DL (ref 8.5–10.5)
CHLORIDE BLD-SCNC: 101 MEQ/L (ref 98–111)
CHOLESTEROL, TOTAL: 112 MG/DL (ref 100–199)
CO2: 27 MEQ/L (ref 23–33)
CREAT SERPL-MCNC: 1.4 MG/DL (ref 0.4–1.2)
EOSINOPHIL # BLD: 0.5 %
EOSINOPHILS ABSOLUTE: 0.1 THOU/MM3 (ref 0–0.4)
ERYTHROCYTE [DISTWIDTH] IN BLOOD BY AUTOMATED COUNT: 14.7 % (ref 11.5–14.5)
ERYTHROCYTE [DISTWIDTH] IN BLOOD BY AUTOMATED COUNT: 55.7 FL (ref 35–45)
GFR SERPL CREATININE-BSD FRML MDRD: 42 ML/MIN/1.73M2
GLUCOSE BLD-MCNC: 196 MG/DL (ref 70–108)
GLUCOSE BLD-MCNC: 215 MG/DL (ref 70–108)
GLUCOSE BLD-MCNC: 232 MG/DL (ref 70–108)
GLUCOSE BLD-MCNC: 303 MG/DL (ref 70–108)
GLUCOSE BLD-MCNC: 304 MG/DL (ref 70–108)
HCT VFR BLD CALC: 57.4 % (ref 37–47)
HDLC SERPL-MCNC: 37 MG/DL
HEMOGLOBIN: 17 GM/DL (ref 12–16)
IMMATURE GRANS (ABS): 0.09 THOU/MM3 (ref 0–0.07)
IMMATURE GRANULOCYTES: 0.5 %
LACTIC ACID: 1.2 MMOL/L (ref 0.5–2)
LDL CHOLESTEROL CALCULATED: 51 MG/DL
LYMPHOCYTES # BLD: 12.8 %
LYMPHOCYTES ABSOLUTE: 2.2 THOU/MM3 (ref 1–4.8)
MCH RBC QN AUTO: 30.5 PG (ref 26–33)
MCHC RBC AUTO-ENTMCNC: 29.6 GM/DL (ref 32.2–35.5)
MCV RBC AUTO: 103.1 FL (ref 81–99)
MONOCYTES # BLD: 4.5 %
MONOCYTES ABSOLUTE: 0.8 THOU/MM3 (ref 0.4–1.3)
NUCLEATED RED BLOOD CELLS: 0 /100 WBC
PLATELET # BLD: 300 THOU/MM3 (ref 130–400)
PMV BLD AUTO: 10.2 FL (ref 9.4–12.4)
POTASSIUM SERPL-SCNC: 5 MEQ/L (ref 3.5–5.2)
RBC # BLD: 5.57 MILL/MM3 (ref 4.2–5.4)
SEG NEUTROPHILS: 81.2 %
SEGMENTED NEUTROPHILS ABSOLUTE COUNT: 14.1 THOU/MM3 (ref 1.8–7.7)
SODIUM BLD-SCNC: 137 MEQ/L (ref 135–145)
TOTAL PROTEIN: 6.6 G/DL (ref 6.1–8)
TRIGL SERPL-MCNC: 119 MG/DL (ref 0–199)
WBC # BLD: 17.4 THOU/MM3 (ref 4.8–10.8)

## 2022-02-20 PROCEDURE — 6370000000 HC RX 637 (ALT 250 FOR IP)

## 2022-02-20 PROCEDURE — 94760 N-INVAS EAR/PLS OXIMETRY 1: CPT

## 2022-02-20 PROCEDURE — 6360000002 HC RX W HCPCS

## 2022-02-20 PROCEDURE — 1200000003 HC TELEMETRY R&B

## 2022-02-20 PROCEDURE — 6360000002 HC RX W HCPCS: Performed by: INTERNAL MEDICINE

## 2022-02-20 PROCEDURE — 2700000000 HC OXYGEN THERAPY PER DAY

## 2022-02-20 PROCEDURE — 36415 COLL VENOUS BLD VENIPUNCTURE: CPT

## 2022-02-20 PROCEDURE — 85025 COMPLETE CBC W/AUTO DIFF WBC: CPT

## 2022-02-20 PROCEDURE — 94640 AIRWAY INHALATION TREATMENT: CPT

## 2022-02-20 PROCEDURE — 6370000000 HC RX 637 (ALT 250 FOR IP): Performed by: INTERNAL MEDICINE

## 2022-02-20 PROCEDURE — 99233 SBSQ HOSP IP/OBS HIGH 50: CPT | Performed by: INTERNAL MEDICINE

## 2022-02-20 PROCEDURE — 2580000003 HC RX 258: Performed by: INTERNAL MEDICINE

## 2022-02-20 PROCEDURE — 80061 LIPID PANEL: CPT

## 2022-02-20 PROCEDURE — 1200000000 HC SEMI PRIVATE

## 2022-02-20 PROCEDURE — 99254 IP/OBS CNSLTJ NEW/EST MOD 60: CPT | Performed by: INTERNAL MEDICINE

## 2022-02-20 PROCEDURE — 80053 COMPREHEN METABOLIC PANEL: CPT

## 2022-02-20 PROCEDURE — 2580000003 HC RX 258

## 2022-02-20 PROCEDURE — 83605 ASSAY OF LACTIC ACID: CPT

## 2022-02-20 PROCEDURE — 82948 REAGENT STRIP/BLOOD GLUCOSE: CPT

## 2022-02-20 RX ORDER — LANOLIN ALCOHOL/MO/W.PET/CERES
9 CREAM (GRAM) TOPICAL NIGHTLY PRN
Status: DISCONTINUED | OUTPATIENT
Start: 2022-02-21 | End: 2022-02-20

## 2022-02-20 RX ORDER — LANOLIN ALCOHOL/MO/W.PET/CERES
9 CREAM (GRAM) TOPICAL NIGHTLY PRN
Status: DISCONTINUED | OUTPATIENT
Start: 2022-02-20 | End: 2022-02-25 | Stop reason: HOSPADM

## 2022-02-20 RX ADMIN — IPRATROPIUM BROMIDE AND ALBUTEROL SULFATE 1 AMPULE: .5; 3 SOLUTION RESPIRATORY (INHALATION) at 16:13

## 2022-02-20 RX ADMIN — ENOXAPARIN SODIUM 60 MG: 100 INJECTION SUBCUTANEOUS at 11:33

## 2022-02-20 RX ADMIN — SODIUM CHLORIDE: 9 INJECTION, SOLUTION INTRAVENOUS at 03:22

## 2022-02-20 RX ADMIN — ENOXAPARIN SODIUM 60 MG: 100 INJECTION SUBCUTANEOUS at 23:54

## 2022-02-20 RX ADMIN — ASPIRIN 81 MG CHEWABLE TABLET 81 MG: 81 TABLET CHEWABLE at 07:47

## 2022-02-20 RX ADMIN — GABAPENTIN 800 MG: 400 CAPSULE ORAL at 15:29

## 2022-02-20 RX ADMIN — DILTIAZEM HYDROCHLORIDE 30 MG: 30 TABLET, FILM COATED ORAL at 21:27

## 2022-02-20 RX ADMIN — METOPROLOL TARTRATE 50 MG: 50 TABLET, FILM COATED ORAL at 21:28

## 2022-02-20 RX ADMIN — SODIUM CHLORIDE: 9 INJECTION, SOLUTION INTRAVENOUS at 19:57

## 2022-02-20 RX ADMIN — LISINOPRIL 20 MG: 20 TABLET ORAL at 21:28

## 2022-02-20 RX ADMIN — BUDESONIDE AND FORMOTEROL FUMARATE DIHYDRATE 2 PUFF: 160; 4.5 AEROSOL RESPIRATORY (INHALATION) at 20:19

## 2022-02-20 RX ADMIN — DILTIAZEM HYDROCHLORIDE 30 MG: 30 TABLET, FILM COATED ORAL at 07:47

## 2022-02-20 RX ADMIN — LISINOPRIL 20 MG: 20 TABLET ORAL at 07:48

## 2022-02-20 RX ADMIN — IPRATROPIUM BROMIDE AND ALBUTEROL SULFATE 1 AMPULE: .5; 3 SOLUTION RESPIRATORY (INHALATION) at 20:19

## 2022-02-20 RX ADMIN — CEFTRIAXONE SODIUM 2000 MG: 2 INJECTION, POWDER, FOR SOLUTION INTRAMUSCULAR; INTRAVENOUS at 20:19

## 2022-02-20 RX ADMIN — AMITRIPTYLINE HYDROCHLORIDE 10 MG: 10 TABLET, FILM COATED ORAL at 21:28

## 2022-02-20 RX ADMIN — GABAPENTIN 800 MG: 400 CAPSULE ORAL at 07:47

## 2022-02-20 RX ADMIN — PANTOPRAZOLE SODIUM 40 MG: 40 TABLET, DELAYED RELEASE ORAL at 05:59

## 2022-02-20 RX ADMIN — OLANZAPINE 10 MG: 10 TABLET, FILM COATED ORAL at 23:41

## 2022-02-20 RX ADMIN — GABAPENTIN 800 MG: 400 CAPSULE ORAL at 21:28

## 2022-02-20 RX ADMIN — PREDNISONE 40 MG: 20 TABLET ORAL at 07:47

## 2022-02-20 RX ADMIN — SODIUM CHLORIDE: 9 INJECTION, SOLUTION INTRAVENOUS at 11:21

## 2022-02-20 RX ADMIN — INSULIN GLARGINE 60 UNITS: 100 INJECTION, SOLUTION SUBCUTANEOUS at 21:34

## 2022-02-20 RX ADMIN — DILTIAZEM HYDROCHLORIDE 30 MG: 30 TABLET, FILM COATED ORAL at 15:29

## 2022-02-20 RX ADMIN — METOPROLOL TARTRATE 50 MG: 50 TABLET, FILM COATED ORAL at 07:47

## 2022-02-20 RX ADMIN — IPRATROPIUM BROMIDE AND ALBUTEROL SULFATE 1 AMPULE: .5; 3 SOLUTION RESPIRATORY (INHALATION) at 12:39

## 2022-02-20 RX ADMIN — AZITHROMYCIN DIHYDRATE 500 MG: 500 INJECTION, POWDER, LYOPHILIZED, FOR SOLUTION INTRAVENOUS at 21:32

## 2022-02-20 RX ADMIN — SODIUM CHLORIDE, PRESERVATIVE FREE 10 ML: 5 INJECTION INTRAVENOUS at 20:20

## 2022-02-20 RX ADMIN — BUDESONIDE AND FORMOTEROL FUMARATE DIHYDRATE 2 PUFF: 160; 4.5 AEROSOL RESPIRATORY (INHALATION) at 07:58

## 2022-02-20 RX ADMIN — ATORVASTATIN CALCIUM 40 MG: 40 TABLET, FILM COATED ORAL at 21:28

## 2022-02-20 RX ADMIN — IPRATROPIUM BROMIDE AND ALBUTEROL SULFATE 1 AMPULE: .5; 3 SOLUTION RESPIRATORY (INHALATION) at 07:58

## 2022-02-20 NOTE — PROGRESS NOTES
Hospitalist Progress Note    Patient:  Jacquie Chavez      Unit/Bed:7K-27/027-A    YOB: 1980    MRN: [id number]       Acct: [de-identified]     PCP: ROBBIE Morley CNP    Date of Admission: 2/18/2022    Active Hospital Problems    Diagnosis Date Noted    Acute respiratory failure with hypoxia [facility]) [J96.01] 02/18/2022       Assessment/Plan:    Sepsis  Acute respiratory failure due to CAP  DM-2  Obesity  Essential HTN  Sleep disturbances  Tobacco dependence      Continue with IV antibiotics, I increased the Rocephin to 2 gm daily ,   Continue with IV zithro, and follow up blood cultures. She was encouraged to be careful with her diet, as her BMI is markedly elevated. Pulmonary consult pending, remains on 6 L NC. Sepsis likely resolved, lactic is down to 1.2 , on IV hdyration. DM-2 not optimally controlled, on prednisone, increase lantus and SS to group -2 and checked A1c  Its 6.5 . Sugars better after increasing the insulin, today 196-215     HTN - increase BP meds, added BB and increase ACE, titrate as needed    She likely has JACIEL - based on son's input, she stops breathing several times, and snores heavily, with high BMI. So she was educated on this, and outpatient sleep studies. Smoking cessation recommended. Obesity needs life style changes with dietary and increase in exercise. Expected discharge date:   in couple of day s         Disposition:      [x] Home                             [] TCU                             [] Rehab                             [] Psych                             [] SNF                             [] [facility]                             [] Other-    Chief Complaint: [facility] Course: Patient was seen, examined and the medical chart was reviewed thoroughly today. In summary, 37 y. o.female admitted on 2/18/2022 for sepsis, and PNA  Along with marked obesity, and tobacco use.  She is on broad spectrum IV antibiotics, and IVF, will recheck lactate, mildly elevated   Noted the Echo normal EF, not in CHF at this time. Looks better but remains on 6 LNC       Subjective (past 24 hours):  breathing better eating food, no CP or LOC, mild SOB but better,  No active GI issues      Medications:  Reviewed    Infusion Medications    sodium chloride      dextrose      sodium chloride 125 mL/hr at 02/20/22 1121     Scheduled Medications    amitriptyline  10 mg Oral Nightly    aspirin  81 mg Oral Daily    atorvastatin  40 mg Oral Nightly    dilTIAZem  30 mg Oral TID    gabapentin  800 mg Oral TID    OLANZapine  10 mg Oral Nightly    budesonide-formoterol  2 puff Inhalation BID    ipratropium-albuterol  1 ampule Inhalation Q4H WA    azithromycin  500 mg IntraVENous Q24H    sodium chloride flush  10 mL IntraVENous 2 times per day    pantoprazole  40 mg Oral QAM AC    predniSONE  40 mg Oral Daily    enoxaparin  60 mg SubCUTAneous Q12H    insulin glargine  60 Units SubCUTAneous Nightly    insulin lispro  0-18 Units SubCUTAneous TID WC    insulin lispro  0-9 Units SubCUTAneous Nightly    cefTRIAXone (ROCEPHIN) IV  2,000 mg IntraVENous Q24H    lisinopril  20 mg Oral BID    metoprolol  50 mg Oral BID    nicotine  1 patch TransDERmal Daily     PRN Meds: sodium chloride flush, sodium chloride, ondansetron **OR** ondansetron, polyethylene glycol, acetaminophen **OR** acetaminophen, glucose, glucagon (rDNA), dextrose, dextrose bolus (hypoglycemia) **OR** dextrose bolus (hypoglycemia)    No intake or output data in the 24 hours ending 02/20/22 1333    Diet:  ADULT DIET; Regular; 5 carb choices (75 gm/meal)    Exam:  /73   Pulse 85   Temp 98.3 °F (36.8 °C) (Oral)   Resp 20   Ht 5' (1.524 m)   Wt 271 lb (122.9 kg)   SpO2 90%   BMI 52.93 kg/m²     General appearance: A&O x3, Not ill or toxic, in no apparent distress  HEENT:  JUNI  EOM intact. Neck: Supple, with full range of motion. No jugular venous distention.  Trachea midline. Respiratory:   NL A/E bilat with no adventitious sounds   Cardiovascular:  normal S1/S2 with no murmurs/gallops  Abdomen: Soft, non-tender, non-distended, no rigidity or peritoneal signs  Musculoskeletal: NL symmetrical A/PROM bilat U/L extremities   Skin: No rashes. No edema  Neurologic:  CN II-XII intact. NL symmetrical reflexes. NL gait and stance. NL Cerebellar exam. Power 5/5 all muscle groups U/L extremities. Toes downgoing  Capillary Refill: Brisk,< 3 seconds   Peripheral Pulses: +2 palpable, equal bilaterally        Labs:   Recent Labs     02/18/22 2025 02/19/22 0414 02/20/22  1029   WBC 17.7* 18.7* 17.4*   HGB 17.8* 18.4* 17.0*   HCT 55.4* 58.1* 57.4*    273 300     Recent Labs     02/18/22 2025 02/19/22  0414 02/20/22  1029    135 137   K 4.2 5.0 5.0   CL 95* 96* 101   CO2 29 29 27   BUN 23* 20 31*   CREATININE 1.0 0.7 1.4*   CALCIUM 9.0 9.0 7.9*     Recent Labs     02/18/22 2025 02/20/22  1029   AST 33 33   ALT 42 54   BILIDIR <0.2  --    BILITOT 0.2* 0.2*   ALKPHOS 160* 151*     Recent Labs     02/18/22 2025   INR 0.95     No results for input(s): Jasen Villalobos in the last 72 hours.     Urinalysis:      Lab Results   Component Value Date    NITRU NEGATIVE 08/08/2018    WBCUA 10-15 08/08/2018    WBCUA 5-10 05/31/2012    BACTERIA FEW 08/08/2018    RBCUA 0-2 08/08/2018    BLOODU TRACE 08/08/2018    SPECGRAV >=1.030 01/04/2013    GLUCOSEU >= 1000 08/08/2018       Radiology:    ECHO Complete 2D W Doppler W Color    Result Date: 2/19/2022  Transthoracic Echocardiography Report (TTE)  Demographics   Patient Name  Magnus Osuna  Gender             Female                S   MR #          [id number]    Race                                             Ethnicity   Account #     [de-identified]    Room Number        3437   Accession     [id number]   Date of Study      02/19/2022  Number   Date of Birth 1980   Referring          Mariam Conrad Physician          Cary Nicole MD   Age           37 year(s)   Sonographer        ALVINA Barker, RDCS,                                                RDMS, RVT                              Interpreting       Claudette Stone MD                             Physician  Procedure Type of Study   TTE procedure:ECHOCARDIOGRAM COMPLETE 2D W DOPPLER W COLOR. Procedure Date Date: 02/19/2022 Start: 09:31 AM Study Location: Bedside Technical Quality: Limited visualization due to body habitus. Indications:Acute respiratory failure. Additional Medical History:acute respiratory failure, abdominal pain, tachycardia, sepsis, diabetes, hypertension, morbid obesity, hyperlipidemia Patient Status: Routine Height: 60 inches Weight: 270.01 pounds BSA: 2.12 m^2 BMI: 52.73 kg/m^2 BP: 173/84 mmHg  Conclusions   Summary  Technically difficult examination. Left ventricular size and systolic function is normal. Ejection fraction  was estimated at 55-60%. LV wall thickness is within normal limits and  there are no obvious wall motion abnormalities. The right ventricular size appears normal with normal systolic function  and wall thickness. Signature   ----------------------------------------------------------------  Electronically signed by Claudette Stone MD (Interpreting  physician) on 02/19/2022 at 01:11 PM  ----------------------------------------------------------------   Findings   Mitral Valve  The mitral valve structure is normal with normal leaflet separation. DOPPLER: The transmitral velocity was within the normal range with no  evidence for mitral stenosis. There was no evidence of mitral  regurgitation. Aortic Valve  The aortic valve appears trileaflet with normal thickness and leaflet  excursion. DOPPLER: Transaortic velocity was within the normal range with  no evidence of aortic stenosis. There was no evidence of aortic  regurgitation.    Tricuspid Valve  The tricuspid valve structure is normal with normal leaflet separation. DOPPLER: There is no evidence of tricuspid stenosis. There was no evidence  of tricuspid regurgitation. Pulmonic Valve  The pulmonic valve was not well visualized . Left Atrium  Left atrial size is normal.   Left Ventricle  Left ventricular size and systolic function is normal. Ejection fraction  was estimated at 55-60%. LV wall thickness is within normal limits and  there are no obvious wall motion abnormalities. Right Atrium  Right atrial size was normal.   Right Ventricle  The right ventricular size appears normal with normal systolic function  and wall thickness. Pericardial Effusion  The pericardium appears normal with no evidence of a pericardial effusion. Pleural Effusion  No evidence of pleural effusion. Aorta / Great Vessels  -Aortic root dimension within normal limits. -IVC size is within normal limits with normal respiratory phasic changes.   M-Mode/2D Measurements & Calculations   LV Diastolic      LV Systolic Dimension: 2.7 cm    LA Dimension: 3.9 cmLA  Dimension: 3.7 cm LV Volume Diastolic: 87.5 ml     Area: 12.1 cm^2  LV FS:27 %        LV Volume Systolic: 27 ml  LV PW Diastolic:  LV EDV/LV EDV Index: 58.1 ml/27  1.3 cm            m^2LV ESV/LV ESV Index: 27 HR/61  Septum Diastolic: m^2                              RV Diastolic Dimension:  1.3 cm            EF Calculated: 53.5 %            1.8 cm                                                      Ascending Aorta: 2.4 cm                                                     LA volume/Index: 26.8                                                     ml /13m^2  Doppler Measurements & Calculations   MV Peak E-Wave: 121 cm/s   AV Peak Velocity: 174  LVOT Peak Velocity: 120  MV Peak A-Wave: 136 cm/s   cm/s                   cm/s  MV E/A Ratio: 0.89         AV Peak Gradient:      LVOT Peak Gradient: 6  MV Peak Gradient: 5.86     12.11 mmHg             mmHg  mmHg                                                    TV Peak E-Wave: 53.6  MV Deceleration Time: 264                         cm/s  msec                             IVRT: 95 msec          TV Peak Gradient: 1.15                                                    mmHg  MV E' Septal Velocity: 7.7                        TR Velocity:244 cm/s  cm/s                       AV DVI (Vmax):0.69     TR Gradient:23.81 mmHg  MV A' Septal Velocity: 9.6                        PV Peak Velocity: 88.7  cm/s                                              cm/s  MV E' Lateral Velocity:                           PV Peak Gradient: 3.15  9.4 cm/s                                          mmHg  MV A' Lateral Velocity:  10.2 cm/s  E/E' septal: 15.71  E/E' lateral: 12.87  [id number]    CTA CHEST W WO CONTRAST    Result Date: 2/18/2022  CTA chest Comparison: None. Findings: Uniform density through the thyroid gland. Lymphoid hyperplasia in the superior mediastinum, hilar regions and infracarinal tissues. Adenopathy may be reactive to scattered atelectasis and infiltrate through the lungs bilaterally. There is groundglass opacification centrally in the upper lobes with predominantly atelectasis and consolidative infiltrate scattered through the lower lobes and right middle lobe. Minimal airspace opacification is noted to the lingula. There is no evidence of pulmonary embolism or aortic dissection. Small hernia is evident with moderate volume of air seen into the distal esophagus suggesting underlying gastroesophageal reflux disease, correlate clinically. The included portions of the upper abdomen are notable for hepatomegaly. Axillary lymph nodes are benign appearing. The patient's breast tissues are that of scattered fibroglandular nature. For healthcare maintenance purposes she should be reminded that the [employer] of Radiology recommends screening mammography commencing at the age of 37. Impression: Bilateral pneumonia with significant reactive adenopathy. Follow-up recommended to ensure complete clearing of via chest radiography and subsequent CT examination across the next 6-8 weeks. This document has been electronically signed by: Rochelle Blake MD on 02/18/2022 10:52 PM All CTs at this facility use dose modulation techniques and iterative reconstructions, and/or weight-based dosing when appropriate to reduce radiation to a low as reasonably achievable. XR CHEST PORTABLE    Result Date: 2/18/2022  PROCEDURE: XR CHEST PORTABLE CLINICAL INFORMATION: SOB. COMPARISON: Chest x-ray dated 23 May 2019. Clement Fly TECHNIQUE: AP upright view of the chest. FINDINGS: There is mild cardiomegaly. .The mediastinum is not widened. There is slightly increased density throughout both lung fields. There are no effusions. . The pulmonary vascularity is increased. No suspicious osseous lesions are present. 1. Cardiomegaly and possible mild congestive heart failure. 2.. Slightly increased density throughout both lung fields. **This report has been created using voice recognition software. It may contain minor errors which are inherent in voice recognition technology. ** Final report electronically signed by DR Zayda Gil on 2/18/2022 9:01 PM      Diet:  Change to ADA , AHA diet    DVT prophylaxis: [x] Lovenox                                 [] SCDs                                 [] SQ Heparin                                 [] Encourage ambulation           [] Already on Anticoagulation       Code Status: Full Code      Active Hospital Problems    Diagnosis Date Noted    Acute respiratory failure with hypoxia ([facility] [address] [J96.01] 02/18/2022        Long d/w pt and son along with RN today , consult a dietician        Electronically signed by Inga Randle MD on 2/20/2022 at 1:33 PM

## 2022-02-20 NOTE — CONSULTS
[facility]    Patient - Al Rodriguez   MRN -  [id number]   Crystal Mckinley # - [de-identified]   - 1980      Date of Admission -  2022  8:17 PM  Date of evaluation -  2022  Room - [address] [place] [address] [address] [place], [place] Primary Care Physician - ROBBIE Oneil - DARRYL   Chief Complaint   Acute hypoxic respiratory failure  Active Hospital Problem List      Active Hospital Problems    Diagnosis Date Noted    Acute respiratory failure with hypoxia [facility]) [J96.01] 2022     HPI   Al Rodriguez is a 37 y.o. female with a PMH of hypertension, diabetes mellitus type 2, COPD and tobacco abuse presented with worsening shortness of breath and wheezing of 4 days duration. She denies any chest pain, fever, chills, cough. She states she does not have increased sputum production at home. She is not on any home O2. She is being treated outpatient for COPD with Symbicort and albuterol inhalers, states she has not seen pulmonology previously and has not had formal PFTs done. Patient smokes half pack per day she has been smoking for the last 20 years, she denies childhood asthma. Patient's oxygen saturation was  60% on room air. She was initiated on nonrebreather with improvement of SPO2 to 94%. She is eventually weaned down to 6 L NC. Patient received one-time dose of Rocephin and azithromycin for community-acquired pneumonia. CTA chest obtained demonstrated bilateral pneumonia significant reactive adenopathy. Covid negative  Past Medical History         Diagnosis Date    Anxiety     Depression     Diabetes mellitus ([address]     Hyperlipidemia     Hypertension       Past Surgical History           Procedure Laterality Date    ABSCESS DRAINAGE       Diet    ADULT DIET;  Regular; 5 carb choices (75 gm/meal)  Allergies    Codeine  Social History     Social History     Socioeconomic History    Marital status: Single     Spouse name: Not on file    Number of children: Not on file    Years of education: Not on file    Highest education level: Not on file   Occupational History    Not on file   Tobacco Use    Smoking status: Current Every Day Smoker     Packs/day: 1.00     Years: 15.00     Pack years: 15.00     Types: Cigarettes    Smokeless tobacco: Never Used   Substance and Sexual Activity    Alcohol use: No    Drug use: No    Sexual activity: Yes     Partners: Male   Other Topics Concern    Not on file   Social History Narrative    Not on file     Social Determinants of Health     Financial Resource Strain:     Difficulty of Paying Living Expenses: Not on file   Food Insecurity:     Worried About Running Out of Food in the Last Year: Not on file    Luis Felipe of Food in the Last Year: Not on file   Transportation Needs:     Lack of Transportation (Medical): Not on file    Lack of Transportation (Non-Medical):  Not on file   Physical Activity:     Days of Exercise per Week: Not on file    Minutes of Exercise per Session: Not on file   Stress:     Feeling of Stress : Not on file   Social Connections:     Frequency of Communication with Friends and Family: Not on file    Frequency of Social Gatherings with Friends and Family: Not on file    Attends Hoahaoism Services: Not on file    Active Member of [address] [place] [address] [employer] or Organizations: Not on file    Attends Club or Organization Meetings: Not on file    Marital Status: Not on file   Intimate Partner Violence:     Fear of Current or Ex-Partner: Not on file    Emotionally Abused: Not on file    Physically Abused: Not on file    Sexually Abused: Not on file   Housing Stability:     Unable to Pay for Housing in the Last Year: Not on file    Number of [place] in the Last Year: Not on file    Unstable Housing in the Last Year: Not on file     Family History          Problem Relation Age of Onset    High Blood Pressure Mother     Heart Disease Mother     High Cholesterol Mother     Depression Mother     Stroke Mother     Diabetes Maternal Aunt     High Blood Pressure Maternal Aunt     High Cholesterol Maternal Aunt      Sleep History     No history but high suspicion of JACIEL per history   ROS    General/Constitutional: No recent loss of weight or appetite changes. No fever or chills. HENT: Negative. Eyes: Negative. Upper respiratory tract: No nasal stuffiness or post nasal drip. Lower respiratory tract/ lungs: Redness of breath, dry cough, wheezing  Cardiovascular: No palpitations, chest pain or edema. Gastrointestinal: No nausea or vomiting. Neurological: No focal neurological weakness. Extremities: No tenderness. Musculoskeletal: no complaints  Genitourinary: No complaints. Hematological: Negative. Denies easy buising  Skin: No itching.   Meds    Current Medications    amitriptyline  10 mg Oral Nightly    aspirin  81 mg Oral Daily    atorvastatin  40 mg Oral Nightly    dilTIAZem  30 mg Oral TID    gabapentin  800 mg Oral TID    OLANZapine  10 mg Oral Nightly    budesonide-formoterol  2 puff Inhalation BID    ipratropium-albuterol  1 ampule Inhalation Q4H WA    azithromycin  500 mg IntraVENous Q24H    sodium chloride flush  10 mL IntraVENous 2 times per day    pantoprazole  40 mg Oral QAM AC    predniSONE  40 mg Oral Daily    enoxaparin  60 mg SubCUTAneous Q12H    insulin glargine  60 Units SubCUTAneous Nightly    insulin lispro  0-18 Units SubCUTAneous TID WC    insulin lispro  0-9 Units SubCUTAneous Nightly    cefTRIAXone (ROCEPHIN) IV  2,000 mg IntraVENous Q24H    lisinopril  20 mg Oral BID    metoprolol  50 mg Oral BID    nicotine  1 patch TransDERmal Daily     sodium chloride flush, sodium chloride, ondansetron **OR** ondansetron, polyethylene glycol, acetaminophen **OR** acetaminophen, glucose, glucagon (rDNA), dextrose, dextrose bolus (hypoglycemia) **OR** dextrose bolus (hypoglycemia)  IV Drips/Infusions   sodium chloride      dextrose      sodium chloride 125 mL/hr at 02/20/22 1121     Vitals    Vitals    height is 5' (1.524 m) and weight is 271 lb (122.9 kg). Her oral temperature is 98 °F (36.7 °C). Her blood pressure is 123/71 and her pulse is 89. Her respiration is 18 and oxygen saturation is 90%. O2 Flow Rate (L/min): 6 L/min  I/O    Intake/Output Summary (Last 24 hours) at 2/20/2022 1715  Last data filed at 2/20/2022 1603  Gross per 24 hour   Intake 2152 ml   Output --   Net 2152 ml     Patient Vitals for the past 96 hrs (Last 3 readings):   Weight   02/19/22 0600 271 lb (122.9 kg)   02/19/22 0527 271 lb 2.7 oz (123 kg)     Exam   Constitutional: Patient appears obese, in mild respiratory distress, wheezing  Head: Normocephalic and atraumatic. Mouth/Throat: Oropharynx is clear and moist.  No oral thrush. Eyes: Conjunctivae are normal. Pupils are equal, round, and reactive to light. No scleral icterus. Neck: Neck supple. No JVD or tracheal deviation present. Cardiovascular: Regular rate, regular rhythm, S1 and S2 with no murmur. No peripheral edema  Pulmonary/Chest: Diminished breath sounds bilaterally with diffuse wheezing  Abdominal: Soft. Bowel sounds audible. No distension or tenderness to palp  Musculoskeletal: Moves all extremities  Lymphadenopathy:  No cervical adenopathy. Neurological: Patient is alert and oriented to person, place, and time. Skin: Skin is warm and dry.   Labs   ABG  Lab Results   Component Value Date    PH 7.37 02/19/2022    PO2 54 02/19/2022    PCO2 54 02/19/2022    HCO3 31 02/19/2022    O2SAT 86 02/19/2022     Lab Results   Component Value Date    IFIO2 5 02/19/2022     CBC  Recent Labs     02/18/22 2025 02/19/22  0414 02/20/22  1029   WBC 17.7* 18.7* 17.4*   RBC 5.81* 6.05* 5.57*   HGB 17.8* 18.4* 17.0*   HCT 55.4* 58.1* 57.4*   MCV 95.4 96.0 103.1*   MCH 30.6 30.4 30.5   MCHC 32.1* 31.7* 29.6*    273 300   MPV 10.2 10.2 10.2      BMP  Recent Labs     02/18/22 2025 02/19/22 0414 02/20/22  1029   NA 135 135 137   K 4.2 5.0 5.0   CL 95* 96* 101   CO2 29 29 27   BUN 23* 20 31*   CREATININE 1.0 0.7 1.4*   GLUCOSE 247* 407* 232*   CALCIUM 9.0 9.0 7.9*     LFT  Recent Labs     02/18/22 2025 02/20/22  1029   AST 33 33   ALT 42 54   BILITOT 0.2* 0.2*   ALKPHOS 160* 151*     TROP  Lab Results   Component Value Date    TROPONINT < 0.010 02/18/2022    TROPONINT < 0.010 08/08/2018    TROPONINT < 0.010 07/11/2017     BNP  Lab Results   Component Value Date    PROBNP 2561.0 02/18/2022    PROBNP 10.2 07/11/2017     D-Dimer  Lab Results   Component Value Date    DDIMER 377.00 08/08/2018     Lactic Acid  Recent Labs     02/19/22  1602 02/19/22  2121 02/20/22  1029   LACTA 3.4* 2.3* 1.2     INR  Recent Labs     02/18/22 2025   INR 0.95     PTT  Recent Labs     02/18/22 2025   APTT 28.7     Glucose  Recent Labs     02/20/22  0703 02/20/22  1054 02/20/22  1620   POCGLU 215* 196* 303*     UA No results for input(s): [place]neta Presto, COLORU, CLARITYU, MUCUS, PROTEINU, BLOODU, RBCUA, WBCUA, BACTERIA, NITRU, GLUCOSEU, BILIRUBINUR, UROBILINOGEN, KETUA, LABCAST, LABCASTTY, AMORPHOS in the last 72 hours. Invalid input(s): CRYSTALS. PFTs   No records  Echo     Summary   Technically difficult examination. Left ventricular size and systolic function is normal. Ejection fraction   was estimated at 55-60%. LV wall thickness is within normal limits and   there are no obvious wall motion abnormalities. The right ventricular size appears normal with normal systolic function   and wall thickness.   Cultures    Procalcitonin  Lab Results   Component Value Date    PROCAL 0.12 02/18/2022    PROCAL 0.05 08/08/2018    PROCAL 0.03 07/11/2017     Radiology    CXR    CT Scans    (See actual reports for details)    Assessment   Acute on chronic respiratory failure with hypoxia  COPD exacerbation  Right middle lobe and lingular infiltrate/atelectasis  Tobacco abuse  Obesity with high suspicion for obstructive sleep apnea  Hypertension  Diabetes mellitus type 2  Recommendations     Continue antibiotics and steroids  Wean oxygen as tolerated, patient will most likely require home oxygen  Use DuoNeb as needed  Resume home Symbicort   Patient will need outpatient follow-up for PFTs and polysomnogram  Check IgE level  Smoking cessation was advised    Thank you for the consult and allowing us to participate in the care of your patient. Case discussed with nurse and patient/family. Questions and concerns addressed. Meds and Orders reviewed.     Electronically signed by     Nicolasa Bernstein MD on 2/20/2022 at 5:15 PM

## 2022-02-20 NOTE — RT PROTOCOL NOTE
RT Inhaler-Nebulizer Bronchodilator Protocol Note    There is a bronchodilator order in the chart from a provider indicating to follow the RT Bronchodilator Protocol and there is an Initiate RT Inhaler-Nebulizer Bronchodilator Protocol order as well (see protocol at bottom of note). CXR Findings:  XR CHEST PORTABLE    Result Date: 2/18/2022  1. Cardiomegaly and possible mild congestive heart failure. 2.. Slightly increased density throughout both lung fields. **This report has been created using voice recognition software. It may contain minor errors which are inherent in voice recognition technology. ** Final report electronically signed by DR Jonah Lugo on 2/18/2022 9:01 PM      The findings from the last RT Protocol Assessment were as follows:   History Pulmonary Disease: Chronic pulmonary disease (per DR Yanick Baron note on 2/18/22)  Respiratory Pattern: Mild dyspnea at rest, irregular pattern, or RR 21-25 bpm  Breath Sounds: Inspiratory and expiratory or bilateral wheezing and/or rhonchi  Cough: Strong, productive  Indication for Bronchodilator Therapy: Decreased or absent breath sounds,Wheezing associated with pulm disorder,On home bronchodilators  Bronchodilator Assessment Score: 13    NO changes needed. Scores for qid and prn and txs are ordered q4wa so pt has order if needs tx t/o night. Aerosolized bronchodilator medication orders have been revised according to the RT Inhaler-Nebulizer Bronchodilator Protocol below. Respiratory Therapist to perform RT Therapy Protocol Assessment initially then follow the protocol. Repeat RT Therapy Protocol Assessment PRN for score 0-3 or on second treatment, BID, and PRN for scores above 3. No Indications - adjust the frequency to every 6 hours PRN wheezing or bronchospasm, if no treatments needed after 48 hours then discontinue using Per Protocol order mode.      If indication present, adjust the RT bronchodilator orders based on the Bronchodilator Assessment Score as indicated below. Use Inhaler orders unless patient has one or more of the following: on home nebulizer, not able to hold breath for 10 seconds, is not alert and oriented, cannot activate and use MDI correctly, or respiratory rate 25 breaths per minute or more, then use the equivalent nebulizer order(s) with same Frequency and PRN reasons based on the score. If a patient is on this medication at home then do not decrease Frequency below that used at home. 0-3 - enter or revise RT bronchodilator order(s) to equivalent RT Bronchodilator order with Frequency of every 4 hours PRN for wheezing or increased work of breathing using Per Protocol order mode. 4-6 - enter or revise RT Bronchodilator order(s) to two equivalent RT bronchodilator orders with one order with BID Frequency and one order with Frequency of every 4 hours PRN wheezing or increased work of breathing using Per Protocol order mode. 7-10 - enter or revise RT Bronchodilator order(s) to two equivalent RT bronchodilator orders with one order with TID Frequency and one order with Frequency of every 4 hours PRN wheezing or increased work of breathing using Per Protocol order mode. 11-13 - enter or revise RT Bronchodilator order(s) to one equivalent RT bronchodilator order with QID Frequency and an Albuterol order with Frequency of every 4 hours PRN wheezing or increased work of breathing using Per Protocol order mode. Greater than 13 - enter or revise RT Bronchodilator order(s) to one equivalent RT bronchodilator order with every 4 hours Frequency and an Albuterol order with Frequency of every 2 hours PRN wheezing or increased work of breathing using Per Protocol order mode. RT to enter RT Home Evaluation for COPD & MDI Assessment order using Per Protocol order mode.     Electronically signed by Angelica Cifuentes RCP on 2/20/2022 at 8:06 AM

## 2022-02-20 NOTE — PLAN OF CARE
Problem: Cardiovascular  Goal: No DVT, peripheral vascular complications  Outcome: Ongoing  Note: Pt getting LOvenox as ordered. Problem: Respiratory  Goal: O2 Sat > 90%  Outcome: Ongoing  Note: Patient on 6L nasal cannula. Encourage use of incentive spirometer. Problem: GI  Goal: No bowel complications  Outcome: Ongoing  Note: Bowel sounds active. Problem:   Goal: Adequate urinary output  Outcome: Ongoing  Note: Pt having adequate urinary output. Problem: Discharge Planning:  Goal: Discharged to appropriate level of care  Description: Discharged to appropriate level of care  Outcome: Ongoing  Note: Discharge planning in progress. Patient plans to go home at discharge, no date at this time. Problem: Safety:  Goal: Free from accidental physical injury  Description: Free from accidental physical injury  Outcome: Ongoing  Note: Pt free of physical injury. Problem: Daily Care:  Goal: Daily care needs are met  Description: Daily care needs are met  Outcome: Ongoing  Note: Patient able to complete ADL's with minimal assist.     Care plan reviewed with patient. Patient verbalize understanding of the plan of care and contribute to goal setting.

## 2022-02-21 LAB
GLUCOSE BLD-MCNC: 125 MG/DL (ref 70–108)
GLUCOSE BLD-MCNC: 159 MG/DL (ref 70–108)
GLUCOSE BLD-MCNC: 219 MG/DL (ref 70–108)
GLUCOSE BLD-MCNC: 331 MG/DL (ref 70–108)
IGE: 7 IU/ML

## 2022-02-21 PROCEDURE — 99232 SBSQ HOSP IP/OBS MODERATE 35: CPT | Performed by: INTERNAL MEDICINE

## 2022-02-21 PROCEDURE — 6370000000 HC RX 637 (ALT 250 FOR IP): Performed by: INTERNAL MEDICINE

## 2022-02-21 PROCEDURE — 6370000000 HC RX 637 (ALT 250 FOR IP)

## 2022-02-21 PROCEDURE — 97530 THERAPEUTIC ACTIVITIES: CPT

## 2022-02-21 PROCEDURE — 97166 OT EVAL MOD COMPLEX 45 MIN: CPT

## 2022-02-21 PROCEDURE — 2580000003 HC RX 258: Performed by: INTERNAL MEDICINE

## 2022-02-21 PROCEDURE — 82948 REAGENT STRIP/BLOOD GLUCOSE: CPT

## 2022-02-21 PROCEDURE — 6370000000 HC RX 637 (ALT 250 FOR IP): Performed by: NURSE PRACTITIONER

## 2022-02-21 PROCEDURE — 2580000003 HC RX 258

## 2022-02-21 PROCEDURE — 99233 SBSQ HOSP IP/OBS HIGH 50: CPT | Performed by: INTERNAL MEDICINE

## 2022-02-21 PROCEDURE — 1200000003 HC TELEMETRY R&B

## 2022-02-21 PROCEDURE — 2700000000 HC OXYGEN THERAPY PER DAY

## 2022-02-21 PROCEDURE — 94640 AIRWAY INHALATION TREATMENT: CPT

## 2022-02-21 PROCEDURE — 94760 N-INVAS EAR/PLS OXIMETRY 1: CPT

## 2022-02-21 PROCEDURE — 6360000002 HC RX W HCPCS: Performed by: NURSE PRACTITIONER

## 2022-02-21 PROCEDURE — 6360000002 HC RX W HCPCS: Performed by: INTERNAL MEDICINE

## 2022-02-21 PROCEDURE — 6370000000 HC RX 637 (ALT 250 FOR IP): Performed by: HOSPITALIST

## 2022-02-21 PROCEDURE — APPSS30 APP SPLIT SHARED TIME 16-30 MINUTES: Performed by: NURSE PRACTITIONER

## 2022-02-21 PROCEDURE — 82785 ASSAY OF IGE: CPT

## 2022-02-21 PROCEDURE — 6360000002 HC RX W HCPCS

## 2022-02-21 PROCEDURE — 1200000000 HC SEMI PRIVATE

## 2022-02-21 PROCEDURE — 36415 COLL VENOUS BLD VENIPUNCTURE: CPT

## 2022-02-21 RX ORDER — BUDESONIDE 0.5 MG/2ML
0.5 INHALANT ORAL 2 TIMES DAILY
Status: DISCONTINUED | OUTPATIENT
Start: 2022-02-21 | End: 2022-02-25 | Stop reason: HOSPADM

## 2022-02-21 RX ORDER — GUAIFENESIN 600 MG/1
600 TABLET, EXTENDED RELEASE ORAL 2 TIMES DAILY
Status: DISCONTINUED | OUTPATIENT
Start: 2022-02-21 | End: 2022-02-25 | Stop reason: HOSPADM

## 2022-02-21 RX ORDER — ALBUTEROL SULFATE 2.5 MG/3ML
2.5 SOLUTION RESPIRATORY (INHALATION) EVERY 4 HOURS PRN
Status: DISCONTINUED | OUTPATIENT
Start: 2022-02-21 | End: 2022-02-23

## 2022-02-21 RX ADMIN — SODIUM CHLORIDE, PRESERVATIVE FREE 10 ML: 5 INJECTION INTRAVENOUS at 09:56

## 2022-02-21 RX ADMIN — SODIUM CHLORIDE: 9 INJECTION, SOLUTION INTRAVENOUS at 05:03

## 2022-02-21 RX ADMIN — DILTIAZEM HYDROCHLORIDE 30 MG: 30 TABLET, FILM COATED ORAL at 14:47

## 2022-02-21 RX ADMIN — LISINOPRIL 20 MG: 20 TABLET ORAL at 09:56

## 2022-02-21 RX ADMIN — BUDESONIDE AND FORMOTEROL FUMARATE DIHYDRATE 2 PUFF: 160; 4.5 AEROSOL RESPIRATORY (INHALATION) at 07:43

## 2022-02-21 RX ADMIN — SODIUM CHLORIDE: 9 INJECTION, SOLUTION INTRAVENOUS at 13:25

## 2022-02-21 RX ADMIN — DILTIAZEM HYDROCHLORIDE 30 MG: 30 TABLET, FILM COATED ORAL at 20:14

## 2022-02-21 RX ADMIN — CEFTRIAXONE SODIUM 2000 MG: 2 INJECTION, POWDER, FOR SOLUTION INTRAMUSCULAR; INTRAVENOUS at 20:20

## 2022-02-21 RX ADMIN — IPRATROPIUM BROMIDE AND ALBUTEROL SULFATE 1 AMPULE: .5; 3 SOLUTION RESPIRATORY (INHALATION) at 07:43

## 2022-02-21 RX ADMIN — OLANZAPINE 10 MG: 10 TABLET, FILM COATED ORAL at 20:14

## 2022-02-21 RX ADMIN — GABAPENTIN 800 MG: 400 CAPSULE ORAL at 09:56

## 2022-02-21 RX ADMIN — IPRATROPIUM BROMIDE AND ALBUTEROL SULFATE 1 AMPULE: .5; 3 SOLUTION RESPIRATORY (INHALATION) at 15:26

## 2022-02-21 RX ADMIN — INSULIN GLARGINE 60 UNITS: 100 INJECTION, SOLUTION SUBCUTANEOUS at 21:29

## 2022-02-21 RX ADMIN — Medication 9 MG: at 20:14

## 2022-02-21 RX ADMIN — AZITHROMYCIN DIHYDRATE 500 MG: 500 INJECTION, POWDER, LYOPHILIZED, FOR SOLUTION INTRAVENOUS at 20:21

## 2022-02-21 RX ADMIN — GABAPENTIN 800 MG: 400 CAPSULE ORAL at 20:14

## 2022-02-21 RX ADMIN — ENOXAPARIN SODIUM 60 MG: 100 INJECTION SUBCUTANEOUS at 11:57

## 2022-02-21 RX ADMIN — ATORVASTATIN CALCIUM 40 MG: 40 TABLET, FILM COATED ORAL at 20:14

## 2022-02-21 RX ADMIN — ASPIRIN 81 MG CHEWABLE TABLET 81 MG: 81 TABLET CHEWABLE at 09:56

## 2022-02-21 RX ADMIN — IPRATROPIUM BROMIDE AND ALBUTEROL SULFATE 1 AMPULE: .5; 3 SOLUTION RESPIRATORY (INHALATION) at 20:39

## 2022-02-21 RX ADMIN — GABAPENTIN 800 MG: 400 CAPSULE ORAL at 14:46

## 2022-02-21 RX ADMIN — DILTIAZEM HYDROCHLORIDE 30 MG: 30 TABLET, FILM COATED ORAL at 09:56

## 2022-02-21 RX ADMIN — METOPROLOL TARTRATE 50 MG: 50 TABLET, FILM COATED ORAL at 09:56

## 2022-02-21 RX ADMIN — AMITRIPTYLINE HYDROCHLORIDE 10 MG: 10 TABLET, FILM COATED ORAL at 20:14

## 2022-02-21 RX ADMIN — PREDNISONE 40 MG: 20 TABLET ORAL at 09:56

## 2022-02-21 RX ADMIN — PANTOPRAZOLE SODIUM 40 MG: 40 TABLET, DELAYED RELEASE ORAL at 05:01

## 2022-02-21 RX ADMIN — LISINOPRIL 20 MG: 20 TABLET ORAL at 20:15

## 2022-02-21 RX ADMIN — IPRATROPIUM BROMIDE AND ALBUTEROL SULFATE 1 AMPULE: .5; 3 SOLUTION RESPIRATORY (INHALATION) at 11:47

## 2022-02-21 RX ADMIN — METOPROLOL TARTRATE 50 MG: 50 TABLET, FILM COATED ORAL at 20:14

## 2022-02-21 RX ADMIN — BUDESONIDE 500 MCG: 0.5 INHALANT RESPIRATORY (INHALATION) at 17:14

## 2022-02-21 RX ADMIN — GUAIFENESIN 600 MG: 600 TABLET, EXTENDED RELEASE ORAL at 20:19

## 2022-02-21 NOTE — PLAN OF CARE
Problem: Cardiovascular  Goal: No DVT, peripheral vascular complications  7/20/6596 2207 by Christopher Steen RN  Outcome: Ongoing  2/20/2022 0842 by Marylou García RN  Outcome: Ongoing  Note: Pt getting LOvenox as ordered. Problem: Respiratory  Goal: O2 Sat > 90%  2/20/2022 2207 by Christopher Steen RN  Outcome: Ongoing  2/20/2022 0842 by Marylou García RN  Outcome: Ongoing  Note: Patient on 6L nasal cannula. Encourage use of incentive spirometer. Problem: GI  Goal: No bowel complications  7/61/8267 2207 by Christopher Steen RN  Outcome: Ongoing  2/20/2022 0842 by Marylou García RN  Outcome: Ongoing  Note: Bowel sounds active. Problem:   Goal: Adequate urinary output  2/20/2022 2207 by Christopher Steen RN  Outcome: Ongoing  2/20/2022 0842 by Marylou García RN  Outcome: Ongoing  Note: Pt having adequate urinary output. Problem: Discharge Planning:  Goal: Discharged to appropriate level of care  Description: Discharged to appropriate level of care  2/20/2022 2207 by Christopher Steen RN  Outcome: Ongoing  2/20/2022 0842 by Marylou García RN  Outcome: Ongoing  Note: Discharge planning in progress. Patient plans to go home at discharge, no date at this time. Problem: Safety:  Goal: Free from accidental physical injury  Description: Free from accidental physical injury  2/20/2022 2207 by Christopher Steen RN  Outcome: Ongoing  2/20/2022 0842 by Marylou García RN  Outcome: Ongoing  Note: Pt free of physical injury.       Problem: Daily Care:  Goal: Daily care needs are met  Description: Daily care needs are met  2/20/2022 2207 by Christopher Steen RN  Outcome: Ongoing  2/20/2022 0842 by Marylou García RN  Outcome: Ongoing  Note: Patient able to complete ADL's with minimal assist.

## 2022-02-21 NOTE — PROGRESS NOTES
Nutrition Education  Received c/s -Diet Education- pt with obesity. DM-2    · Verbally reviewed information with Patient  · Educated on heart healthy, CHO controlled diet. Encouraged weight reduction. · Written educational materials provided. · Contact name and number provided. · Refer to Patient Education activity for more details.     Electronically signed by Nikki Kelsey RD, LD on 2/21/22 at 11:08 AM EST    Contact: [phone]

## 2022-02-21 NOTE — CARE COORDINATION
2/21/22, 11:10 AM EST  DISCHARGE PLANNING EVALUATION:    Matthew Araiza       Admitted: 2/18/2022/ 2017   Hospital day: 3   Location: -27/027-A Reason for admit: Acute respiratory failure with hypoxia ([facility] [J96.01]  Pneumonia due to infectious organism, unspecified laterality, unspecified part of lung [J18.9]   PMH:  has a past medical history of Anxiety, Depression, Diabetes mellitus ([facility] Hyperlipidemia, and Hypertension. Procedure:   2/18 CTA chest: Impression: Bilateral pneumonia with significant reactive adenopathy. Follow-up recommended to ensure complete clearing of via chest radiography and subsequent CT examination across the next 6-8 weeks. 2/18 CXR: 1. Cardiomegaly and possible mild congestive heart failure. 2.. Slightly increased density throughout both lung fields. Barriers to Discharge:  Hospitalist and pulm following. Requiring 6L NC. IVF. IV zithromax. IV rocephin. Prednisone. Nebs. Inhaler. PCP: ROBBIE Lovell CNP  Readmission Risk Score: 13.7 ( )%    Patient Goals/Plan/Treatment Preferences: Spoke with Anayeli Ferris, she plans to return home independently. She reports they are planning OP sleep study. Following for home O2 eval closer to discharge. Does not anticipate further needs. Transportation/Food Security/Housekeeping Addressed:  No issues identified.

## 2022-02-21 NOTE — PLAN OF CARE
Problem: RESPIRATORY  Goal: Clear lung sounds  Description: Clear lung sounds  Outcome: Ongoing  Note: Duoneb and Symbicort ongoing as ordered to improve lung sounds and decrease shortness of breath.

## 2022-02-21 NOTE — PROGRESS NOTES
[place] [facility]    Patient - Jenniffer Mahoney   MRN -  [id number]   [facility] # - [de-identified]   - 1980      Date of Admission -  2022  8:17 PM  Date of evaluation -  2022  Room - [facility] Primary Care Physician - ROBBIE Schneider - CNP   Reason for consult   AECOPD with acute hypoxic respiratory failure  Active Hospital Problem List      Active Hospital Problems    Diagnosis Date Noted    Pneumonia due to infectious organism [J18.9]     JACIEL and COPD overlap syndrome ([facility] [G47.33, J44.9]     Acute respiratory failure with hypoxia ([facility] [J96.01] 2022     HPI   Jenniffer Mahoney is a 37 y.o. female with a PMH of hypertension, diabetes mellitus type 2, COPD and tobacco abuse presented with worsening shortness of breath and wheezing of 4 days duration. She denies any chest pain, fever, chills, cough. She states she does not have increased sputum production at home. She is not on any home O2. She is being treated outpatient for COPD with Symbicort and albuterol inhalers, states she has not seen pulmonology previously and has not had formal PFTs done. Patient smokes half pack per day she has been smoking for the last 20 years, she denies childhood asthma. Patient's oxygen saturation was  60% on room air. She was initiated on nonrebreather with improvement of SPO2 to 94%. She is eventually weaned down to 6 L NC. Patient received one-time dose of Rocephin and azithromycin for community-acquired pneumonia. CTA chest obtained demonstrated bilateral pneumonia significant reactive adenopathy.  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     Past 24 hrs   -On 6 LPM via NC   -reports SOB improved since admission   -Productive cough  All other Emotionally Abused: Not on file    Physically Abused: Not on file    Sexually Abused: Not on file   Housing Stability:     Unable to Pay for Housing in the Last Year: Not on file    Number of Places Lived in the Last Year: Not on file    Unstable Housing in the Last Year: Not on file     Family History          Problem Relation Age of Onset    High Blood Pressure Mother     Heart Disease Mother     High Cholesterol Mother     Depression Mother     Stroke Mother     Diabetes Maternal Aunt     High Blood Pressure Maternal Aunt     High Cholesterol Maternal Aunt      Sleep History     No history but high suspicion of JACIEL per history   Meds    Current Medications    amitriptyline  10 mg Oral Nightly    aspirin  81 mg Oral Daily    atorvastatin  40 mg Oral Nightly    dilTIAZem  30 mg Oral TID    gabapentin  800 mg Oral TID    OLANZapine  10 mg Oral Nightly    budesonide-formoterol  2 puff Inhalation BID    ipratropium-albuterol  1 ampule Inhalation Q4H WA    azithromycin  500 mg IntraVENous Q24H    sodium chloride flush  10 mL IntraVENous 2 times per day    pantoprazole  40 mg Oral QAM AC    predniSONE  40 mg Oral Daily    enoxaparin  60 mg SubCUTAneous Q12H    insulin glargine  60 Units SubCUTAneous Nightly    insulin lispro  0-18 Units SubCUTAneous TID WC    insulin lispro  0-9 Units SubCUTAneous Nightly    cefTRIAXone (ROCEPHIN) IV  2,000 mg IntraVENous Q24H    lisinopril  20 mg Oral BID    metoprolol  50 mg Oral BID    nicotine  1 patch TransDERmal Daily     melatonin, sodium chloride flush, sodium chloride, ondansetron **OR** ondansetron, polyethylene glycol, acetaminophen **OR** acetaminophen, glucose, glucagon (rDNA), dextrose, dextrose bolus (hypoglycemia) **OR** dextrose bolus (hypoglycemia)  IV Drips/Infusions   sodium chloride      dextrose      sodium chloride 125 mL/hr at 02/21/22 1325     Vitals    Vitals    height is 5' (1.524 m) and weight is 290 lb 5.5 oz (131.7 kg). Her oral temperature is 97.8 °F (36.6 °C). Her blood pressure is 136/78 and her pulse is 102. Her respiration is 22 and oxygen saturation is 91%. O2 Flow Rate (L/min): 6 L/min  I/O    Intake/Output Summary (Last 24 hours) at 2/21/2022 1600  Last data filed at 2/21/2022 1255  Gross per 24 hour   Intake 3112 ml   Output --   Net 3112 ml     Patient Vitals for the past 96 hrs (Last 3 readings):   Weight   02/21/22 0600 290 lb 5.5 oz (131.7 kg)   02/19/22 0600 271 lb (122.9 kg)   02/19/22 0527 271 lb 2.7 oz (123 kg)     Exam   Physical Exam   Constitutional: No distress on O2 per NC. Patient appears obese BMI 56  Head: Normocephalic and atraumatic. Mouth/Throat: Oropharynx is clear and moist.  No oral thrush. Eyes: Conjunctivae are normal. Pupils are equal, round. No scleral icterus. Neck: Neck supple. No tracheal deviation present. Cardiovascular: S1 and S2 with no murmur. No peripheral edema  Pulmonary/Chest: Normal effort with bilateral air entry, diminished breath sounds bilaterally with faint expiratory wheeze. No stridor. No respiratory distress. Patient exhibits no tenderness. Abdominal: Soft. Bowel sounds audible. No tenderness to palp. Musculoskeletal: Moves all extremities  Neurological: Patient is alert and oriented to person, place, and time.      Labs   ABG  Lab Results   Component Value Date    PH 7.37 02/19/2022    PO2 54 02/19/2022    PCO2 54 02/19/2022    HCO3 31 02/19/2022    O2SAT 86 02/19/2022     Lab Results   Component Value Date    IFIO2 5 02/19/2022     CBC  Recent Labs     02/18/22  2025 02/19/22  0414 02/20/22  1029   WBC 17.7* 18.7* 17.4*   RBC 5.81* 6.05* 5.57*   HGB 17.8* 18.4* 17.0*   HCT 55.4* 58.1* 57.4*   MCV 95.4 96.0 103.1*   MCH 30.6 30.4 30.5   MCHC 32.1* 31.7* 29.6*    273 300   MPV 10.2 10.2 10.2      BMP  Recent Labs     02/18/22 2025 02/19/22  0414 02/20/22  1029    135 137   K 4.2 5.0 5.0   CL 95* 96* 101   CO2 29 29 27   BUN 23* 20 31*   CREATININE 1.0 0.7 1.4*   GLUCOSE 247* 407* 232*   CALCIUM 9.0 9.0 7.9*     LFT  Recent Labs     02/18/22 2025 02/20/22  1029   AST 33 33   ALT 42 54   BILITOT 0.2* 0.2*   ALKPHOS 160* 151*     TROP  Lab Results   Component Value Date    TROPONINT < 0.010 02/18/2022    TROPONINT < 0.010 08/08/2018    TROPONINT < 0.010 07/11/2017     BNP  Lab Results   Component Value Date    PROBNP 2561.0 02/18/2022    PROBNP 10.2 07/11/2017     D-Dimer  Lab Results   Component Value Date    DDIMER 377.00 08/08/2018     Lactic Acid  Recent Labs     02/19/22  1602 02/19/22  2121 02/20/22  1029   LACTA 3.4* 2.3* 1.2     INR  Recent Labs     02/18/22 2025   INR 0.95     PTT  Recent Labs     02/18/22 2025   APTT 28.7     Glucose  Recent Labs     02/20/22  2118 02/21/22  0629 02/21/22  1053   POCGLU 304* 125* 159*     UA No results for input(s): Raymondo Porteous, COLORU, CLARITYU, MUCUS, PROTEINU, BLOODU, RBCUA, WBCUA, BACTERIA, NITRU, GLUCOSEU, BILIRUBINUR, UROBILINOGEN, KETUA, LABCAST, LABCASTTY, AMORPHOS in the last 72 hours. Invalid input(s): CRYSTALS. PFTs   No records  Echo     Summary   Technically difficult examination. Left ventricular size and systolic function is normal. Ejection fraction   was estimated at 55-60%. LV wall thickness is within normal limits and   there are no obvious wall motion abnormalities. The right ventricular size appears normal with normal systolic function   and wall thickness. Cultures    Procalcitonin  Lab Results   Component Value Date    PROCAL 0.12 02/18/2022    PROCAL 0.05 08/08/2018    PROCAL 0.03 07/11/2017     Radiology    CXR    XR CHEST PORTABLE   2/18/2022   FINDINGS:  There is mild cardiomegaly. .The mediastinum is not widened. There is slightly increased density throughout both lung fields. There are no effusions. . The pulmonary vascularity is increased. No suspicious osseous lesions are present. Impression:  1. Cardiomegaly and possible mild congestive heart failure.    2.. Slightly increased density throughout both lung fields. CT Scans  Findings: Uniform density through the thyroid gland. Lymphoid hyperplasia in the superior mediastinum, hilar regions and infracarinal tissues. Adenopathy may be reactive to scattered atelectasis and infiltrate through the lungs bilaterally. There is groundglass opacification centrally in the upper lobes with predominantly atelectasis and consolidative infiltrate scattered through the lower lobes and right middle lobe. Minimal airspace opacification is noted to the lingula. There is no evidence of pulmonary embolism or aortic dissection. Small hernia is evident with moderate volume of air seen into the distal esophagus suggesting underlying gastroesophageal reflux disease, correlate clinically. The included portions of the upper abdomen are notable for hepatomegaly. Axillary lymph nodes are benign appearing. The patient's breast tissues are that of scattered fibroglandular nature. For healthcare maintenance purposes she should be reminded that the [employer] of Rad[employer] recommends screening mammography commencing at the age of 37. Impression:  Bilateral pneumonia with significant reactive adenopathy. Follow-up recommended to ensure complete clearing of via chest radiography and subsequent CT examination across the next 6-8 weeks.          (See actual reports for details)    Assessment   -Acute respiratory failure with hypoxia 2/2 COPD exacerbation  -COPD exacerbation  -Right middle lobe and lingular infiltrate/atelectasis  -Tobacco abuse  -Obesity with high suspicion for obstructive sleep apnea  -Hypertension  -Diabetes mellitus type 2  Recommendations   -Monitor SpO2 wean supplemental O2 to maintain SpO2 >90%  -Agree with empiric ATB's Azithromycin and Rocephin  -Prednisone 40 mg PO Daily x 5 days   -NRT 14 mg per Day advised to continue efforts of tobacco cessation  -Duonebs Q 4 hrs WA   -Will stop symbicort and add Pulmicort 500 mcg via neb BID to eliminate duplicate beta agonist  -Guaifenesin 600 mg PO BID   -Pulmonary hygiene with IS and acapella     Case discussed with nurse and patient/family. Questions and concerns addressed. Meds and Orders reviewed. Electronically signed by     ROBBIE Cox - CNP on 2/21/2022 at 4:00 PM    Vitals:    02/21/22 1638   BP: 134/80   Pulse: 91   Resp: 20   Temp: 98.4 °F (36.9 °C)   SpO2: 91%     As above  Feels better, less SOB  Afebrile, on NC  Chest expiratory delay. Wheezes  Complete, steroids, continue bronchodilators  Smoking cessation. Home soon    Patient seen and examined independently by me. Above discussed and I agree with  CNP note Also see my additional comments. Labs, cultures, and radiographs when available were reviewed. Changes were made in the orders as necessary. I discussed patient concerns with Beverly MIRELES and instructions were given. Respiratory care issues addressed. Please see our orders for the updated patient care plan.     Electronically signed by     Adry Vega MD on 2/21/2022 at 4:45 PM

## 2022-02-21 NOTE — PROGRESS NOTES
Hospitalist Progress Note  [facility]       Patient: Amilcar Gonzáles  Unit/Bed: 3Y-05/042-C  YOB: 1980  MRN: [id number]  Acct: [de-identified]   AdmittingDiagnosis: Acute respiratory failure with hypoxia ([address] [J96.01]  Pneumonia due to infectious organism, unspecified laterality, unspecified part of lung [J18.9]  Admit Date: 2/18/2022  Hospital Day: 3    Subjective:    Complains of mild dyspnea/tachypnea, requiring 6 L of oxygen by nasal cannula    Objective:   /73   Pulse 102   Temp 97.8 °F (36.6 °C) (Oral)   Resp 22   Ht 5' (1.524 m)   Wt 290 lb 5.5 oz (131.7 kg)   SpO2 91%   BMI 56.70 kg/m²     Intake/Output Summary (Last 24 hours) at 2/21/2022 1412  Last data filed at 2/21/2022 1255  Gross per 24 hour   Intake 3112 ml   Output --   Net 3112 ml     General appearance: A&O x3, Not ill or toxic, in no apparent distress  HEENT:  JUNI  EOM intact. Neck: Supple, with full range of motion. No jugular venous distention. Trachea midline. Respiratory:   NL A/E bilat with no adventitious sounds   Cardiovascular:  normal S1/S2 with no murmurs/gallops  Abdomen: Soft, non-tender, non-distended, no rigidity or peritoneal signs  Musculoskeletal: NL symmetrical A/PROM bilat U/L extremities   Skin: No rashes. No edema  Neurologic:  CN II-XII intact. NL symmetrical reflexes. NL gait and stance. NL Cerebellar exam. Power 5/5 all muscle groups U/L extremities.  Toes downgoing  Capillary Refill: Brisk,< 3 seconds   Peripheral Pulses: +2 palpable, equal bilaterally    DVT Prophylaxis: Lovenox    Data:  CBC:   Lab Results   Component Value Date    WBC 17.4 02/20/2022    HGB 17.0 02/20/2022    HCT 57.4 02/20/2022    .1 02/20/2022     02/20/2022     BMP:   Lab Results   Component Value Date     02/20/2022    K 5.0 02/20/2022    K 5.0 02/19/2022     02/20/2022    CO2 27 02/20/2022    PHOS 2.8 07/11/2014    BUN 31 02/20/2022    CREATININE 1.4 02/20/2022    CALCIUM 7.9 02/20/2022     ABGs:   Lab Results   Component Value Date    PH 7.37 02/19/2022    PCO2 54 02/19/2022    PO2 54 02/19/2022    HCO3 31 02/19/2022    O2SAT 86 02/19/2022     Troponin:   Lab Results   Component Value Date    TROPONINI <0.006 07/14/2013    TROPONINI <0.006 04/19/2012      LFTs   Lab Results   Component Value Date    AST 33 02/20/2022    ALT 54 02/20/2022    BILITOT 0.2 02/20/2022    BILITOT NEGATIVE 12/17/2011    ALKPHOS 151 02/20/2022          Imaging   ECHO Complete 2D W Doppler W Color    Result Date: 2/19/2022  Transthoracic Echocardiography Report (TTE)  Demographics   Patient Name  Yobany Ovalle  Gender             Female                S   MR #          [id number]    Race                                             Ethnicity   Account #     [de-identified]    Room Number        4189   Accession     [id number]   Date of Study      02/19/2022  Number   Date of Birth 1980   Referring          [facility]                             Physician          Marsha Mcnulty MD   Age           37 year(s)   Sonographer        ALVINA Aponte, RDCS,                                                RDMS, RVT                              Interpreting       Efrain Pearl MD                             Physician  Procedure Type of Study   TTE procedure:ECHOCARDIOGRAM COMPLETE 2D W DOPPLER W COLOR. Procedure Date Date: 02/19/2022 Start: 09:31 AM Study Location: Bedside Technical Quality: Limited visualization due to body habitus. Indications:Acute respiratory failure. Additional Medical History:acute respiratory failure, abdominal pain, tachycardia, sepsis, diabetes, hypertension, morbid obesity, hyperlipidemia Patient Status: Routine Height: 60 inches Weight: 270.01 pounds BSA: 2.12 m^2 BMI: 52.73 kg/m^2 BP: 173/84 mmHg  Conclusions   Summary  Technically difficult examination. Left ventricular size and systolic function is normal. Ejection fraction  was estimated at 55-60%.  LV wall thickness is within normal limits and  there are no obvious wall motion abnormalities. The right ventricular size appears normal with normal systolic function  and wall thickness. Signature   ----------------------------------------------------------------  Electronically signed by Clint Elliott MD (Interpreting  physician) on 02/19/2022 at 01:11 PM  ----------------------------------------------------------------   Findings   Mitral Valve  The mitral valve structure is normal with normal leaflet separation. DOPPLER: The transmitral velocity was within the normal range with no  evidence for mitral stenosis. There was no evidence of mitral  regurgitation. Aortic Valve  The aortic valve appears trileaflet with normal thickness and leaflet  excursion. DOPPLER: Transaortic velocity was within the normal range with  no evidence of aortic stenosis. There was no evidence of aortic  regurgitation. Tricuspid Valve  The tricuspid valve structure is normal with normal leaflet separation. DOPPLER: There is no evidence of tricuspid stenosis. There was no evidence  of tricuspid regurgitation. Pulmonic Valve  The pulmonic valve was not well visualized . Left Atrium  Left atrial size is normal.   Left Ventricle  Left ventricular size and systolic function is normal. Ejection fraction  was estimated at 55-60%. LV wall thickness is within normal limits and  there are no obvious wall motion abnormalities. Right Atrium  Right atrial size was normal.   Right Ventricle  The right ventricular size appears normal with normal systolic function  and wall thickness. Pericardial Effusion  The pericardium appears normal with no evidence of a pericardial effusion. Pleural Effusion  No evidence of pleural effusion. Aorta / Great Vessels  -Aortic root dimension within normal limits. -IVC size is within normal limits with normal respiratory phasic changes.   M-Mode/2D Measurements & Calculations   LV Diastolic      LV Systolic Dimension: 2.7 cm    LA Dimension: 3.9 cmLA  Dimension: 3.7 cm LV Volume Diastolic: 81.6 ml     Area: 12.1 cm^2  LV FS:27 %        LV Volume Systolic: 27 ml  LV PW Diastolic:  LV EDV/LV EDV Index: 58.1 ml/27  1.3 cm            m^2LV ESV/LV ESV Index: 27 LP/87  Septum Diastolic: m^2                              RV Diastolic Dimension:  1.3 cm            EF Calculated: 53.5 %            1.8 cm                                                      Ascending Aorta: 2.4 cm                                                     LA volume/Index: 26.8                                                     ml /13m^2  Doppler Measurements & Calculations   MV Peak E-Wave: 121 cm/s   AV Peak Velocity: 174  LVOT Peak Velocity: 120  MV Peak A-Wave: 136 cm/s   cm/s                   cm/s  MV E/A Ratio: 0.89         AV Peak Gradient:      LVOT Peak Gradient: 6  MV Peak Gradient: 5.86     12.11 mmHg             mmHg  mmHg                                                    TV Peak E-Wave: 53.6  MV Deceleration Time: 264                         cm/s  msec                             IVRT: 95 msec          TV Peak Gradient: 1.15                                                    mmHg  MV E' Septal Velocity: 7.7                        TR Velocity:244 cm/s  cm/s                       AV DVI (Vmax):0.69     TR Gradient:23.81 mmHg  MV A' Septal Velocity: 9.6                        PV Peak Velocity: 88.7  cm/s                                              cm/s  MV E' Lateral Velocity:                           PV Peak Gradient: 3.15  9.4 cm/s                                          mmHg  MV A' Lateral Velocity:  10.2 cm/s  E/E' septal: 15.71  E/E' lateral: 12.87  [id number]    CTA CHEST W WO CONTRAST    Result Date: 2/18/2022  CTA chest Comparison: None. Findings: Uniform density through the thyroid gland.  Lymphoid hyperplasia in the superior mediastinum, hilar regions and infracarinal tissues. Adenopathy may be reactive to scattered atelectasis and infiltrate through the lungs bilaterally. There is groundglass opacification centrally in the upper lobes with predominantly atelectasis and consolidative infiltrate scattered through the lower lobes and right middle lobe. Minimal airspace opacification is noted to the lingula. There is no evidence of pulmonary embolism or aortic dissection. Small hernia is evident with moderate volume of air seen into the distal esophagus suggesting underlying gastroesophageal reflux disease, correlate clinically. The included portions of the upper abdomen are notable for hepatomegaly. Axillary lymph nodes are benign appearing. The patient's breast tissues are that of scattered fibroglandular nature. For healthcare maintenance purposes she should be reminded that the [employer] recommends screening mammography commencing at the age of 37. Impression: Bilateral pneumonia with significant reactive adenopathy. Follow-up recommended to ensure complete clearing of via chest radiography and subsequent CT examination across the next 6-8 weeks. This document has been electronically signed by: Zulma Bethea MD on 02/18/2022 10:52 PM All CTs at this facility use dose modulation techniques and iterative reconstructions, and/or weight-based dosing when appropriate to reduce radiation to a low as reasonably achievable. XR CHEST PORTABLE    Result Date: 2/18/2022  PROCEDURE: XR CHEST PORTABLE CLINICAL INFORMATION: SOB. COMPARISON: Chest x-ray dated 23 May 2019. Igor Mckeon TECHNIQUE: AP upright view of the chest. FINDINGS: There is mild cardiomegaly. .The mediastinum is not widened. There is slightly increased density throughout both lung fields. There are no effusions. . The pulmonary vascularity is increased. No suspicious osseous lesions are present.      1. Cardiomegaly and possible mild congestive heart failure. 2.. Slightly increased density throughout both lung fields. **This report has been created using voice recognition software. It may contain minor errors which are inherent in voice recognition technology. ** Final report electronically signed by DR Sherren Crosser on 2/18/2022 9:01 PM      Assessment/Plan:    1. Acute respiratory failure secondary to #2  Continue oxygen by nasal cannula, incentive spirometry, aggressive bronchodilator treatments    2. Community-acquired pneumonia/bilateral  Continue IV antibiotics, will add IV steroids    3. Tobacco abuse   Encouraged to quit continue nicotine patch    4.   Hypertension   Under control continue home medications      Electronically signed by Alexi Zavala MD on 2/21/2022 at 2:12 PM    Rounding Hospitalist

## 2022-02-22 LAB
ANION GAP SERPL CALCULATED.3IONS-SCNC: 9 MEQ/L (ref 8–16)
BASOPHILS # BLD: 0.3 %
BASOPHILS ABSOLUTE: 0.1 THOU/MM3 (ref 0–0.1)
BUN BLDV-MCNC: 24 MG/DL (ref 7–22)
CALCIUM SERPL-MCNC: 8.4 MG/DL (ref 8.5–10.5)
CHLORIDE BLD-SCNC: 106 MEQ/L (ref 98–111)
CO2: 25 MEQ/L (ref 23–33)
CREAT SERPL-MCNC: 0.7 MG/DL (ref 0.4–1.2)
EOSINOPHIL # BLD: 0.1 %
EOSINOPHILS ABSOLUTE: 0 THOU/MM3 (ref 0–0.4)
ERYTHROCYTE [DISTWIDTH] IN BLOOD BY AUTOMATED COUNT: 14.2 % (ref 11.5–14.5)
ERYTHROCYTE [DISTWIDTH] IN BLOOD BY AUTOMATED COUNT: 52.8 FL (ref 35–45)
GFR SERPL CREATININE-BSD FRML MDRD: > 90 ML/MIN/1.73M2
GLUCOSE BLD-MCNC: 185 MG/DL (ref 70–108)
GLUCOSE BLD-MCNC: 190 MG/DL (ref 70–108)
GLUCOSE BLD-MCNC: 289 MG/DL (ref 70–108)
GLUCOSE BLD-MCNC: 294 MG/DL (ref 70–108)
GLUCOSE BLD-MCNC: 324 MG/DL (ref 70–108)
HCT VFR BLD CALC: 54.6 % (ref 37–47)
HEMOGLOBIN: 16.4 GM/DL (ref 12–16)
IMMATURE GRANS (ABS): 0.14 THOU/MM3 (ref 0–0.07)
IMMATURE GRANULOCYTES: 0.7 %
LYMPHOCYTES # BLD: 6.7 %
LYMPHOCYTES ABSOLUTE: 1.4 THOU/MM3 (ref 1–4.8)
MCH RBC QN AUTO: 30.6 PG (ref 26–33)
MCHC RBC AUTO-ENTMCNC: 30 GM/DL (ref 32.2–35.5)
MCV RBC AUTO: 101.9 FL (ref 81–99)
MONOCYTES # BLD: 2.1 %
MONOCYTES ABSOLUTE: 0.4 THOU/MM3 (ref 0.4–1.3)
NUCLEATED RED BLOOD CELLS: 0 /100 WBC
PLATELET # BLD: 296 THOU/MM3 (ref 130–400)
PMV BLD AUTO: 10.2 FL (ref 9.4–12.4)
POTASSIUM SERPL-SCNC: 5.6 MEQ/L (ref 3.5–5.2)
RBC # BLD: 5.36 MILL/MM3 (ref 4.2–5.4)
SEG NEUTROPHILS: 90.1 %
SEGMENTED NEUTROPHILS ABSOLUTE COUNT: 18.7 THOU/MM3 (ref 1.8–7.7)
SODIUM BLD-SCNC: 140 MEQ/L (ref 135–145)
WBC # BLD: 20.8 THOU/MM3 (ref 4.8–10.8)

## 2022-02-22 PROCEDURE — 94640 AIRWAY INHALATION TREATMENT: CPT

## 2022-02-22 PROCEDURE — 6370000000 HC RX 637 (ALT 250 FOR IP)

## 2022-02-22 PROCEDURE — 82948 REAGENT STRIP/BLOOD GLUCOSE: CPT

## 2022-02-22 PROCEDURE — 6370000000 HC RX 637 (ALT 250 FOR IP): Performed by: HOSPITALIST

## 2022-02-22 PROCEDURE — 94761 N-INVAS EAR/PLS OXIMETRY MLT: CPT

## 2022-02-22 PROCEDURE — 2700000000 HC OXYGEN THERAPY PER DAY

## 2022-02-22 PROCEDURE — 2580000003 HC RX 258

## 2022-02-22 PROCEDURE — 80048 BASIC METABOLIC PNL TOTAL CA: CPT

## 2022-02-22 PROCEDURE — APPSS30 APP SPLIT SHARED TIME 16-30 MINUTES: Performed by: NURSE PRACTITIONER

## 2022-02-22 PROCEDURE — 2580000003 HC RX 258: Performed by: INTERNAL MEDICINE

## 2022-02-22 PROCEDURE — 6370000000 HC RX 637 (ALT 250 FOR IP): Performed by: NURSE PRACTITIONER

## 2022-02-22 PROCEDURE — 6360000002 HC RX W HCPCS

## 2022-02-22 PROCEDURE — 94760 N-INVAS EAR/PLS OXIMETRY 1: CPT

## 2022-02-22 PROCEDURE — 97116 GAIT TRAINING THERAPY: CPT

## 2022-02-22 PROCEDURE — 6360000002 HC RX W HCPCS: Performed by: INTERNAL MEDICINE

## 2022-02-22 PROCEDURE — 1200000003 HC TELEMETRY R&B

## 2022-02-22 PROCEDURE — 85025 COMPLETE CBC W/AUTO DIFF WBC: CPT

## 2022-02-22 PROCEDURE — 6370000000 HC RX 637 (ALT 250 FOR IP): Performed by: INTERNAL MEDICINE

## 2022-02-22 PROCEDURE — 1200000000 HC SEMI PRIVATE

## 2022-02-22 PROCEDURE — 99232 SBSQ HOSP IP/OBS MODERATE 35: CPT | Performed by: INTERNAL MEDICINE

## 2022-02-22 PROCEDURE — 97530 THERAPEUTIC ACTIVITIES: CPT

## 2022-02-22 PROCEDURE — 6360000002 HC RX W HCPCS: Performed by: NURSE PRACTITIONER

## 2022-02-22 PROCEDURE — 97535 SELF CARE MNGMENT TRAINING: CPT

## 2022-02-22 PROCEDURE — 97162 PT EVAL MOD COMPLEX 30 MIN: CPT

## 2022-02-22 RX ORDER — PREDNISONE 20 MG/1
40 TABLET ORAL DAILY
Status: DISCONTINUED | OUTPATIENT
Start: 2022-02-23 | End: 2022-02-25 | Stop reason: HOSPADM

## 2022-02-22 RX ORDER — METHYLPREDNISOLONE SODIUM SUCCINATE 40 MG/ML
40 INJECTION, POWDER, LYOPHILIZED, FOR SOLUTION INTRAMUSCULAR; INTRAVENOUS EVERY 12 HOURS
Status: COMPLETED | OUTPATIENT
Start: 2022-02-22 | End: 2022-02-23

## 2022-02-22 RX ADMIN — GABAPENTIN 800 MG: 400 CAPSULE ORAL at 14:16

## 2022-02-22 RX ADMIN — DILTIAZEM HYDROCHLORIDE 30 MG: 30 TABLET, FILM COATED ORAL at 08:51

## 2022-02-22 RX ADMIN — Medication 9 MG: at 20:58

## 2022-02-22 RX ADMIN — METOPROLOL TARTRATE 50 MG: 50 TABLET, FILM COATED ORAL at 08:50

## 2022-02-22 RX ADMIN — CEFTRIAXONE SODIUM 2000 MG: 2 INJECTION, POWDER, FOR SOLUTION INTRAMUSCULAR; INTRAVENOUS at 21:01

## 2022-02-22 RX ADMIN — PREDNISONE 40 MG: 20 TABLET ORAL at 08:51

## 2022-02-22 RX ADMIN — LISINOPRIL 20 MG: 20 TABLET ORAL at 08:51

## 2022-02-22 RX ADMIN — GUAIFENESIN 600 MG: 600 TABLET, EXTENDED RELEASE ORAL at 08:50

## 2022-02-22 RX ADMIN — ENOXAPARIN SODIUM 60 MG: 100 INJECTION SUBCUTANEOUS at 01:17

## 2022-02-22 RX ADMIN — ASPIRIN 81 MG CHEWABLE TABLET 81 MG: 81 TABLET CHEWABLE at 08:52

## 2022-02-22 RX ADMIN — AZITHROMYCIN DIHYDRATE 500 MG: 500 INJECTION, POWDER, LYOPHILIZED, FOR SOLUTION INTRAVENOUS at 21:01

## 2022-02-22 RX ADMIN — BUDESONIDE 500 MCG: 0.5 INHALANT RESPIRATORY (INHALATION) at 08:39

## 2022-02-22 RX ADMIN — ATORVASTATIN CALCIUM 40 MG: 40 TABLET, FILM COATED ORAL at 20:59

## 2022-02-22 RX ADMIN — DILTIAZEM HYDROCHLORIDE 30 MG: 30 TABLET, FILM COATED ORAL at 14:16

## 2022-02-22 RX ADMIN — METHYLPREDNISOLONE SODIUM SUCCINATE 40 MG: 40 INJECTION, POWDER, FOR SOLUTION INTRAMUSCULAR; INTRAVENOUS at 11:37

## 2022-02-22 RX ADMIN — IPRATROPIUM BROMIDE AND ALBUTEROL SULFATE 1 AMPULE: .5; 3 SOLUTION RESPIRATORY (INHALATION) at 11:36

## 2022-02-22 RX ADMIN — BUDESONIDE 500 MCG: 0.5 INHALANT RESPIRATORY (INHALATION) at 15:59

## 2022-02-22 RX ADMIN — LISINOPRIL 20 MG: 20 TABLET ORAL at 20:59

## 2022-02-22 RX ADMIN — IPRATROPIUM BROMIDE AND ALBUTEROL SULFATE 1 AMPULE: .5; 3 SOLUTION RESPIRATORY (INHALATION) at 08:39

## 2022-02-22 RX ADMIN — ENOXAPARIN SODIUM 60 MG: 100 INJECTION SUBCUTANEOUS at 11:37

## 2022-02-22 RX ADMIN — GUAIFENESIN 600 MG: 600 TABLET, EXTENDED RELEASE ORAL at 20:59

## 2022-02-22 RX ADMIN — GABAPENTIN 800 MG: 400 CAPSULE ORAL at 20:59

## 2022-02-22 RX ADMIN — IPRATROPIUM BROMIDE AND ALBUTEROL SULFATE 1 AMPULE: .5; 3 SOLUTION RESPIRATORY (INHALATION) at 21:55

## 2022-02-22 RX ADMIN — INSULIN GLARGINE 60 UNITS: 100 INJECTION, SOLUTION SUBCUTANEOUS at 21:14

## 2022-02-22 RX ADMIN — OLANZAPINE 10 MG: 10 TABLET, FILM COATED ORAL at 20:59

## 2022-02-22 RX ADMIN — METOPROLOL TARTRATE 50 MG: 50 TABLET, FILM COATED ORAL at 20:59

## 2022-02-22 RX ADMIN — GABAPENTIN 800 MG: 400 CAPSULE ORAL at 08:51

## 2022-02-22 RX ADMIN — DILTIAZEM HYDROCHLORIDE 30 MG: 30 TABLET, FILM COATED ORAL at 20:59

## 2022-02-22 RX ADMIN — AMITRIPTYLINE HYDROCHLORIDE 10 MG: 10 TABLET, FILM COATED ORAL at 20:58

## 2022-02-22 RX ADMIN — IPRATROPIUM BROMIDE AND ALBUTEROL SULFATE 1 AMPULE: .5; 3 SOLUTION RESPIRATORY (INHALATION) at 15:59

## 2022-02-22 RX ADMIN — PANTOPRAZOLE SODIUM 40 MG: 40 TABLET, DELAYED RELEASE ORAL at 04:04

## 2022-02-22 NOTE — PROGRESS NOTES
[facility]    Patient - Tosha Dutta   MRN -  [id number]   Susan Lujan # - [de-identified]   - 1980      Date of Admission -  2022  8:17 PM  Date of evaluation -  2022  Room - [address] [place] [address] Street, MD Primary Care Physician - ROBBIE Forman - CNP   Reason for consult   AECOPD with acute hypoxic respiratory failure   Active Hospital Problem List      Active Hospital Problems    Diagnosis Date Noted    Pneumonia due to infectious organism [J18.9]     JACIEL and COPD overlap syndrome ([facility] [G47.33, J44.9]     Acute respiratory failure with hypoxia ([facility] [J96.01] 2022     HPI   Tosha Dutta is a 37 y.o. female with a PMH of hypertension, diabetes mellitus type 2, COPD and tobacco abuse presented with worsening shortness of breath and wheezing of 4 days duration. She denies any chest pain, fever, chills, cough. She states she does not have increased sputum production at home. She is not on any home O2. She is being treated outpatient for COPD with Symbicort and albuterol inhalers, states she has not seen pulmonology previously and has not had formal PFTs done. Patient smokes half pack per day she has been smoking for the last 20 years, she denies childhood asthma. Patient's oxygen saturation was  60% on room air. She was initiated on nonrebreather with improvement of SPO2 to 94%. She is eventually weaned down to 6 L NC. Patient received one-time dose of Rocephin and azithromycin for community-acquired pneumonia. CTA chest obtained demonstrated bilateral pneumonia significant reactive adenopathy.  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     Past 24 hrs   -on 5 LPM via NC   -Reports less wheezing today  -cough becoming more productive yellow sputum   All other systems reviewed  Past Medical History         Diagnosis Date    Anxiety     Depression     Diabetes mellitus ([facility]     Hyperlipidemia     Hypertension       Past Surgical History           Procedure Laterality Date    ABSCESS DRAINAGE       Diet    ADULT DIET; Regular; 5 carb choices (75 gm/meal)  Allergies    Codeine  Social History     Social History     Socioeconomic History    Marital status: Single     Spouse name: Not on file    Number of children: Not on file    Years of education: Not on file    Highest education level: Not on file   Occupational History    Not on file   Tobacco Use    Smoking status: Current Every Day Smoker     Packs/day: 1.00     Years: 15.00     Pack years: 15.00     Types: Cigarettes    Smokeless tobacco: Never Used   Substance and Sexual Activity    Alcohol use: No    Drug use: No    Sexual activity: Yes     Partners: Male   Other Topics Concern    Not on file   Social History Narrative    Not on file     Social Determinants of Health     Financial Resource Strain:     Difficulty of Paying Living Expenses: Not on file   Food Insecurity:     Worried About Running Out of Food in the Last Year: Not on file    Luis Felipe of Food in the Last Year: Not on file   Transportation Needs:     Lack of Transportation (Medical): Not on file    Lack of Transportation (Non-Medical):  Not on file   Physical Activity:     Days of Exercise per Week: Not on file    Minutes of Exercise per Session: Not on file   Stress:     Feeling of Stress : Not on file   Social Connections:     Frequency of Communication with Friends and Family: Not on file    Frequency of Social Gatherings with Friends and Family: Not on file    Attends Ca[employer] Services: Not on file    Active Member of Clubs or Organizations: Not on file    Attends Club or Organization Meetings: Not on file    Marital Status: Not on file   Intimate Partner Violence:     Fear of Current or Ex-Partner: Not on file    Emotionally Abused: Not on file    Physically Abused: Not on file    Sexually Abused: Not on file   Housing Stability:     Unable to Pay for Housing in the Last Year: Not on file    Number of Places Lived in the Last Year: Not on file    Unstable Housing in the Last Year: Not on file     Family History          Problem Relation Age of Onset    High Blood Pressure Mother     Heart Disease Mother     High Cholesterol Mother     Depression Mother     Stroke Mother     Diabetes Maternal Aunt     High Blood Pressure Maternal Aunt     High Cholesterol Maternal Aunt      Sleep History     No history but high suspicion of JACIEL per history   Meds    Current Medications    methylPREDNISolone  40 mg IntraVENous Q12H    budesonide  0.5 mg Nebulization BID    guaiFENesin  600 mg Oral BID    amitriptyline  10 mg Oral Nightly    aspirin  81 mg Oral Daily    atorvastatin  40 mg Oral Nightly    dilTIAZem  30 mg Oral TID    gabapentin  800 mg Oral TID    OLANZapine  10 mg Oral Nightly    ipratropium-albuterol  1 ampule Inhalation Q4H WA    azithromycin  500 mg IntraVENous Q24H    sodium chloride flush  10 mL IntraVENous 2 times per day    pantoprazole  40 mg Oral QAM AC    enoxaparin  60 mg SubCUTAneous Q12H    insulin glargine  60 Units SubCUTAneous Nightly    insulin lispro  0-18 Units SubCUTAneous TID WC    insulin lispro  0-9 Units SubCUTAneous Nightly    cefTRIAXone (ROCEPHIN) IV  2,000 mg IntraVENous Q24H    lisinopril  20 mg Oral BID    metoprolol  50 mg Oral BID    nicotine  1 patch TransDERmal Daily     albuterol, melatonin, sodium chloride flush, sodium chloride, ondansetron **OR** ondansetron, polyethylene glycol, acetaminophen **OR** acetaminophen, glucose, glucagon (rDNA), dextrose, dextrose bolus (hypoglycemia) **OR** dextrose bolus (hypoglycemia)  IV Drips/Infusions   sodium chloride      dextrose      sodium chloride 125 mL/hr at 02/21/22 1325     Vitals    Vitals height is 5' (1.524 m) and weight is 290 lb 8 oz (131.8 kg). Her oral temperature is 98 °F (36.7 °C). Her blood pressure is 145/84 (abnormal) and her pulse is 96. Her respiration is 18 and oxygen saturation is 90%. O2 Flow Rate (L/min): 6 L/min  I/O    Intake/Output Summary (Last 24 hours) at 2/22/2022 1622  Last data filed at 2/22/2022 1420  Gross per 24 hour   Intake 500 ml   Output --   Net 500 ml     Patient Vitals for the past 96 hrs (Last 3 readings):   Weight   02/22/22 0429 290 lb 8 oz (131.8 kg)   02/21/22 0600 290 lb 5.5 oz (131.7 kg)   02/19/22 0600 271 lb (122.9 kg)     Exam   Physical Exam   Constitutional: No distress on O2 per NC. Patient appears obese BMI 56  Head: Normocephalic and atraumatic. Mouth/Throat: Oropharynx is clear and moist.  No oral thrush. Eyes: Conjunctivae are normal. Pupils are equal, round. No scleral icterus. Neck: Neck supple. No tracheal deviation present. Cardiovascular: S1 and S2 with no murmur. BLE edema  Pulmonary/Chest: Normal effort with bilateral air entry, diminished breath sounds with intermittent expiratory wheezes. No stridor. No respiratory distress. Patient exhibits no tenderness. Abdominal: Soft. Bowel sounds audible. No tenderness to palp.    Musculoskeletal: Moves all extremities  Neurological: Patient is alert and follows simple commands     Labs   ABG  Lab Results   Component Value Date    PH 7.37 02/19/2022    PO2 54 02/19/2022    PCO2 54 02/19/2022    HCO3 31 02/19/2022    O2SAT 86 02/19/2022     Lab Results   Component Value Date    IFIO2 5 02/19/2022     CBC  Recent Labs     02/20/22  1029 02/22/22  1410   WBC 17.4* 20.8*   RBC 5.57* 5.36   HGB 17.0* 16.4*   HCT 57.4* 54.6*   .1* 101.9*   MCH 30.5 30.6   MCHC 29.6* 30.0*    296   MPV 10.2 10.2      BMP  Recent Labs     02/20/22  1029 02/22/22  1410    140   K 5.0 5.6*    106   CO2 27 25   BUN 31* 24*   CREATININE 1.4* 0.7   GLUCOSE 232* 289*   CALCIUM 7.9* 8.4* LFT  Recent Labs     02/20/22  1029   AST 33   ALT 54   BILITOT 0.2*   ALKPHOS 151*     TROP  Lab Results   Component Value Date    TROPONINT < 0.010 02/18/2022    TROPONINT < 0.010 08/08/2018    TROPONINT < 0.010 07/11/2017     BNP  Lab Results   Component Value Date    PROBNP 2561.0 02/18/2022    PROBNP 10.2 07/11/2017     D-Dimer  Lab Results   Component Value Date    DDIMER 377.00 08/08/2018     Lactic Acid  Recent Labs     02/19/22  2121 02/20/22  1029   LACTA 2.3* 1.2     INR  No results for input(s): INR, PROTIME in the last 72 hours. PTT  No results for input(s): APTT in the last 72 hours. Glucose  Recent Labs     02/22/22  0859 02/22/22  1051 02/22/22  1559   POCGLU 190* 185* 294*     UA No results for input(s): SPECGRAV, PHUR, COLORU, CLARITYU, MUCUS, PROTEINU, BLOODU, RBCUA, WBCUA, BACTERIA, NITRU, GLUCOSEU, BILIRUBINUR, UROBILINOGEN, KETUA, LABCAST, LABCASTTY, AMORPHOS in the last 72 hours. Invalid input(s): CRYSTALS. PFTs   No records  Echo     Summary   Technically difficult examination. Left ventricular size and systolic function is normal. Ejection fraction   was estimated at 55-60%. LV wall thickness is within normal limits and   there are no obvious wall motion abnormalities. The right ventricular size appears normal with normal systolic function   and wall thickness. Cultures    Procalcitonin  Lab Results   Component Value Date    PROCAL 0.12 02/18/2022    PROCAL 0.05 08/08/2018    PROCAL 0.03 07/11/2017     Radiology    CXR    XR CHEST PORTABLE   2/18/2022   FINDINGS:  There is mild cardiomegaly. .The mediastinum is not widened. There is slightly increased density throughout both lung fields. There are no effusions. . The pulmonary vascularity is increased. No suspicious osseous lesions are present. Impression:  1. Cardiomegaly and possible mild congestive heart failure. 2.. Slightly increased density throughout both lung fields.        CT Scans  CTA   2/18/2022  Findings: Uniform density through the thyroid gland. Lymphoid hyperplasia in the superior mediastinum, hilar regions and infracarinal tissues. Adenopathy may be reactive to scattered atelectasis and infiltrate through the lungs bilaterally. There is groundglass opacification centrally in the upper lobes with predominantly atelectasis and consolidative infiltrate scattered through the lower lobes and right middle lobe. Minimal airspace opacification is noted to the lingula. There is no evidence of pulmonary embolism or aortic dissection. Small hernia is evident with moderate volume of air seen into the distal esophagus suggesting underlying gastroesophageal reflux disease, correlate clinically. The included portions of the upper abdomen are notable for hepatomegaly. Axillary lymph nodes are benign appearing. The patient's breast tissues are that of scattered fibroglandular nature. For healthcare maintenance purposes she should be reminded that the [employer] recommends screening mammography commencing at the age of 37. Impression:  Bilateral pneumonia with significant reactive adenopathy. Follow-up recommended to ensure complete clearing of via chest radiography and subsequent CT examination across the next 6-8 weeks.          (See actual reports for details)    Assessment   -Acute respiratory failure with hypoxia 2/2 COPD exacerbation  -COPD exacerbation  -Right middle lobe and lingular infiltrate/atelectasis  -Tobacco abuse  -Obesity with high suspicion for obstructive sleep apnea  -Hypertension  -Diabetes mellitus type 2  Recommendations   -Monitor SpO2 wean supplemental O2 to maintain SpO2 >90%  -Agree with empiric ATB's Azithromycin and Rocephin  -Solumedrol 40 gm IV BID transition to PO prednisone 40 mg in AM   -NRT 14 mg per Day advised to continue efforts of tobacco cessation  -Duonebs Q 4 hrs WA   -Pulmicort 500 mcg via neb BID   -Guaifenesin 600 mg PO BID   -Pulmonary hygiene with IS and acapella -Home O2 eval tomorrow   -Will need follow-up in Pulmonary clinic in 3 months with Full PFT and Ct chest to follow lingula infiltrate    Case discussed with nurse and patient/family. Questions and concerns addressed. Meds and Orders reviewed. Electronically signed by     ROBBIE Patrick CNP on 2/22/2022 at 4:22 PM    Vitals:    02/22/22 1611   BP: (!) 145/84   Pulse: 96   Resp: 18   Temp: 98 °F (36.7 °C)   SpO2: 90%     Quite comfortable on NC  Afebrile  Chest: bilateral rhonchi, on ICS/bronchodlators  Continue plans, short course of steroids, complete ATBs  Smoking cessation    Patient seen and examined independently by me. Above discussed and I agree with  CNP note Also see my additional comments. Labs, cultures, and radiographs when available were reviewed. Changes were made in the orders as necessary. I discussed patient concerns with Beverly MIRELES and instructions were given. Respiratory care issues addressed. Please see our orders for the updated patient care plan.     Electronically signed by     Lionel Burroughs MD on 2/22/2022 at 5:36 PM

## 2022-02-22 NOTE — CARE COORDINATION
2/22/22, 12:56 PM EST    DISCHARGE ON GOING [facility] 8 day: 4  Location: formerly [facility]27/027-A Reason for admit: Acute respiratory failure with hypoxia ([facility] [address] [J96.01]  Pneumonia due to infectious organism, unspecified laterality, unspecified part of lung [J18.9]   Procedure: 2/18 CTA chest: Impression: Bilateral pneumonia with significant reactive adenopathy. Follow-up recommended to ensure complete clearing of via chest radiography and subsequent CT examination across the next 6-8 weeks. 2/18 CXR: 1. Cardiomegaly and possible mild congestive heart failure. 2.. Slightly increased density throughout both lung fields. Barriers to Discharge: Following for pneumonia. COPD. Pulmonary following. Prednisone. Duonebs. Zithromax and Rocephin. Smoking cessation encouraged. IS and Acapella. Currently on 5L/NC with sat 92%. PCP: ROBBIE Jalloh CNP  Readmission Risk Score: 13.4 ( )%  Patient Goals/Plan/Treatment Preferences: Cha Mendieta plans home independently. To have OP Sleep Study. Follow for home O2 needs.

## 2022-02-22 NOTE — PROGRESS NOTES
[address] [facility]  Occupational Therapy  Daily Note  Time:   Time In: 901  Time Out: 933  Timed Code Treatment Minutes: 28 Minutes  Minutes: 32          Date: 2022  Patient Name: Anahy Carty,   Gender: female      Room: [facility]027-A  MRN: [id number]  : 1980  (37 y.o.)  Referring Practitioner: Dr. Tristin Fontaine MD  Diagnosis: Acute Respiratory Failure with Hypoxia  Additional Pertinent Hx: Patient presents with worsening shortness of breath. Family in room to assist with history. Symptoms reportedly began about 4 days prior to admission. She describes worsening dyspnea and increased wheezing over the last few days. No obvious inciting incident noted by patient. She denies any chest pain, fever, chills, cough. She states she does not have increased sputum production at home. She is not on any home O2. She is being treated outpatient for COPD with Symbicort and albuterol inhalers, states she has not seen pulmonology previously and has not had formal PFTs done. Per family, patient has been acting strangely at home with some mild confusion and worsening physical condition. Son states that she has had increased difficulty ambulating throughout the house. Patient also endorsing some diaphoresis at home, but states that her heart rate is typically high but has noticed heart racing the last few days. Restrictions/Precautions:  Restrictions/Precautions: General Precautions,Fall Risk  Position Activity Restriction  Other position/activity restrictions: O2     SUBJECTIVE: Nurse Amparo Campbell ok'd session, Up in bathroom with staff upon arrival, agreeable to OT session    PAIN: 0/10:     Vitals: Patient on 5 L O2 via Nasal Canula  upon arrival to room. Patient O2 sats at 92 %. Upon activity patient dropping O2 at 85 %. Pt requiring minimal rest break(s) to recover. COGNITION: Decreased Insight and Decreased Problem Solving    ADL:   Lower Extremity Dressing: with set-up. used reacher to thread depends. BED MOBILITY:  Not Tested    TRANSFERS:  Sit to Stand:  Stand By Assistance. EOB and bedside chair  Stand to Sit: Stand By Assistance. FUNCTIONAL MOBILITY:  Assistive Device: Rolling Walker  Assist Level:  Stand By Assistance. Distance: from bathroom and easily SOB     ADDITIONAL ACTIVITIES:  Educated patient on purchasing options for hip kit, voiced understanding    ASSESSMENT:     Activity Tolerance:  Patient tolerance of  treatment: good. Discharge Recommendations: Home with assist as needed and Home with Home Health OT  Equipment Recommendations: Equipment Needed: Yes  Other: Shower seat; grabbars in the tub/shower  Plan: Times per week: 5x  Specific instructions for Next Treatment: Functional mobility; ADLs and endurance training; Energy conservation techniques applied during routine tasks  Current Treatment Recommendations: Functional Mobility Training,Endurance Training,Self-Care / ADL,Patient/Caregiver Education & Training  Plan Comment: Pt would benefit from continued skilled OT services when medically stable and discharged from Acute. OPOT recommended. Patient Education  Patient Education: ADL's    Goals  Short term goals  Time Frame for Short term goals: By discharge  Short term goal 1: Pt will demonstrate functional mobility within the kitchen or bathroom environment without any AD while carrying light objects with SBA to prepare for doing ADLs and light homemaking. Short term goal 2: Pt will complete a spongebath or dressing herself while following energy conservation strategies while keeping O2 saturation > than 90% with SBA and min cues as needed to increase her activity tolerance for ease of doing her morning routine. Short term goal 3: Pt will complete BUE ROM/light resistance exercises x 10 reps each of 5 sets with cues for pursed lip breathing to increase her endurance for ease of doing ADLs and returning to work.   Short term goal 4: Pt will describe 3 ways with which she will stop smoking with verbal cues as needed to increase her energy levels and help her lung function and overall health. Following session, patient left in safe position with all fall risk precautions in place.

## 2022-02-22 NOTE — PROGRESS NOTES
Hospitalist Progress Note  [facility]       Patient: Matthew Araiza  Unit/Bed: 3C-41/111-F  YOB: 1980  MRN: [id number]  Acct: [de-identified]   AdmittingDiagnosis: Acute respiratory failure with hypoxia ([address] [J96.01]  Pneumonia due to infectious organism, unspecified laterality, unspecified part of lung [J18.9]  Admit Date: 2/18/2022  Hospital Day: 4    Subjective:    Continues to require oxygen by nasal cannula occasionally short of breath and reports wheezing but no cough or expectoration    Objective:   BP (!) 162/74   Pulse 96   Temp 98.4 °F (36.9 °C) (Oral)   Resp 24   Ht 5' (1.524 m)   Wt 290 lb 8 oz (131.8 kg)   SpO2 92%   BMI 56.73 kg/m²   No intake or output data in the 24 hours ending 02/22/22 1312    General appearance: A&O x3, Not ill or toxic, in no apparent distress  HEENT:  JUNI  EOM intact. Neck: Supple, with full range of motion. No jugular venous distention. Trachea midline. Respiratory:   NL A/E bilat with no adventitious sounds   Cardiovascular:  normal S1/S2 with no murmurs/gallops  Abdomen: Soft, non-tender, non-distended, no rigidity or peritoneal signs  Musculoskeletal: NL symmetrical A/PROM bilat U/L extremities   Skin: No rashes. No edema  Neurologic:  CN II-XII intact. NL symmetrical reflexes. NL gait and stance. NL Cerebellar exam. Power 5/5 all muscle groups U/L extremities.  Toes downgoing  Capillary Refill: Brisk,< 3 seconds   Peripheral Pulses: +2 palpable, equal bilaterally     DVT Prophylaxis: Lovenox    Data:  CBC:   Lab Results   Component Value Date    WBC 17.4 02/20/2022    HGB 17.0 02/20/2022    HCT 57.4 02/20/2022    .1 02/20/2022     02/20/2022     BMP:   Lab Results   Component Value Date     02/20/2022    K 5.0 02/20/2022    K 5.0 02/19/2022     02/20/2022    CO2 27 02/20/2022    PHOS 2.8 07/11/2014    BUN 31 02/20/2022    CREATININE 1.4 02/20/2022    CALCIUM 7.9 02/20/2022     ABGs:   Lab Results   Component Value Date    PH 7.37 02/19/2022    PCO2 54 02/19/2022    PO2 54 02/19/2022    HCO3 31 02/19/2022    O2SAT 86 02/19/2022     Troponin:   Lab Results   Component Value Date    TROPONINI <0.006 07/14/2013    TROPONINI <0.006 04/19/2012      LFTs   Lab Results   Component Value Date    AST 33 02/20/2022    ALT 54 02/20/2022    BILITOT 0.2 02/20/2022    BILITOT NEGATIVE 12/17/2011    ALKPHOS 151 02/20/2022          Imaging   ECHO Complete 2D W Doppler W Color    Result Date: 2/19/2022  Transthoracic Echocardiography Report (TTE)  Demographics   Patient Name  Maribel Haider  Gender             Female                S   MR #          [id number]    Race                                             Ethnicity   Account #     [de-identified]    Room Number        3180   Accession     [id number]   Date of Study      02/19/2022  Number   Date of Birth 1980   Referring          Rupa New                             Physician          Tim Davis MD   Age           37 year(s)   Sonographer        ALVINA Nevarez, RDJULIANA,                                                RDMS, RVT                              Interpreting       Diego Cohen MD                             Physician  Procedure Type of Study   TTE procedure:ECHOCARDIOGRAM COMPLETE 2D W DOPPLER W COLOR. Procedure Date Date: 02/19/2022 Start: 09:31 AM Study Location: Bedside Technical Quality: Limited visualization due to body habitus. Indications:Acute respiratory failure. Additional Medical History:acute respiratory failure, abdominal pain, tachycardia, sepsis, diabetes, hypertension, morbid obesity, hyperlipidemia Patient Status: Routine Height: 60 inches Weight: 270.01 pounds BSA: 2.12 m^2 BMI: 52.73 kg/m^2 BP: 173/84 mmHg  Conclusions   Summary  Technically difficult examination. Left ventricular size and systolic function is normal. Ejection fraction  was estimated at 55-60%.  LV wall thickness is within normal limits and  there are no obvious wall motion abnormalities. The right ventricular size appears normal with normal systolic function  and wall thickness. Signature   ----------------------------------------------------------------  Electronically signed by Rani Sandhu MD (Interpreting  physician) on 02/19/2022 at 01:11 PM  ----------------------------------------------------------------   Findings   Mitral Valve  The mitral valve structure is normal with normal leaflet separation. DOPPLER: The transmitral velocity was within the normal range with no  evidence for mitral stenosis. There was no evidence of mitral  regurgitation. Aortic Valve  The aortic valve appears trileaflet with normal thickness and leaflet  excursion. DOPPLER: Transaortic velocity was within the normal range with  no evidence of aortic stenosis. There was no evidence of aortic  regurgitation. Tricuspid Valve  The tricuspid valve structure is normal with normal leaflet separation. DOPPLER: There is no evidence of tricuspid stenosis. There was no evidence  of tricuspid regurgitation. Pulmonic Valve  The pulmonic valve was not well visualized . Left Atrium  Left atrial size is normal.   Left Ventricle  Left ventricular size and systolic function is normal. Ejection fraction  was estimated at 55-60%. LV wall thickness is within normal limits and  there are no obvious wall motion abnormalities. Right Atrium  Right atrial size was normal.   Right Ventricle  The right ventricular size appears normal with normal systolic function  and wall thickness. Pericardial Effusion  The pericardium appears normal with no evidence of a pericardial effusion. Pleural Effusion  No evidence of pleural effusion. Aorta / Great Vessels  -Aortic root dimension within normal limits. -IVC size is within normal limits with normal respiratory phasic changes.   M-Mode/2D Measurements & Calculations   LV Diastolic      LV Systolic Dimension: 2.7 cm    LA Dimension: 3.9 cmLA  Dimension: 3.7 cm LV Volume Diastolic: 52.7 ml     Area: 12.1 cm^2  LV FS:27 %        LV Volume Systolic: 27 ml  LV PW Diastolic:  LV EDV/LV EDV Index: 58.1 ml/27  1.3 cm            m^2LV ESV/LV ESV Index: 27 RT/17  Septum Diastolic: m^2                              RV Diastolic Dimension:  1.3 cm            EF Calculated: 53.5 %            1.8 cm                                                      Ascending Aorta: 2.4 cm                                                     LA volume/Index: 26.8                                                     ml /13m^2  Doppler Measurements & Calculations   MV Peak E-Wave: 121 cm/s   AV Peak Velocity: 174  LVOT Peak Velocity: 120  MV Peak A-Wave: 136 cm/s   cm/s                   cm/s  MV E/A Ratio: 0.89         AV Peak Gradient:      LVOT Peak Gradient: 6  MV Peak Gradient: 5.86     12.11 mmHg             mmHg  mmHg                                                    TV Peak E-Wave: 53.6  MV Deceleration Time: 264                         cm/s  msec                             IVRT: 95 msec          TV Peak Gradient: 1.15                                                    mmHg  MV E' Septal Velocity: 7.7                        TR Velocity:244 cm/s  cm/s                       AV DVI (Vmax):0.69     TR Gradient:23.81 mmHg  MV A' Septal Velocity: 9.6                        PV Peak Velocity: 88.7  cm/s                                              cm/s  MV E' Lateral Velocity:                           PV Peak Gradient: 3.15  9.4 cm/s                                          mmHg  MV A' Lateral Velocity:  10.2 cm/s  E/E' septal: 15.71  E/E' lateral: 12.87  [email or web address] [id number]    CTA CHEST W WO CONTRAST    Result Date: 2/18/2022  CTA chest Comparison: None. Findings: Uniform density through the thyroid gland.  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 **This report has been created using voice recognition software. It may contain minor errors which are inherent in voice recognition technology. ** Final report electronically signed by DR Zayda Gil on 2/18/2022 9:01 PM      Assessment/Plan:    1. Acute respiratory failure secondary to #2  Continue oxygen by nasal cannula, incentive spirometry, aggressive bronchodilator treatments,      2.  Community-acquired pneumonia/bilateral  Continue IV antibiotics,  increase IV steroids to 40 mg twice daily     3. Tobacco abuse   Encouraged to quit continue nicotine patch     4.   Hypertension   Under control continue home medications        Electronically signed by Eleuterio Fregoso MD on 2/22/2022 at 1:12 PM    Rounding Hospitalist

## 2022-02-22 NOTE — PROGRESS NOTES
[facility]  INPATIENT PHYSICAL THERAPY  EVALUATION  [facility] ORTHOPEDICS 7K - 6K-08/345-G    Time In: 6345  Time Out: 1037  Timed Code Treatment Minutes: 10 Minutes  Minutes: 18          Date: 2022  Patient Name: Candy Milton,  Gender:  female        MRN: [id number]  : 1980  (37 y.o.)      Referring Practitioner: Benitez Rueda MD  Diagnosis: Acute respiratory failure with hypoxia  Additional Pertinent Hx: Patient presents with worsening shortness of breath. Family in room to assist with history. Symptoms reportedly began about 4 days prior to admission. She describes worsening dyspnea and increased wheezing over the last few days. No obvious inciting incident noted by patient. She denies any chest pain, fever, chills, cough. She states she does not have increased sputum production at home. She is not on any home O2. She is being treated outpatient for COPD with Symbicort and albuterol inhalers, states she has not seen pulmonology previously and has not had formal PFTs done. Per family, patient has been acting strangely at home with some mild confusion and worsening physical condition. Son states that she has had increased difficulty ambulating throughout the house. Patient also endorsing some diaphoresis at home, but states that her heart rate is typically high but has noticed heart racing the last few days. Restrictions/Precautions:  Restrictions/Precautions: General Precautions,Fall Risk  Position Activity Restriction  Other position/activity restrictions: O2    Subjective:  Chart Reviewed: Yes  Patient assessed for rehabilitation services?: Yes  Family / Caregiver Present: No  Subjective: RN approved session.  Pt pleasant and agreeable to therapy    General:  Overall Orientation Status: Within Functional Limits  Follows Commands: Within Functional Limits    Vision: Impaired  Vision Exceptions: Wears glasses at all times    Hearing: Within functional limits         Pain: 0/10: denies pain     Vitals: Oxygen: 5L NC; PO2 >90% throughout most of session; did desaturate to 89% with ambulation, able to quickly recover    Social/Functional History:    Lives With: Family (With son, daughter and occasionally an 7 month old grandchild)  Type of Home: Apartment  Home Layout: Two level,Bed/Bath upstairs  Home Access: Stairs to enter with rails  Entrance Stairs - Number of Steps: 1 step at the threshhold without a rail; 1 flight of steps to her bedroom and bathroom with 1 rail     Bathroom Shower/Tub: Tub/Shower unit  Bathroom Toilet: Standard  Bathroom Accessibility: Accessible    Receives Help From: Family  ADL Assistance: Independent  Homemaking Assistance: Needs assistance  Homemaking Responsibilities: Yes  Ambulation Assistance: Independent  Transfer Assistance: Independent    Active : No  Occupation: Part time employment  Type of occupation: [employer] 4 days/wk for 5 hours each day  Leisure & Hobbies: Reading  Additional Comments: Pt was not using any AD for ambulation. She has been needing help from family members to do some of the homemaking secondary to fatigue levels. She is independent with self care. Pt only used an inhaler while at home prn. No supplemental O2 used. OBJECTIVE:  Range of Motion:  Bilateral Lower Extremity: WFL    Strength:  Bilateral Lower Extremity: Impaired - grossly deconditioned    Balance:  Static Sitting Balance:  Stand By Assistance  Static Standing Balance: Contact Guard Assistance    Bed Mobility:  Not Tested    Transfers:  Sit to Stand: Contact Guard Assistance  Stand to [employer] Assistance    Ambulation:  Contact Guard Assistance, X 1  Distance: 12' x2  Surface: Level Tile  Device:IV pole   Gait Deviations:   Forward Flexed Posture, Decreased Step Length Bilaterally, Decreased Gait Speed, Decreased Heel Strike Bilaterally and Wide Base of Support    Functional Outcome Measures: Completed  AM-PAC Inpatient Mobility Raw Score : increased functional ind  Short term goal 2: sit<>stand from various surfaces with LRD mod I for safe transfers  Short term goal 3: ambulate 48' with LRD mod I and good oxygen saturations for safe household distances  Short term goal 4: navigate 10 steps with LRD mod I and good oxygen saturations for safe enter/exit of home  Long term goals  Time Frame for Long term goals : NA d/t short ELOS    Following session, patient left in safe position with all fall risk precautions in place.     Kelly Ennis PT, DPT

## 2022-02-22 NOTE — PROGRESS NOTES
[facility]  INPATIENT OCCUPATIONAL THERAPY  [facility] ORTHOPEDICS 7K  EVALUATION    Time:   Time In: 1750  Time Out: 1820  Timed Code Treatment Minutes: 15 Minutes  Minutes: 30          Date: 2022  Patient Name: Luisa Marie,   Gender: female      MRN: [id number]  : 1980  (37 y.o.)  Referring Practitioner: Dr. Logan Leonard MD  Diagnosis: Acute Respiratory Failure with Hypoxia  Additional Pertinent Hx: Patient presents with worsening shortness of breath. Family in room to assist with history. Symptoms reportedly began about 4 days prior to admission. She describes worsening dyspnea and increased wheezing over the last few days. No obvious inciting incident noted by patient. She denies any chest pain, fever, chills, cough. She states she does not have increased sputum production at home. She is not on any home O2. She is being treated outpatient for COPD with Symbicort and albuterol inhalers, states she has not seen pulmonology previously and has not had formal PFTs done. Per family, patient has been acting strangely at home with some mild confusion and worsening physical condition. Son states that she has had increased difficulty ambulating throughout the house. Patient also endorsing some diaphoresis at home, but states that her heart rate is typically high but has noticed heart racing the last few days. Restrictions/Precautions:  Restrictions/Precautions: Fall Risk  Position Activity Restriction  Other position/activity restrictions: O2    Subjective  Chart Reviewed: Chad Beauchamp and Physical  Family / Caregiver Present: Yes (Son present and supportive)    Subjective: Pleasant and cooperative  Comments: RN approved session. Pt agreed to demonstrate functional mobility. She does not report having any pain. SOB noted with all activity. Coughing noted after having sat up.     Pain:  Pain Assessment  Patient Currently in Pain: Denies    Vitals: Oxygen: Pt shows O2 saturation of 88-89% while using 5 L of O2 both at rest and after having walked a short distance    Social/Functional History:  Lives With: Family (With son, daughter and occasionally an 7 month old grandchild)  Type of Home: Apartment  Home Layout: Two level,Bed/Bath upstairs  Home Access: Stairs to enter with rails  Entrance Stairs - Number of Steps: 1 step at the threshhold without a rail; 1 flight of steps to her bedroom and bathroom with 1 rail   Bathroom Shower/Tub: Tub/Shower unit  Bathroom Toilet: Standard  Bathroom Accessibility: Accessible    Receives Help From: Family  ADL Assistance: Independent  Homemaking Assistance: Needs assistance  Homemaking Responsibilities: Yes  Ambulation Assistance: Independent  Transfer Assistance: Independent    Active : No  Patient's  Info: Gets a ride to/from work from friends or a family member  Occupation: Part time employment  Type of occupation: [employer] 4 days/wk for 5 hours each day  Leisure & Hobbies: Reading  Additional Comments: Pt was not using any AD for ambulation. She has been needing help from family members to do some of the homemaking secondary to fatigue levels. She is independent with self care. Pt only used an inhaler while at home prn. No supplemental O2 used. VISION:Corrected    HEARING:  WFL    COGNITION: Decreased Safety Awareness    RANGE OF MOTION:  Bilateral Upper Extremity:  WFL    STRENGTH:  Bilateral Upper Extremity:  Impaired - 3+/5 deltoid; 3+/5 pectoral; 4/5 biceps/triceps    SENSATION:   WFL    ADL:   Upper Extremity Dressing: Contact Guard Assistance.  don/doffing a hospital gown as a house coat. BALANCE:  Sitting Balance:  Stand By Assistance. Scooting at the edge of bed  Standing Balance: Stand By Assistance.  preparing to walk    BED MOBILITY:  Supine to Sit: Stand By Assistance with head of bed elevated  Sit to Supine: Stand By Assistance, with head of bed raised    Scooting: Stand By Assistance, with rail TRANSFERS:  Sit to Stand:  Stand By Assistance. from the edge of bed  Stand to Sit: Stand By Assistance. to the edge of bed    FUNCTIONAL MOBILITY:  Assistive Device: IV pole  Assist Level:  Stand By Assistance. Distance: 16 ft in her room  Pt had an even step. Fair pace. Cues provided to take her time and not rush. Activity Tolerance:  Patient tolerance of  treatment: fair. Pt was SOB with exertion. She stood for 30 seconds while preparing to walk. Pt remained on 5L of O2 throughout. Assessment:  Assessment: Patient would benefit from continued skilled OT services to address above deficits. She presents with Acute respiratory failure with hypoxia. She has anxiety, DM and HTN. She has only used inhalers at home prior to admission. She is using 5L of O2 at this time and shows O2 saturation at 89% at rest.  She smokes cigarettes 1 ppd. Pt reported sleeping poorly as she wakes up often when she has difficulty with her breathing which is confirmed by a family member. Pt also works PT in a [employer]. She was independent with self care prior to admission. She was not using any AD for ambulation. Pt walked in her room with an IV pole with SBA. She did show SOB with exertion and had some coughing when she sat at the edge of the bed. Performance deficits / Impairments: Decreased functional mobility ,Decreased ADL status,Decreased endurance,Decreased safe awareness,Decreased high-level IADLs  Prognosis: Good  REQUIRES OT FOLLOW UP: Yes  Decision Making: Medium Complexity    Treatment Initiated: Treatment and education initiated within context of evaluation. Evaluation time included review of current medical information, gathering information related to past medical, social and functional history, completion of standardized testing, formal and informal observation of tasks, assessment of data and development of plan of care and goals.   Treatment time included skilled education and facilitation of tasks to increase safety and independence with ADL's for improved functional independence and quality of life. Energy conservation techniques were discussed with pt. A handout was provided. She described delegating things to her kids. Encouraged pt to have a balance between rest and activity to help her body stay healthy. Pt reports having difficulty with sleeping at night and the benefit of having enough deep sleep was discussed. Pt reported having poor energy levels and her family members states that she has been falling asleep very easily in recent months. Pt agreed to have a sleep study done to help determine whether she has sleep apnea. The use of the incentive spirometer was demonstrated: 10 reps with cues for taking slow and deep breaths. Pt was encouraged to keep practicing use of it as well as the A capella. Benefits of quitting smoking was discussed. Pt was not willing to participate in a smoking cessation group but did say she wants to quit. She has never before tried to quit. She has a nicotine patch at this time and it has been 3 days since her last cigarette. Encouraged to stay focused on this goal even if she should have a relapse and return to smoking. She was reminded that the habit is heavily influenced by the environment in which one lives. Encouraged pt to communicate with any family or friends with whom she spends time and who are smokers to not smoke whenever they are around her. Pt agreed. She also stated that the family member who is visiting with her at this time is a smoker.       Discharge Recommendations:  Continue to assess pending progress,Outpatient OT    Patient Education:  OT Education: Alexandra Flowers of [address] [place] [address]  Patient Education: Benefits of stopping smoking- use of the nicotine patch to help with her cravings initially; effect on her energy levels if she can get better rest at night;   Equipment Recommendations:  Equipment Needed: Yes  Other: Shower seat; grabbars in the tub/shower    Plan:  Times per week: 5x  Current Treatment Recommendations: Functional Mobility Training,Endurance Training,Self-Care / ADL,Patient/Caregiver Education & Training  Plan Comment: Pt would benefit from continued skilled OT services when medically stable and discharged from Acute. OPOT recommended. Specific instructions for Next Treatment: Functional mobility; ADLs and endurance training; Energy conservation techniques applied during routine tasks. See long-term goal time frame for expected duration of plan of care. If no long-term goals established, a short length of stay is anticipated. Goals:  Patient goals : \"I want to be able to be more active without getting so tired. \" pt states. Short term goals  Time Frame for Short term goals: By discharge  Short term goal 1: Pt will demonstrate functional mobility within the kitchen or bathroom environment without any AD while carrying light objects with SBA to prepare for doing ADLs and light homemaking. Short term goal 2: Pt will complete a spongebath or dressing herself while following energy conservation strategies while keeping O2 saturation > than 90% with SBA and min cues as needed to increase her activity tolerance for ease of doing her morning routine. Short term goal 3: Pt will complete BUE ROM/light resistance exercises x 10 reps each of 5 sets with cues for pursed lip breathing to increase her endurance for ease of doing ADLs and returning to work. Short term goal 4: Pt will describe 3 ways with which she will stop smoking with verbal cues as needed to increase her energy levels and help her lung function and overall health. Following session, patient left in safe position with all fall risk precautions in place.

## 2022-02-22 NOTE — RT PROTOCOL NOTE
RT Nebulizer Bronchodilator Protocol Note    There is a bronchodilator order in the chart from a provider indicating to follow the RT Bronchodilator Protocol and there is an Initiate RT Bronchodilator Protocol order as well (see protocol at bottom of note). CXR Findings:  No results found. The findings from the last RT Protocol Assessment were as follows:  Smoking: Chronic pulmonary disease  Respiratory Pattern: Mild dyspnea at rest, irregular pattern, or RR 21-25 bpm  Breath Sounds: Inspiratory and expiratory or bilateral wheezing and/or rhonchi  Cough: Strong, spontaneous, non-productive  Indication for Bronchodilator Therapy: Wheezing associated with pulm disorder  Bronchodilator Assessment Score: 12    Aerosolized bronchodilator medication orders have been revised according to the RT Nebulizer Bronchodilator Protocol below. Respiratory Therapist to perform RT Therapy Protocol Assessment initially then follow the protocol. Repeat RT Therapy Protocol Assessment PRN for score 0-3 or on second treatment, BID, and PRN for scores above 3. No Indications - adjust the frequency to every 6 hours PRN wheezing or bronchospasm, if no treatments needed after 48 hours then discontinue using Per Protocol order mode. If indication present, adjust the RT bronchodilator orders based on the Bronchodilator Assessment Score as indicated below. If a patient is on this medication at home then do not decrease Frequency below that used at home. 0-3 - enter or revise RT bronchodilator order(s) to equivalent RT Bronchodilator order with Frequency of every 4 hours PRN for wheezing or increased work of breathing using Per Protocol order mode. 4-6 - enter or revise RT Bronchodilator order(s) to two equivalent RT bronchodilator orders with one order with BID Frequency and one order with Frequency of every 4 hours PRN wheezing or increased work of breathing using Per Protocol order mode.          7-10 - enter or revise RT Bronchodilator order(s) to two equivalent RT bronchodilator orders with one order with TID Frequency and one order with Frequency of every 4 hours PRN wheezing or increased work of breathing using Per Protocol order mode. 11-13 - enter or revise RT Bronchodilator order(s) to one equivalent RT bronchodilator order with QID Frequency and an Albuterol order with Frequency of every 4 hours PRN wheezing or increased work of breathing using Per Protocol order mode. Greater than 13 - enter or revise RT Bronchodilator order(s) to one equivalent RT bronchodilator order with every 4 hours Frequency and an Albuterol order with Frequency of every 2 hours PRN wheezing or increased work of breathing using Per Protocol order mode. RT to enter RT Home Evaluation for COPD & MDI Assessment order using Per Protocol order mode.     Electronically signed by Yanira Eaton RCP on 2/21/2022 at 8:53 PM

## 2022-02-23 LAB
ANION GAP SERPL CALCULATED.3IONS-SCNC: 9 MEQ/L (ref 8–16)
BASOPHILS # BLD: 0.2 %
BASOPHILS ABSOLUTE: 0 THOU/MM3 (ref 0–0.1)
BUN BLDV-MCNC: 17 MG/DL (ref 7–22)
CALCIUM SERPL-MCNC: 8.8 MG/DL (ref 8.5–10.5)
CHLORIDE BLD-SCNC: 98 MEQ/L (ref 98–111)
CO2: 29 MEQ/L (ref 23–33)
CREAT SERPL-MCNC: 0.5 MG/DL (ref 0.4–1.2)
EOSINOPHIL # BLD: 0 %
EOSINOPHILS ABSOLUTE: 0 THOU/MM3 (ref 0–0.4)
ERYTHROCYTE [DISTWIDTH] IN BLOOD BY AUTOMATED COUNT: 13.8 % (ref 11.5–14.5)
ERYTHROCYTE [DISTWIDTH] IN BLOOD BY AUTOMATED COUNT: 48.5 FL (ref 35–45)
GFR SERPL CREATININE-BSD FRML MDRD: > 90 ML/MIN/1.73M2
GLUCOSE BLD-MCNC: 195 MG/DL (ref 70–108)
GLUCOSE BLD-MCNC: 195 MG/DL (ref 70–108)
GLUCOSE BLD-MCNC: 232 MG/DL (ref 70–108)
GLUCOSE BLD-MCNC: 258 MG/DL (ref 70–108)
GLUCOSE BLD-MCNC: 301 MG/DL (ref 70–108)
HCT VFR BLD CALC: 50.6 % (ref 37–47)
HEMOGLOBIN: 16.1 GM/DL (ref 12–16)
IMMATURE GRANS (ABS): 0.19 THOU/MM3 (ref 0–0.07)
IMMATURE GRANULOCYTES: 0.8 %
LYMPHOCYTES # BLD: 8.1 %
LYMPHOCYTES ABSOLUTE: 2 THOU/MM3 (ref 1–4.8)
MCH RBC QN AUTO: 30.4 PG (ref 26–33)
MCHC RBC AUTO-ENTMCNC: 31.8 GM/DL (ref 32.2–35.5)
MCV RBC AUTO: 95.7 FL (ref 81–99)
MONOCYTES # BLD: 3.3 %
MONOCYTES ABSOLUTE: 0.8 THOU/MM3 (ref 0.4–1.3)
NUCLEATED RED BLOOD CELLS: 0 /100 WBC
PLATELET # BLD: 321 THOU/MM3 (ref 130–400)
PMV BLD AUTO: 9.8 FL (ref 9.4–12.4)
POTASSIUM SERPL-SCNC: 5.2 MEQ/L (ref 3.5–5.2)
RBC # BLD: 5.29 MILL/MM3 (ref 4.2–5.4)
SEG NEUTROPHILS: 87.6 %
SEGMENTED NEUTROPHILS ABSOLUTE COUNT: 21.2 THOU/MM3 (ref 1.8–7.7)
SODIUM BLD-SCNC: 136 MEQ/L (ref 135–145)
WBC # BLD: 24.2 THOU/MM3 (ref 4.8–10.8)

## 2022-02-23 PROCEDURE — 6370000000 HC RX 637 (ALT 250 FOR IP)

## 2022-02-23 PROCEDURE — 6360000002 HC RX W HCPCS: Performed by: NURSE PRACTITIONER

## 2022-02-23 PROCEDURE — 94667 MNPJ CHEST WALL 1ST: CPT

## 2022-02-23 PROCEDURE — 94669 MECHANICAL CHEST WALL OSCILL: CPT

## 2022-02-23 PROCEDURE — 6370000000 HC RX 637 (ALT 250 FOR IP): Performed by: INTERNAL MEDICINE

## 2022-02-23 PROCEDURE — 2580000003 HC RX 258

## 2022-02-23 PROCEDURE — 6370000000 HC RX 637 (ALT 250 FOR IP): Performed by: NURSE PRACTITIONER

## 2022-02-23 PROCEDURE — 1200000000 HC SEMI PRIVATE

## 2022-02-23 PROCEDURE — C1751 CATH, INF, PER/CENT/MIDLINE: HCPCS

## 2022-02-23 PROCEDURE — 2700000000 HC OXYGEN THERAPY PER DAY

## 2022-02-23 PROCEDURE — 99232 SBSQ HOSP IP/OBS MODERATE 35: CPT | Performed by: INTERNAL MEDICINE

## 2022-02-23 PROCEDURE — 80048 BASIC METABOLIC PNL TOTAL CA: CPT

## 2022-02-23 PROCEDURE — 2580000003 HC RX 258: Performed by: INTERNAL MEDICINE

## 2022-02-23 PROCEDURE — 36415 COLL VENOUS BLD VENIPUNCTURE: CPT

## 2022-02-23 PROCEDURE — 76937 US GUIDE VASCULAR ACCESS: CPT

## 2022-02-23 PROCEDURE — 94640 AIRWAY INHALATION TREATMENT: CPT

## 2022-02-23 PROCEDURE — 94760 N-INVAS EAR/PLS OXIMETRY 1: CPT

## 2022-02-23 PROCEDURE — APPSS30 APP SPLIT SHARED TIME 16-30 MINUTES: Performed by: NURSE PRACTITIONER

## 2022-02-23 PROCEDURE — 6360000002 HC RX W HCPCS: Performed by: PHYSICIAN ASSISTANT

## 2022-02-23 PROCEDURE — 6360000002 HC RX W HCPCS

## 2022-02-23 PROCEDURE — 97530 THERAPEUTIC ACTIVITIES: CPT

## 2022-02-23 PROCEDURE — 82948 REAGENT STRIP/BLOOD GLUCOSE: CPT

## 2022-02-23 PROCEDURE — 97110 THERAPEUTIC EXERCISES: CPT

## 2022-02-23 PROCEDURE — 6360000002 HC RX W HCPCS: Performed by: INTERNAL MEDICINE

## 2022-02-23 PROCEDURE — 85025 COMPLETE CBC W/AUTO DIFF WBC: CPT

## 2022-02-23 PROCEDURE — 97116 GAIT TRAINING THERAPY: CPT

## 2022-02-23 RX ORDER — SODIUM POLYSTYRENE SULFONATE 15 G/60ML
15 SUSPENSION ORAL; RECTAL EVERY 6 HOURS
Status: COMPLETED | OUTPATIENT
Start: 2022-02-23 | End: 2022-02-23

## 2022-02-23 RX ORDER — HYDRALAZINE HYDROCHLORIDE 50 MG/1
50 TABLET, FILM COATED ORAL EVERY 8 HOURS SCHEDULED
Status: DISCONTINUED | OUTPATIENT
Start: 2022-02-23 | End: 2022-02-25 | Stop reason: HOSPADM

## 2022-02-23 RX ORDER — ALBUTEROL SULFATE 2.5 MG/3ML
2.5 SOLUTION RESPIRATORY (INHALATION) 4 TIMES DAILY
Status: DISCONTINUED | OUTPATIENT
Start: 2022-02-23 | End: 2022-02-24

## 2022-02-23 RX ORDER — HYDRALAZINE HYDROCHLORIDE 20 MG/ML
10 INJECTION INTRAMUSCULAR; INTRAVENOUS EVERY 6 HOURS PRN
Status: DISCONTINUED | OUTPATIENT
Start: 2022-02-23 | End: 2022-02-25 | Stop reason: HOSPADM

## 2022-02-23 RX ORDER — INSULIN GLARGINE 100 [IU]/ML
20 INJECTION, SOLUTION SUBCUTANEOUS DAILY
Status: DISCONTINUED | OUTPATIENT
Start: 2022-02-23 | End: 2022-02-25 | Stop reason: HOSPADM

## 2022-02-23 RX ADMIN — HYDRALAZINE HYDROCHLORIDE 10 MG: 20 INJECTION INTRAMUSCULAR; INTRAVENOUS at 17:56

## 2022-02-23 RX ADMIN — GUAIFENESIN 600 MG: 600 TABLET, EXTENDED RELEASE ORAL at 08:37

## 2022-02-23 RX ADMIN — GABAPENTIN 800 MG: 400 CAPSULE ORAL at 08:38

## 2022-02-23 RX ADMIN — IPRATROPIUM BROMIDE AND ALBUTEROL SULFATE 1 AMPULE: .5; 3 SOLUTION RESPIRATORY (INHALATION) at 14:51

## 2022-02-23 RX ADMIN — ASPIRIN 81 MG CHEWABLE TABLET 81 MG: 81 TABLET CHEWABLE at 08:42

## 2022-02-23 RX ADMIN — HYDRALAZINE HYDROCHLORIDE 50 MG: 50 TABLET, FILM COATED ORAL at 23:09

## 2022-02-23 RX ADMIN — PANTOPRAZOLE SODIUM 40 MG: 40 TABLET, DELAYED RELEASE ORAL at 04:38

## 2022-02-23 RX ADMIN — SODIUM CHLORIDE, PRESERVATIVE FREE 10 ML: 5 INJECTION INTRAVENOUS at 21:00

## 2022-02-23 RX ADMIN — DILTIAZEM HYDROCHLORIDE 30 MG: 30 TABLET, FILM COATED ORAL at 15:34

## 2022-02-23 RX ADMIN — INSULIN GLARGINE 20 UNITS: 100 INJECTION, SOLUTION SUBCUTANEOUS at 11:36

## 2022-02-23 RX ADMIN — DILTIAZEM HYDROCHLORIDE 30 MG: 30 TABLET, FILM COATED ORAL at 08:38

## 2022-02-23 RX ADMIN — AMITRIPTYLINE HYDROCHLORIDE 10 MG: 10 TABLET, FILM COATED ORAL at 20:35

## 2022-02-23 RX ADMIN — ATORVASTATIN CALCIUM 40 MG: 40 TABLET, FILM COATED ORAL at 20:35

## 2022-02-23 RX ADMIN — LISINOPRIL 20 MG: 20 TABLET ORAL at 08:38

## 2022-02-23 RX ADMIN — GABAPENTIN 800 MG: 400 CAPSULE ORAL at 15:35

## 2022-02-23 RX ADMIN — ENOXAPARIN SODIUM 60 MG: 100 INJECTION SUBCUTANEOUS at 11:39

## 2022-02-23 RX ADMIN — METHYLPREDNISOLONE SODIUM SUCCINATE 40 MG: 40 INJECTION, POWDER, FOR SOLUTION INTRAMUSCULAR; INTRAVENOUS at 00:41

## 2022-02-23 RX ADMIN — SODIUM POLYSTYRENE SULFONATE 15 G: 15 SUSPENSION ORAL; RECTAL at 11:36

## 2022-02-23 RX ADMIN — ALBUTEROL SULFATE 2.5 MG: 2.5 SOLUTION RESPIRATORY (INHALATION) at 20:14

## 2022-02-23 RX ADMIN — SODIUM POLYSTYRENE SULFONATE 15 G: 15 SUSPENSION ORAL; RECTAL at 15:35

## 2022-02-23 RX ADMIN — LISINOPRIL 20 MG: 20 TABLET ORAL at 20:35

## 2022-02-23 RX ADMIN — AZITHROMYCIN DIHYDRATE 500 MG: 500 INJECTION, POWDER, LYOPHILIZED, FOR SOLUTION INTRAVENOUS at 22:09

## 2022-02-23 RX ADMIN — METOPROLOL TARTRATE 50 MG: 50 TABLET, FILM COATED ORAL at 20:35

## 2022-02-23 RX ADMIN — ENOXAPARIN SODIUM 60 MG: 100 INJECTION SUBCUTANEOUS at 00:40

## 2022-02-23 RX ADMIN — SODIUM POLYSTYRENE SULFONATE 15 G: 15 SUSPENSION ORAL; RECTAL at 23:09

## 2022-02-23 RX ADMIN — OLANZAPINE 10 MG: 10 TABLET, FILM COATED ORAL at 20:35

## 2022-02-23 RX ADMIN — PREDNISONE 40 MG: 20 TABLET ORAL at 08:37

## 2022-02-23 RX ADMIN — HYDRALAZINE HYDROCHLORIDE 50 MG: 50 TABLET, FILM COATED ORAL at 15:34

## 2022-02-23 RX ADMIN — IPRATROPIUM BROMIDE AND ALBUTEROL SULFATE 1 AMPULE: .5; 3 SOLUTION RESPIRATORY (INHALATION) at 07:30

## 2022-02-23 RX ADMIN — BUDESONIDE 500 MCG: 0.5 INHALANT RESPIRATORY (INHALATION) at 07:30

## 2022-02-23 RX ADMIN — BUDESONIDE 500 MCG: 0.5 INHALANT RESPIRATORY (INHALATION) at 20:14

## 2022-02-23 RX ADMIN — GUAIFENESIN 600 MG: 600 TABLET, EXTENDED RELEASE ORAL at 20:35

## 2022-02-23 RX ADMIN — DILTIAZEM HYDROCHLORIDE 30 MG: 30 TABLET, FILM COATED ORAL at 20:35

## 2022-02-23 RX ADMIN — GABAPENTIN 800 MG: 400 CAPSULE ORAL at 20:35

## 2022-02-23 RX ADMIN — CEFTRIAXONE SODIUM 2000 MG: 2 INJECTION, POWDER, FOR SOLUTION INTRAMUSCULAR; INTRAVENOUS at 21:27

## 2022-02-23 RX ADMIN — METOPROLOL TARTRATE 50 MG: 50 TABLET, FILM COATED ORAL at 08:37

## 2022-02-23 RX ADMIN — INSULIN GLARGINE 60 UNITS: 100 INJECTION, SOLUTION SUBCUTANEOUS at 20:35

## 2022-02-23 NOTE — FLOWSHEET NOTE
[place] [address] PROGRESS NOTE      Patient: Manolo López  Room #: 1Q-84/928-M            YOB: 1980  Age: 37 y.o. Gender: female            Admit Date & Time: 2/18/2022  8:17 PM    Assessment:  37year old female is experiencing difficulty breathing. She wants to go home, but the doctor has told her that she will not be able to go until her oxygen is at a good level. She shared that she has no spiritual home but that she was open to prayer for strength and being able to go home soon. Interventions:  I offered space for reflection on emotions and coping systems. Stephon Rausch was not able to identify any coping strategies. She was open to a prayer. Outcomes:  Stephon Rausch expressed gratitude for the prayer. Plan:    1. Continue to offer space for reflection, connection and prayer.     Electronically signed by Lexa Yen on 2/23/2022 at 4:45 PM.  [facility]  [phone]

## 2022-02-23 NOTE — PROGRESS NOTES
Midline insertion Procedure Note    Luisa Marie   Admitted- 2/18/2022  8:17 PM  Admission diagnosis- Acute respiratory failure with hypoxia ([facility] [address] [J96.01]  Pneumonia due to infectious organism, unspecified laterality, unspecified part of lung [J18.9]      Attending Physician- Alexi Zavala MD  Ordering Physician-same  Indication for Insertion: Poor Vascular Access    Catheter Insertion Date- 2/23/2022   Catheter Brand-ARROW  Lot Number- [id number]  Gauge-5  Lumen-dual    Insertion Site- [facility] Basilic  Vein Diameter- 0.65 cm  Catheter Length- 15 cm  Internal Length- 15 cm  Exposed Catheter Length- 0cm   Midline Tip Terminates in the Axillary- Yes  Upper Arm Circumference- 44cm  Easy insertion- Yes  Able to Aspirate blood- Yes  Easy Flush- Yes    Midline insertion successful- Yes  Ultrasound- yes    Okay To Use Midline- Yes    Electronically signed by Mo Bynum, RN, RN on 2/23/2022 at 1:48 PM

## 2022-02-23 NOTE — PROGRESS NOTES
only able to travel 100 ft   -Reports SOB slowly improving   -Ongoing cough with sputum production   -Less wheezing today  All other systems reviewed  Past Medical History         Diagnosis Date    Anxiety     Depression     Diabetes mellitus ([facility] [address]     Hyperlipidemia     Hypertension       Past Surgical History           Procedure Laterality Date    ABSCESS DRAINAGE       Diet    ADULT DIET; Regular; 5 carb choices (75 gm/meal)  Allergies    Codeine  Social History     Social History     Socioeconomic History    Marital status: Single     Spouse name: Not on file    Number of children: Not on file    Years of education: Not on file    Highest education level: Not on file   Occupational History    Not on file   Tobacco Use    Smoking status: Current Every Day Smoker     Packs/day: 1.00     Years: 15.00     Pack years: 15.00     Types: Cigarettes    Smokeless tobacco: Never Used   Substance and Sexual Activity    Alcohol use: No    Drug use: No    Sexual activity: Yes     Partners: Male   Other Topics Concern    Not on file   Social History Narrative    Not on file     Social Determinants of Health     Financial Resource Strain:     Difficulty of Paying Living Expenses: Not on file   Food Insecurity:     Worried About Running Out of Food in the Last Year: Not on file    Luis Felipe of Food in the Last Year: Not on file   Transportation Needs:     Lack of Transportation (Medical): Not on file    Lack of Transportation (Non-Medical):  Not on file   Physical Activity:     Days of Exercise per Week: Not on file    Minutes of Exercise per Session: Not on file   Stress:     Feeling of Stress : Not on file   Social Connections:     Frequency of Communication with Friends and Family: Not on file    Frequency of Social Gatherings with Friends and Family: Not on file    Attends Advent Services: Not on file    Active Member of Clubs or Organizations: Not on file    Attends Club or Organization Meetings: Not on file    Marital Status: Not on file   Intimate Partner Violence:     Fear of Current or Ex-Partner: Not on file    Emotionally Abused: Not on file    Physically Abused: Not on file    Sexually Abused: Not on file   Housing Stability:     Unable to Pay for Housing in the Last Year: Not on file    Number of Places Lived in the Last Year: Not on file    Unstable Housing in the Last Year: Not on file     Family History          Problem Relation Age of Onset    High Blood Pressure Mother     Heart Disease Mother     High Cholesterol Mother     Depression Mother     Stroke Mother     Diabetes Maternal Aunt     High Blood Pressure Maternal Aunt     High Cholesterol Maternal Aunt      Sleep History     No history but high suspicion of JACIEL per history   Meds    Current Medications    sodium polystyrene  15 g Oral Q6H    hydrALAZINE  50 mg Oral 3 times per day    insulin glargine  20 Units SubCUTAneous Daily    predniSONE  40 mg Oral Daily    budesonide  0.5 mg Nebulization BID    guaiFENesin  600 mg Oral BID    amitriptyline  10 mg Oral Nightly    aspirin  81 mg Oral Daily    atorvastatin  40 mg Oral Nightly    dilTIAZem  30 mg Oral TID    gabapentin  800 mg Oral TID    OLANZapine  10 mg Oral Nightly    ipratropium-albuterol  1 ampule Inhalation Q4H WA    azithromycin  500 mg IntraVENous Q24H    sodium chloride flush  10 mL IntraVENous 2 times per day    pantoprazole  40 mg Oral QAM AC    enoxaparin  60 mg SubCUTAneous Q12H    insulin glargine  60 Units SubCUTAneous Nightly    insulin lispro  0-18 Units SubCUTAneous TID WC    insulin lispro  0-9 Units SubCUTAneous Nightly    cefTRIAXone (ROCEPHIN) IV  2,000 mg IntraVENous Q24H    lisinopril  20 mg Oral BID    metoprolol  50 mg Oral BID    nicotine  1 patch TransDERmal Daily     hydrALAZINE, albuterol, melatonin, sodium chloride flush, sodium chloride, ondansetron **OR** ondansetron, polyethylene glycol, acetaminophen **OR** acetaminophen, glucose, glucagon (rDNA), dextrose, dextrose bolus (hypoglycemia) **OR** dextrose bolus (hypoglycemia)  IV Drips/Infusions   sodium chloride      dextrose      sodium chloride 125 mL/hr at 02/21/22 1325     Vitals    Vitals    height is 5' (1.524 m) and weight is 291 lb (132 kg). Her oral temperature is 98.4 °F (36.9 °C). Her blood pressure is 180/94 (abnormal) and her pulse is 97. Her respiration is 18 and oxygen saturation is 91%. O2 Flow Rate (L/min): 5 L/min  I/O    Intake/Output Summary (Last 24 hours) at 2/23/2022 1632  Last data filed at 2/23/2022 1406  Gross per 24 hour   Intake 900 ml   Output --   Net 900 ml     Patient Vitals for the past 96 hrs (Last 3 readings):   Weight   02/23/22 0552 291 lb (132 kg)   02/22/22 0429 290 lb 8 oz (131.8 kg)   02/21/22 0600 290 lb 5.5 oz (131.7 kg)     Exam   Physical Exam   Constitutional: No distress on O2 per NC. Patient appears obese BMI 56  Head: Normocephalic and atraumatic. Mouth/Throat: Oropharynx is clear and moist.  No oral thrush. Eyes: Conjunctivae are normal. Pupils are equal, round. No scleral icterus. Neck: Neck supple. No tracheal deviation present. Cardiovascular: S1 and S2 with no murmur. BLE edema  Pulmonary/Chest: Normal effort with bilateral air entry, clear breath sounds. No stridor. No respiratory distress. Patient exhibits no tenderness. Abdominal: Soft. Bowel sounds audible. No distension or tenderness to palp.    Musculoskeletal: Moves all extremities  Neurological: Patient is alert and and follows simple commands    Labs   ABG  Lab Results   Component Value Date    PH 7.37 02/19/2022    PO2 54 02/19/2022    PCO2 54 02/19/2022    HCO3 31 02/19/2022    O2SAT 86 02/19/2022     Lab Results   Component Value Date    IFIO2 5 02/19/2022     CBC  Recent Labs     02/22/22  1410 02/23/22  1052   WBC 20.8* 24.2*   RBC 5.36 5.29   HGB 16.4* 16.1*   HCT 54.6* 50.6*   .9* 95.7   MCH 30.6 30.4   MCHC 30.0* 31.8*  321   MPV 10.2 9.8      BMP  Recent Labs     02/22/22  1410 02/23/22  1052    136   K 5.6* 5.2    98   CO2 25 29   BUN 24* 17   CREATININE 0.7 0.5   GLUCOSE 289* 232*   CALCIUM 8.4* 8.8     LFT  No results for input(s): AST, ALT, ALB, BILITOT, ALKPHOS, LIPASE in the last 72 hours. Invalid input(s): AMYLASE  TROP  Lab Results   Component Value Date    TROPONINT < 0.010 02/18/2022    TROPONINT < 0.010 08/08/2018    TROPONINT < 0.010 07/11/2017     BNP  Lab Results   Component Value Date    PROBNP 2561.0 02/18/2022    PROBNP 10.2 07/11/2017     D-Dimer  Lab Results   Component Value Date    DDIMER 377.00 08/08/2018     Lactic Acid  No results for input(s): LACTA in the last 72 hours. INR  No results for input(s): INR, PROTIME in the last 72 hours. PTT  No results for input(s): APTT in the last 72 hours. Glucose  Recent Labs     02/23/22  0845 02/23/22  1041 02/23/22  1536   POCGLU 195* 195* 301*     UA No results for input(s): SPECGRAV, PHUR, COLORU, CLARITYU, MUCUS, PROTEINU, BLOODU, RBCUA, WBCUA, BACTERIA, NITRU, GLUCOSEU, BILIRUBINUR, UROBILINOGEN, KETUA, LABCAST, LABCASTTY, AMORPHOS in the last 72 hours. Invalid input(s): CRYSTALS. PFTs   No records  Echo     Summary   Technically difficult examination. Left ventricular size and systolic function is normal. Ejection fraction   was estimated at 55-60%. LV wall thickness is within normal limits and   there are no obvious wall motion abnormalities. The right ventricular size appears normal with normal systolic function   and wall thickness. Cultures    Procalcitonin  Lab Results   Component Value Date    PROCAL 0.12 02/18/2022    PROCAL 0.05 08/08/2018    PROCAL 0.03 07/11/2017     Radiology    CXR    XR CHEST PORTABLE   2/18/2022   FINDINGS:  There is mild cardiomegaly. .The mediastinum is not widened. There is slightly increased density throughout both lung fields. There are no effusions. . The pulmonary vascularity is increased. No suspicious osseous lesions are present. Impression:  1. Cardiomegaly and possible mild congestive heart failure. 2.. Slightly increased density throughout both lung fields. CT Scans  CTA   2/18/2022  Findings: Uniform density through the thyroid gland. Lymphoid hyperplasia in the superior mediastinum, hilar regions and infracarinal tissues. Adenopathy may be reactive to scattered atelectasis and infiltrate through the lungs bilaterally. There is groundglass opacification centrally in the upper lobes with predominantly atelectasis and consolidative infiltrate scattered through the lower lobes and right middle lobe. Minimal airspace opacification is noted to the lingula. There is no evidence of pulmonary embolism or aortic dissection. Small hernia is evident with moderate volume of air seen into the distal esophagus suggesting underlying gastroesophageal reflux disease, correlate clinically. The included portions of the upper abdomen are notable for hepatomegaly. Axillary lymph nodes are benign appearing. The patient's breast tissues are that of scattered fibroglandular nature. For healthcare maintenance purposes she should be reminded that the [employer] of [employer] recommends screening mammography commencing at the age of 37. Impression:  Bilateral pneumonia with significant reactive adenopathy. Follow-up recommended to ensure complete clearing of via chest radiography and subsequent CT examination across the next 6-8 weeks.          (See actual reports for details)    Assessment   -Acute respiratory failure with hypoxia 2/2 COPD exacerbation  -COPD exacerbation  -Right middle lobe and lingular infiltrate/atelectasis  -Tobacco abuse  -Obesity with high suspicion for obstructive sleep apnea  -elevated ProBNP 2561, LVEF 34-32 possible diastolic dysfunction  -Hypertension  -Diabetes mellitus type 2  Recommendations   -Monitor SpO2 wean supplemental O2 to maintain SpO2 >90%  -Agree with empiric ATB's Azithromycin and Rocephin  -Solumedrol 40 gm IV BID transition to PO prednisone 40 mg in AM   -NRT 14 mg per Day advised to continue efforts of tobacco cessation  -HTN per primary recommend monitoring volume status possible component of volume overload contributing to hypoxia as ProBNP is elevated and weight aguilar been trending up  -Duonebs Q 4 hrs WA   -Pulmicort 500 mcg via neb BID   -Guaifenesin 600 mg PO BID   -Pulmonary hygiene with IS and acapella   -Home O2 eval tomorrow   -Will need follow-up in Pulmonary clinic in 3 months with Full PFT and Ct chest to follow lingula infiltrate    Case discussed with nurse and patient/family. Questions and concerns addressed. Meds and Orders reviewed. Electronically signed by     ROBBIE Yeager - CNP on 2/23/2022 at 4:32 PM    Vitals:    02/23/22 1532   BP: (!) 180/94   Pulse: 97   Resp: 18   Temp: 98.4 °F (36.9 °C)   SpO2: 91%     Stable, no distress  Require 5 lpm at res and 8 lpm with activity  PSG with split night protocol upon DC  Start Stiolto    Patient seen and examined independently by me. Above discussed and I agree with  CNP note Also see my additional comments. Labs, cultures, and radiographs when available were reviewed. Changes were made in the orders as necessary. I discussed patient concerns with Althea'cesar [email or web address] and instructions were given. Respiratory care issues addressed. Please see our orders for the updated patient care plan.     Electronically signed by     Ceci Arevalo MD on 2/23/2022 at 6:42 PM

## 2022-02-23 NOTE — PROGRESS NOTES
Hospitalist Progress Note  [facility]       Patient: Vivian Marquis  Unit/Bed: 3Z-94/888-B  YOB: 1980  MRN: [id number]  Acct: [de-identified]   AdmittingDiagnosis: Acute respiratory failure with hypoxia ([facility] [address] [J96.01]  Pneumonia due to infectious organism, unspecified laterality, unspecified part of lung [J18.9]  Admit Date: 2/18/2022  Hospital Day: 5    Subjective:    Continue to wheeze and requiring oxygen 6 L by nasal cannula, denies any chest pain or palpitations occasional cough not much expectoration    Objective:   BP (!) 160/145   Pulse 80   Temp 98.4 °F (36.9 °C) (Oral)   Resp 26   Ht 5' (1.524 m)   Wt 291 lb (132 kg)   SpO2 (!) 79% Comment: patient placed back on NC  BMI 56.83 kg/m²     Intake/Output Summary (Last 24 hours) at 2/23/2022 1048  Last data filed at 2/23/2022 0052  Gross per 24 hour   Intake 1100 ml   Output --   Net 1100 ml     General appearance: A&O x3, Not ill or toxic, mild distress noted   HEENT:  JUNI  EOM intact. Neck: Supple, with full range of motion. No jugular venous distention. Trachea midline. Respiratory:   NL A/E bilat with occasional wheeze   Cardiovascular:  normal S1/S2 with no murmurs/gallops  Abdomen: obese, Soft, non-tender, non-distended, no rigidity or peritoneal signs  Musculoskeletal: NL symmetrical A/PROM bilat U/L extremities   Skin: No rashes. No edema  Neurologic:  CN II-XII intact. NL symmetrical reflexes. NL gait and stance. NL Cerebellar exam. Power 5/5 all muscle groups U/L extremities.  Toes downgoing  Capillary Refill: Brisk,< 3 seconds   Peripheral Pulses: +2 palpable, equal bilaterally     DVT Prophylaxis: Lovenox      Data:  CBC:   Lab Results   Component Value Date    WBC 20.8 02/22/2022    HGB 16.4 02/22/2022    HCT 54.6 02/22/2022    .9 02/22/2022     02/22/2022     BMP:   Lab Results   Component Value Date     02/22/2022    K 5.6 02/22/2022    K 5.0 02/19/2022     02/22/2022    CO2 25 02/22/2022    PHOS 2.8 07/11/2014    BUN 24 02/22/2022    CREATININE 0.7 02/22/2022    CALCIUM 8.4 02/22/2022     ABGs:   Lab Results   Component Value Date    PH 7.37 02/19/2022    PCO2 54 02/19/2022    PO2 54 02/19/2022    HCO3 31 02/19/2022    O2SAT 86 02/19/2022     Troponin:   Lab Results   Component Value Date    TROPONINI <0.006 07/14/2013    TROPONINI <0.006 04/19/2012      LFTs   Lab Results   Component Value Date    AST 33 02/20/2022    ALT 54 02/20/2022    BILITOT 0.2 02/20/2022    BILITOT NEGATIVE 12/17/2011    ALKPHOS 151 02/20/2022          Imaging   ECHO Complete 2D W Doppler W Color    Result Date: 2/19/2022  Transthoracic Echocardiography Report (TTE)  Demographics   Patient Name  Cordelia Camejo  Gender             Female                S   MR #          [id number]    Race                                             Ethnicity   Account #     [de-identified]    Room Number        5333   Accession     [id number]   Date of Study      02/19/2022  Number   Date of Birth 1980   Referring          [facility]                             Physician          Taz Bautista MD   Age           37 year(s)   Sonographer        ALVINA Solo, JAMARI,                                                RDMS, RVT                              Interpreting       Yadira Pettit MD                             Physician  Procedure Type of Study   TTE procedure:ECHOCARDIOGRAM COMPLETE 2D W DOPPLER W COLOR. Procedure Date Date: 02/19/2022 Start: 09:31 AM Study Location: Bedside Technical Quality: Limited visualization due to body habitus. Indications:Acute respiratory failure. Additional Medical History:acute respiratory failure, abdominal pain, tachycardia, sepsis, diabetes, hypertension, morbid obesity, hyperlipidemia Patient Status: Routine Height: 60 inches Weight: 270.01 pounds BSA: 2.12 m^2 BMI: 52.73 kg/m^2 BP: 173/84 mmHg  Conclusions   Summary  Technically difficult examination.   Left ventricular size and systolic function is normal. Ejection fraction  was estimated at 55-60%. LV wall thickness is within normal limits and  there are no obvious wall motion abnormalities. The right ventricular size appears normal with normal systolic function  and wall thickness. Signature   ----------------------------------------------------------------  Electronically signed by Diego Cohen MD (Interpreting  physician) on 02/19/2022 at 01:11 PM  ----------------------------------------------------------------   Findings   Mitral Valve  The mitral valve structure is normal with normal leaflet separation. DOPPLER: The transmitral velocity was within the normal range with no  evidence for mitral stenosis. There was no evidence of mitral  regurgitation. Aortic Valve  The aortic valve appears trileaflet with normal thickness and leaflet  excursion. DOPPLER: Transaortic velocity was within the normal range with  no evidence of aortic stenosis. There was no evidence of aortic  regurgitation. Tricuspid Valve  The tricuspid valve structure is normal with normal leaflet separation. DOPPLER: There is no evidence of tricuspid stenosis. There was no evidence  of tricuspid regurgitation. Pulmonic Valve  The pulmonic valve was not well visualized . Left Atrium  Left atrial size is normal.   Left Ventricle  Left ventricular size and systolic function is normal. Ejection fraction  was estimated at 55-60%. LV wall thickness is within normal limits and  there are no obvious wall motion abnormalities. Right Atrium  Right atrial size was normal.   Right Ventricle  The right ventricular size appears normal with normal systolic function  and wall thickness. Pericardial Effusion  The pericardium appears normal with no evidence of a pericardial effusion. Pleural Effusion  No evidence of pleural effusion. Aorta / Great Vessels  -Aortic root dimension within normal limits.   -IVC size is within normal limits with normal respiratory phasic changes. M-Mode/2D Measurements & Calculations   LV Diastolic      LV Systolic Dimension: 2.7 cm    LA Dimension: 3.9 cmLA  Dimension: 3.7 cm LV Volume Diastolic: 02.3 ml     Area: 12.1 cm^2  LV FS:27 %        LV Volume Systolic: 27 ml  LV PW Diastolic:  LV EDV/LV EDV Index: 58.1 ml/27  1.3 cm            m^2LV ESV/LV ESV Index: 27 SC/19  Septum Diastolic: m^2                              RV Diastolic Dimension:  1.3 cm            EF Calculated: 53.5 %            1.8 cm                                                      Ascending Aorta: 2.4 cm                                                     LA volume/Index: 26.8                                                     ml /13m^2  Doppler Measurements & Calculations   MV Peak E-Wave: 121 cm/s   AV Peak Velocity: 174  LVOT Peak Velocity: 120  MV Peak A-Wave: 136 cm/s   cm/s                   cm/s  MV E/A Ratio: 0.89         AV Peak Gradient:      LVOT Peak Gradient: 6  MV Peak Gradient: 5.86     12.11 mmHg             mmHg  mmHg                                                    TV Peak E-Wave: 53.6  MV Deceleration Time: 264                         cm/s  msec                             IVRT: 95 msec          TV Peak Gradient: 1.15                                                    mmHg  MV E' Septal Velocity: 7.7                        TR Velocity:244 cm/s  cm/s                       AV DVI (Vmax):0.69     TR Gradient:23.81 mmHg  MV A' Septal Velocity: 9.6                        PV Peak Velocity: 88.7  cm/s                                              cm/s  MV E' Lateral Velocity:                           PV Peak Gradient: 3.15  9.4 cm/s                                          mmHg  MV A' Lateral Velocity:  10.2 cm/s  E/E' septal: 15.71  E/E' lateral: 12.87  [email or web address] [id number]    CTA CHEST W WO CONTRAST    Result Date: 2/18/2022  CTA chest Comparison: None. Findings: Uniform density through the thyroid gland. Lymphoid hyperplasia in the superior mediastinum, hilar regions and infracarinal tissues. Adenopathy may be reactive to scattered atelectasis and infiltrate through the lungs bilaterally. There is groundglass opacification centrally in the upper lobes with predominantly atelectasis and consolidative infiltrate scattered through the lower lobes and right middle lobe. Minimal airspace opacification is noted to the lingula. There is no evidence of pulmonary embolism or aortic dissection. Small hernia is evident with moderate volume of air seen into the distal esophagus suggesting underlying gastroesophageal reflux disease, correlate clinically. The included portions of the upper abdomen are notable for hepatomegaly. Axillary lymph nodes are benign appearing. The patient's breast tissues are that of scattered fibroglandular nature. For healthcare maintenance purposes she should be reminded that the [employer] recommends screening mammography commencing at the age of 37. Impression: Bilateral pneumonia with significant reactive adenopathy. Follow-up recommended to ensure complete clearing of via chest radiography and subsequent CT examination across the next 6-8 weeks. This document has been electronically signed by: Helen Castillo MD on 02/18/2022 10:52 PM All CTs at this facility use dose modulation techniques and iterative reconstructions, and/or weight-based dosing when appropriate to reduce radiation to a low as reasonably achievable. XR CHEST PORTABLE    Result Date: 2/18/2022  PROCEDURE: XR CHEST PORTABLE CLINICAL INFORMATION: SOB. COMPARISON: Chest x-ray dated 23 May 2019. Hannah Maid TECHNIQUE: AP upright view of the chest. FINDINGS: There is mild cardiomegaly. .The mediastinum is not widened. There is slightly increased density throughout both lung fields. There are no effusions. . The pulmonary vascularity is increased. No suspicious osseous lesions are present. 1. Cardiomegaly and possible mild congestive heart failure. 2.. Slightly increased density throughout both lung fields. **This report has been created using voice recognition software. It may contain minor errors which are inherent in voice recognition technology. ** Final report electronically signed by DR Pili Patel on 2/18/2022 9:01 PM      Assessment/Plan:     1.  Acute respiratory failure secondary to #2  Continue oxygen by nasal cannula, incentive spirometry, aggressive bronchodilator treatments,      2.  Community-acquired pneumonia/bilateral  Continue IV antibiotics,  and IV steroids     3.  Tobacco abuse   Encouraged to quit, continue nicotine patch     4.  Hypertension   Under control continue home medications        Electronically signed by Ranjan Casillas MD on 2/23/2022 at 10:48 AM    Rounding Hospitalist

## 2022-02-23 NOTE — PROGRESS NOTES
[address] [address]  INPATIENT PHYSICAL THERAPY  DAILY NOTE  [facility] 7K - 6E-02/411-N    Time In: 9449  Time Out: 1024  Timed Code Treatment Minutes: 29 Minutes  Minutes: 29          Date: 2022  Patient Name: Rula Rascon,  Gender:  female        MRN: [id number]  : 1980  (37 y.o.)     Referring Practitioner: Silvina Perry MD  Diagnosis: Acute respiratory failure with hypoxia  Additional Pertinent Hx: Patient presents with worsening shortness of breath. Family in room to assist with history. Symptoms reportedly began about 4 days prior to admission. She describes worsening dyspnea and increased wheezing over the last few days. No obvious inciting incident noted by patient. She denies any chest pain, fever, chills, cough. She states she does not have increased sputum production at home. She is not on any home O2. She is being treated outpatient for COPD with Symbicort and albuterol inhalers, states she has not seen pulmonology previously and has not had formal PFTs done. Per family, patient has been acting strangely at home with some mild confusion and worsening physical condition. Son states that she has had increased difficulty ambulating throughout the house. Patient also endorsing some diaphoresis at home, but states that her heart rate is typically high but has noticed heart racing the last few days.      Prior Level of Function:  Lives With: Family (With son, daughter and occasionally an 7 month old grandchild)  Type of Home: Apartment  Home Layout: Two level,Bed/Bath upstairs  Home Access: Stairs to enter with rails  Entrance Stairs - Number of Steps: 1 step at the threshhold without a rail; 1 flight of steps to her bedroom and bathroom with 1 rail   Bathroom Shower/Tub: Tub/Shower unit  Bathroom Toilet: Standard  Bathroom Accessibility: Accessible    Receives Help From: Family  ADL Assistance: [facility] [address]: Needs assistance  Homemaking Responsibilities: Yes  Ambulation Assistance: Independent  Transfer Assistance: Independent  Active : No  Additional Comments: Pt was not using any AD for ambulation. She has been needing help from family members to do some of the homemaking secondary to fatigue levels. She is independent with self care. Pt only used an inhaler while at home prn. No supplemental O2 used. Restrictions/Precautions:  Restrictions/Precautions: General Precautions,Fall Risk  Position Activity Restriction  Other position/activity restrictions: O2     SUBJECTIVE: RN approved session, reports that respiratory in room to do home 02 eval with patient. Patient sitting EOB upon arrival, agreed and cooperative for therapy. Required max verbal cues throughout session for correct breathing technique to maximize 02. PAIN: No pain noted. Vitals: Oxygen: 5L initially but Sp02 decreased to low-mid 80's, Respiratory increasing gradually and ended up needing 8L of 02 to maintain 02 at 88% or above. Patient completed multiple standing rest breaks and able to recover to 90-92% while on 8L. Heart Rate: 114 bpm    OBJECTIVE:    Transfers:  Sit to Stand: Stand By Assistance  Stand to Sit:Stand By Assistance    Ambulation:  Contact Guard Assistance  Distance: 100 ft. x1 (multiple standing rest breaks taken throughout distance to recover breathing)  Surface: Level Tile  Device:No Device  Gait Deviations:  Slow Zayda, Decreased Step Length Bilaterally, Decreased Arm Swing, Decreased Trunk Rotation, Decreased Gait Speed, Decreased Heel Strike Bilaterally, Wide Base of Support, Mild Path Deviations and Unsteady Gait  **Would benefit from trialing AD to improve energy conservation and 02 levels. Balance:  Static Standing Balance: Stand By Assistance  Dynamic Standing Balance: Contact Guard Assistance    Exercise:  Patient was guided in 1 set(s) 10 reps of exercise to both lower extremities.   Glut sets, Seated heel/toe raises and Long arc quads.  Exercises were completed for increased independence with functional mobility. Functional Outcome Measures: Not completed       ASSESSMENT:  Assessment: Patient progressing toward established goals. The patient tolerated session fairly well but demonstrates increased verbal cues for correct breathing technique to improve lung expansion and maximize 02. Patient ambulating without AD at [facility] with multiple rest breaks needed to recover 02 to safe levels. Would benefit from trialing AD to improve energy conservation with ambulation. Activity Tolerance:  Patient tolerance of  treatment: good. Limited by decreased endurance. Equipment Recommendations:Equipment Needed: No  Discharge Recommendations: Continue to assess pending progress and Home with Home Health PT  Plan: Times per week: 5x GM  Times per day: Daily  Current Treatment Recommendations: Strengthening,Stair training,Endurance Training,Functional Mobility Training,Gait Training    Patient Education  Patient Education: Plan of Care, Precautions/Restrictions, Transfers, Reviewed Prior Education, Gait, Health Promotion and Wellness Education, Home Safety Education, Energy Conservation, Incentive Spirometer, Pursed Lip Breathing, - Patient Requires Continued Education    Goals:  Patient goals : none stated  Short term goals  Time Frame for Short term goals: by discharge  Short term goal 1: bed mobility with HOB flat, no rails, mod I for increased functional ind  Short term goal 2: sit<>stand from various surfaces with LRD mod I for safe transfers  Short term goal 3: ambulate 48' with LRD mod I and good oxygen saturations for safe household distances  Short term goal 4: navigate 10 steps with LRD mod I and good oxygen saturations for safe enter/exit of home  Long term goals  Time Frame for Long term goals : NA d/t short ELOS    Following session, patient left in safe position with all fall risk precautions in place.

## 2022-02-23 NOTE — PROGRESS NOTES
[address] [facility]  Occupational Therapy  Daily Note  Time:   Time In: 0145  Time Out: 1457  Timed Code Treatment Minutes: 23 Minutes  Minutes: 23          Date: 2022  Patient Name: Nasim Fay,   Gender: female      Room: [facility]/027-A  MRN: [id number]  : 1980  (37 y.o.)  Referring Practitioner: Dr. Igor Real MD  Diagnosis: Acute Respiratory Failure with Hypoxia  Additional Pertinent Hx: Patient presents with worsening shortness of breath. Family in room to assist with history. Symptoms reportedly began about 4 days prior to admission. She describes worsening dyspnea and increased wheezing over the last few days. No obvious inciting incident noted by patient. She denies any chest pain, fever, chills, cough. She states she does not have increased sputum production at home. She is not on any home O2. She is being treated outpatient for COPD with Symbicort and albuterol inhalers, states she has not seen pulmonology previously and has not had formal PFTs done. Per family, patient has been acting strangely at home with some mild confusion and worsening physical condition. Son states that she has had increased difficulty ambulating throughout the house. Patient also endorsing some diaphoresis at home, but states that her heart rate is typically high but has noticed heart racing the last few days. Restrictions/Precautions:  Restrictions/Precautions: General Precautions,Fall Risk  Position Activity Restriction  Other position/activity restrictions: O2     SUBJECTIVE: Pt seated EOB upon arrival, agreeable to OT session. PAIN: Denies    Vitals: Oxygen: Patient on 5L O2 via Nasal Canula  upon arrival to room. Patient O2 sats at 87%. Increased to 6L in prep for mobility with extended time to recover to 90%. Upon activity patient dropping O2 at 84% with short distances. Pt requiring extended rest break(s) to recover.    Heart Rate: 100's    COGNITION: Slow Processing, Decreased Recall, Decreased Insight, Decreased Problem Solving, Decreased Safety Awareness and Impaired Attention    ADL:   No ADL's completed this session. Discussed LB ADL's with dressing- pt stating she cannot complete on own and has no plan for completion after returning home. Discussed LHAE- poor comprehension noted. BALANCE:  Sitting Balance:  Supervision. Standing Balance: Stand By Assistance. BED MOBILITY:  Sit to Supine: Supervision      TRANSFERS:  Sit to Stand:  Stand By Assistance. Stand to Sit: Stand By Assistance. FUNCTIONAL MOBILITY:  Assistive Device: Rolling Walker   Assist Level:  Stand By Assistance. Distance:   Completed functional mobility short distance within pt room at slow pace, no LOB noted. Pt requires lengthy seated rest break after trial of mobility, mod fatigue noted- O2 sats at 97%. ASSESSMENT:     Activity Tolerance:  Patient tolerance of  treatment: fair. Discharge Recommendations: Subacute/skilled nursing facility, Not safe to return home at this time and Inpatient Therapy Stay  Equipment Recommendations: Equipment Needed: Yes  Other: Shower seat; grabbars in the tub/shower  Plan: Times per week: 5x  Specific instructions for Next Treatment: Functional mobility; ADLs and endurance training; Energy conservation techniques applied during routine tasks  Current Treatment Recommendations: Functional Mobility Training,Endurance Training,Self-Care / ADL,Patient/Caregiver Education & Training  Plan Comment: Pt would benefit from continued skilled OT services when medically stable and discharged from Acute. OPOT recommended. Patient Education  Patient Education: ADL's, Energy Conservation, Equipment Education, Home Safety, Importance of Increasing Activity, Assistive Device Safety and Safety with transfers and mobility.     Goals  Short term goals  Time Frame for Short term goals: By discharge  Short term goal 1: Pt will demonstrate functional mobility within the kitchen or bathroom environment without any AD while carrying light objects with SBA to prepare for doing ADLs and light homemaking. Short term goal 2: Pt will complete a spongebath or dressing herself while following energy conservation strategies while keeping O2 saturation > than 90% with SBA and min cues as needed to increase her activity tolerance for ease of doing her morning routine. Short term goal 3: Pt will complete BUE ROM/light resistance exercises x 10 reps each of 5 sets with cues for pursed lip breathing to increase her endurance for ease of doing ADLs and returning to work. Short term goal 4: Pt will describe 3 ways with which she will stop smoking with verbal cues as needed to increase her energy levels and help her lung function and overall health. Following session, patient left in safe position with all fall risk precautions in place.

## 2022-02-23 NOTE — RT PROTOCOL NOTE
RT Inhaler-Nebulizer Bronchodilator Protocol Note    There is a bronchodilator order in the chart from a provider indicating to follow the RT Bronchodilator Protocol and there is an Initiate RT Inhaler-Nebulizer Bronchodilator Protocol order as well (see protocol at bottom of note). CXR Findings:  No results found. The findings from the last RT Protocol Assessment were as follows:   History Pulmonary Disease: Chronic pulmonary disease  Respiratory Pattern: Dyspnea on exertion or RR 21-25 bpm  Breath Sounds: Inspiratory and expiratory or bilateral wheezing and/or rhonchi  Cough: Weak, productive  Indication for Bronchodilator Therapy: Wheezing associated with pulm disorder  Bronchodilator Assessment Score: 12    Aerosolized bronchodilator medication orders have been revised according to the RT Inhaler-Nebulizer Bronchodilator Protocol below. Respiratory Therapist to perform RT Therapy Protocol Assessment initially then follow the protocol. Repeat RT Therapy Protocol Assessment PRN for score 0-3 or on second treatment, BID, and PRN for scores above 3. No Indications - adjust the frequency to every 6 hours PRN wheezing or bronchospasm, if no treatments needed after 48 hours then discontinue using Per Protocol order mode. If indication present, adjust the RT bronchodilator orders based on the Bronchodilator Assessment Score as indicated below. Use Inhaler orders unless patient has one or more of the following: on home nebulizer, not able to hold breath for 10 seconds, is not alert and oriented, cannot activate and use MDI correctly, or respiratory rate 25 breaths per minute or more, then use the equivalent nebulizer order(s) with same Frequency and PRN reasons based on the score. If a patient is on this medication at home then do not decrease Frequency below that used at home.     0-3 - enter or revise RT bronchodilator order(s) to equivalent RT Bronchodilator order with Frequency of every 4 hours PRN for wheezing or increased work of breathing using Per Protocol order mode. 4-6 - enter or revise RT Bronchodilator order(s) to two equivalent RT bronchodilator orders with one order with BID Frequency and one order with Frequency of every 4 hours PRN wheezing or increased work of breathing using Per Protocol order mode. 7-10 - enter or revise RT Bronchodilator order(s) to two equivalent RT bronchodilator orders with one order with TID Frequency and one order with Frequency of every 4 hours PRN wheezing or increased work of breathing using Per Protocol order mode. 11-13 - enter or revise RT Bronchodilator order(s) to one equivalent RT bronchodilator order with QID Frequency and an Albuterol order with Frequency of every 4 hours PRN wheezing or increased work of breathing using Per Protocol order mode. Greater than 13 - enter or revise RT Bronchodilator order(s) to one equivalent RT bronchodilator order with every 4 hours Frequency and an Albuterol order with Frequency of every 2 hours PRN wheezing or increased work of breathing using Per Protocol order mode. RT to enter RT Home Evaluation for COPD & MDI Assessment order using Per Protocol order mode.     Electronically signed by Muriel Funez RCP on 2/23/2022 at 7:45 AM

## 2022-02-23 NOTE — CARE COORDINATION
DISCHARGE PLANNING UPDATE    Barriers to Discharge: Pneumonia/COPD. WBC 24.2; monitor. PICC planned today. Bp 180/s; monitor. Oxygen 5L, IV AB, IVF continued; await final cultures; monitor.  Attending plans to discharge when oxygen weaned to 3L per [facility] report  Discharge Plan: client [facility]; plans home alone independently as PTA; therapy following; home oxygen eval 5L at rest, 8L w activity; monitor oxygen needs; step dad will transport home when medically cleared

## 2022-02-23 NOTE — PROCEDURES
A home oxygen evaluation has been completed. [x]Patient is an inpatient. It is expected that the patient will be discharged within the next 48 hours. Qualified provider to write order for home prescription if patient qualifies. Social service/care managers will arrange for home oxygen. If patient is active, arrange for Home Medical supplier to assess for Oxygen Conserving Device per pulse oximetry. []Patient is an outpatient. Results will be faxed to the ordering provider. Qualified provider to write order for home prescription if patient qualifies and arranges for home oxygen. Patient was is on 5 lpm at rest, writer decreased the oxygen to 3 lpm for 1 minute. SpO2 was 87 % on 3 lpm at rest. Patients SpO2 was below 89% and qualified for home oxygen. Oxygen was applied at 5 lpm via nasal cannula to maintain a SpO2 between 90-92% while at rest. Actual SpO2 was 90 %. Patient can ambulate for exercise flow rate. Patients was ambulated, 100ft SpO2 was 90% on 8 lpm to maintain SpO2 between 90-92% while exercising. If oxygen need is greater than 4 lpm the SpO2 on 4 lpm was 88-89%. Note: For any SpO2 at 15% see policy and procedure for possible qualifications.

## 2022-02-24 ENCOUNTER — APPOINTMENT (OUTPATIENT)
Dept: GENERAL RADIOLOGY | Age: 42
DRG: 139 | End: 2022-02-24
Payer: COMMERCIAL

## 2022-02-24 LAB
ANION GAP SERPL CALCULATED.3IONS-SCNC: 8 MEQ/L (ref 8–16)
BASOPHILS # BLD: 0.3 %
BASOPHILS ABSOLUTE: 0.1 THOU/MM3 (ref 0–0.1)
BLOOD CULTURE, ROUTINE: NORMAL
BLOOD CULTURE, ROUTINE: NORMAL
BUN BLDV-MCNC: 15 MG/DL (ref 7–22)
CALCIUM SERPL-MCNC: 8.5 MG/DL (ref 8.5–10.5)
CHLORIDE BLD-SCNC: 97 MEQ/L (ref 98–111)
CO2: 36 MEQ/L (ref 23–33)
CREAT SERPL-MCNC: 0.4 MG/DL (ref 0.4–1.2)
EOSINOPHIL # BLD: 0.6 %
EOSINOPHILS ABSOLUTE: 0.1 THOU/MM3 (ref 0–0.4)
ERYTHROCYTE [DISTWIDTH] IN BLOOD BY AUTOMATED COUNT: 13.9 % (ref 11.5–14.5)
ERYTHROCYTE [DISTWIDTH] IN BLOOD BY AUTOMATED COUNT: 50 FL (ref 35–45)
GFR SERPL CREATININE-BSD FRML MDRD: > 90 ML/MIN/1.73M2
GLUCOSE BLD-MCNC: 102 MG/DL (ref 70–108)
GLUCOSE BLD-MCNC: 230 MG/DL (ref 70–108)
GLUCOSE BLD-MCNC: 311 MG/DL (ref 70–108)
GLUCOSE BLD-MCNC: 75 MG/DL (ref 70–108)
GLUCOSE BLD-MCNC: 86 MG/DL (ref 70–108)
HCT VFR BLD CALC: 54.4 % (ref 37–47)
HEMOGLOBIN: 16.9 GM/DL (ref 12–16)
IMMATURE GRANS (ABS): 0.16 THOU/MM3 (ref 0–0.07)
IMMATURE GRANULOCYTES: 0.7 %
LYMPHOCYTES # BLD: 19.9 %
LYMPHOCYTES ABSOLUTE: 4.7 THOU/MM3 (ref 1–4.8)
MCH RBC QN AUTO: 30.2 PG (ref 26–33)
MCHC RBC AUTO-ENTMCNC: 31.1 GM/DL (ref 32.2–35.5)
MCV RBC AUTO: 97.1 FL (ref 81–99)
MONOCYTES # BLD: 6.6 %
MONOCYTES ABSOLUTE: 1.6 THOU/MM3 (ref 0.4–1.3)
NUCLEATED RED BLOOD CELLS: 0 /100 WBC
PLATELET # BLD: 370 THOU/MM3 (ref 130–400)
PMV BLD AUTO: 10 FL (ref 9.4–12.4)
POTASSIUM SERPL-SCNC: 3.9 MEQ/L (ref 3.5–5.2)
RBC # BLD: 5.6 MILL/MM3 (ref 4.2–5.4)
SEG NEUTROPHILS: 71.9 %
SEGMENTED NEUTROPHILS ABSOLUTE COUNT: 16.9 THOU/MM3 (ref 1.8–7.7)
SODIUM BLD-SCNC: 141 MEQ/L (ref 135–145)
WBC # BLD: 23.5 THOU/MM3 (ref 4.8–10.8)

## 2022-02-24 PROCEDURE — 6370000000 HC RX 637 (ALT 250 FOR IP): Performed by: INTERNAL MEDICINE

## 2022-02-24 PROCEDURE — 99233 SBSQ HOSP IP/OBS HIGH 50: CPT | Performed by: INTERNAL MEDICINE

## 2022-02-24 PROCEDURE — 1200000000 HC SEMI PRIVATE

## 2022-02-24 PROCEDURE — 97530 THERAPEUTIC ACTIVITIES: CPT

## 2022-02-24 PROCEDURE — 94760 N-INVAS EAR/PLS OXIMETRY 1: CPT

## 2022-02-24 PROCEDURE — 6370000000 HC RX 637 (ALT 250 FOR IP)

## 2022-02-24 PROCEDURE — 94640 AIRWAY INHALATION TREATMENT: CPT

## 2022-02-24 PROCEDURE — 6370000000 HC RX 637 (ALT 250 FOR IP): Performed by: HOSPITALIST

## 2022-02-24 PROCEDURE — 2700000000 HC OXYGEN THERAPY PER DAY

## 2022-02-24 PROCEDURE — 97110 THERAPEUTIC EXERCISES: CPT

## 2022-02-24 PROCEDURE — 80048 BASIC METABOLIC PNL TOTAL CA: CPT

## 2022-02-24 PROCEDURE — 85025 COMPLETE CBC W/AUTO DIFF WBC: CPT

## 2022-02-24 PROCEDURE — 71046 X-RAY EXAM CHEST 2 VIEWS: CPT

## 2022-02-24 PROCEDURE — 6360000002 HC RX W HCPCS

## 2022-02-24 PROCEDURE — 82948 REAGENT STRIP/BLOOD GLUCOSE: CPT

## 2022-02-24 PROCEDURE — 6360000002 HC RX W HCPCS: Performed by: INTERNAL MEDICINE

## 2022-02-24 PROCEDURE — 97116 GAIT TRAINING THERAPY: CPT

## 2022-02-24 PROCEDURE — 2580000003 HC RX 258: Performed by: INTERNAL MEDICINE

## 2022-02-24 PROCEDURE — 94669 MECHANICAL CHEST WALL OSCILL: CPT

## 2022-02-24 PROCEDURE — 6360000002 HC RX W HCPCS: Performed by: NURSE PRACTITIONER

## 2022-02-24 PROCEDURE — 6360000002 HC RX W HCPCS: Performed by: PHYSICIAN ASSISTANT

## 2022-02-24 PROCEDURE — APPSS30 APP SPLIT SHARED TIME 16-30 MINUTES: Performed by: NURSE PRACTITIONER

## 2022-02-24 PROCEDURE — 6370000000 HC RX 637 (ALT 250 FOR IP): Performed by: NURSE PRACTITIONER

## 2022-02-24 PROCEDURE — 2580000003 HC RX 258

## 2022-02-24 PROCEDURE — 99232 SBSQ HOSP IP/OBS MODERATE 35: CPT | Performed by: INTERNAL MEDICINE

## 2022-02-24 RX ORDER — FUROSEMIDE 10 MG/ML
40 INJECTION INTRAMUSCULAR; INTRAVENOUS ONCE
Status: COMPLETED | OUTPATIENT
Start: 2022-02-24 | End: 2022-02-24

## 2022-02-24 RX ORDER — ALBUTEROL SULFATE 2.5 MG/3ML
2.5 SOLUTION RESPIRATORY (INHALATION) EVERY 4 HOURS PRN
Status: DISCONTINUED | OUTPATIENT
Start: 2022-02-24 | End: 2022-02-25 | Stop reason: HOSPADM

## 2022-02-24 RX ORDER — ALBUTEROL SULFATE 2.5 MG/3ML
2.5 SOLUTION RESPIRATORY (INHALATION) 2 TIMES DAILY
Status: DISCONTINUED | OUTPATIENT
Start: 2022-02-24 | End: 2022-02-25 | Stop reason: HOSPADM

## 2022-02-24 RX ADMIN — METOPROLOL TARTRATE 50 MG: 50 TABLET, FILM COATED ORAL at 21:39

## 2022-02-24 RX ADMIN — PANTOPRAZOLE SODIUM 40 MG: 40 TABLET, DELAYED RELEASE ORAL at 05:36

## 2022-02-24 RX ADMIN — Medication 9 MG: at 21:39

## 2022-02-24 RX ADMIN — INSULIN GLARGINE 60 UNITS: 100 INJECTION, SOLUTION SUBCUTANEOUS at 21:42

## 2022-02-24 RX ADMIN — HYDRALAZINE HYDROCHLORIDE 50 MG: 50 TABLET, FILM COATED ORAL at 21:39

## 2022-02-24 RX ADMIN — ALBUTEROL SULFATE 2.5 MG: 2.5 SOLUTION RESPIRATORY (INHALATION) at 07:37

## 2022-02-24 RX ADMIN — GUAIFENESIN 600 MG: 600 TABLET, EXTENDED RELEASE ORAL at 08:49

## 2022-02-24 RX ADMIN — AMITRIPTYLINE HYDROCHLORIDE 10 MG: 10 TABLET, FILM COATED ORAL at 21:39

## 2022-02-24 RX ADMIN — ENOXAPARIN SODIUM 60 MG: 100 INJECTION SUBCUTANEOUS at 00:21

## 2022-02-24 RX ADMIN — DILTIAZEM HYDROCHLORIDE 30 MG: 30 TABLET, FILM COATED ORAL at 08:50

## 2022-02-24 RX ADMIN — GUAIFENESIN 600 MG: 600 TABLET, EXTENDED RELEASE ORAL at 21:39

## 2022-02-24 RX ADMIN — ENOXAPARIN SODIUM 60 MG: 100 INJECTION SUBCUTANEOUS at 23:28

## 2022-02-24 RX ADMIN — METOPROLOL TARTRATE 50 MG: 50 TABLET, FILM COATED ORAL at 08:49

## 2022-02-24 RX ADMIN — TIOTROPIUM BROMIDE AND OLODATEROL 2 PUFF: 3.124; 2.736 SPRAY, METERED RESPIRATORY (INHALATION) at 07:37

## 2022-02-24 RX ADMIN — HYDRALAZINE HYDROCHLORIDE 10 MG: 20 INJECTION INTRAMUSCULAR; INTRAVENOUS at 03:38

## 2022-02-24 RX ADMIN — AZITHROMYCIN DIHYDRATE 500 MG: 500 INJECTION, POWDER, LYOPHILIZED, FOR SOLUTION INTRAVENOUS at 22:19

## 2022-02-24 RX ADMIN — HYDRALAZINE HYDROCHLORIDE 50 MG: 50 TABLET, FILM COATED ORAL at 14:13

## 2022-02-24 RX ADMIN — BUDESONIDE 500 MCG: 0.5 INHALANT RESPIRATORY (INHALATION) at 07:37

## 2022-02-24 RX ADMIN — INSULIN GLARGINE 20 UNITS: 100 INJECTION, SOLUTION SUBCUTANEOUS at 08:56

## 2022-02-24 RX ADMIN — BUDESONIDE 500 MCG: 0.5 INHALANT RESPIRATORY (INHALATION) at 16:15

## 2022-02-24 RX ADMIN — DILTIAZEM HYDROCHLORIDE 30 MG: 30 TABLET, FILM COATED ORAL at 14:13

## 2022-02-24 RX ADMIN — HYDRALAZINE HYDROCHLORIDE 50 MG: 50 TABLET, FILM COATED ORAL at 05:36

## 2022-02-24 RX ADMIN — HYDRALAZINE HYDROCHLORIDE 10 MG: 20 INJECTION INTRAMUSCULAR; INTRAVENOUS at 16:22

## 2022-02-24 RX ADMIN — ENOXAPARIN SODIUM 60 MG: 100 INJECTION SUBCUTANEOUS at 11:34

## 2022-02-24 RX ADMIN — LISINOPRIL 20 MG: 20 TABLET ORAL at 21:39

## 2022-02-24 RX ADMIN — PREDNISONE 40 MG: 20 TABLET ORAL at 08:49

## 2022-02-24 RX ADMIN — FUROSEMIDE 40 MG: 10 INJECTION, SOLUTION INTRAMUSCULAR; INTRAVENOUS at 18:45

## 2022-02-24 RX ADMIN — OLANZAPINE 10 MG: 10 TABLET, FILM COATED ORAL at 21:39

## 2022-02-24 RX ADMIN — SODIUM CHLORIDE, PRESERVATIVE FREE 10 ML: 5 INJECTION INTRAVENOUS at 21:41

## 2022-02-24 RX ADMIN — ALBUTEROL SULFATE 2.5 MG: 2.5 SOLUTION RESPIRATORY (INHALATION) at 16:15

## 2022-02-24 RX ADMIN — SODIUM CHLORIDE, PRESERVATIVE FREE 10 ML: 5 INJECTION INTRAVENOUS at 08:52

## 2022-02-24 RX ADMIN — GABAPENTIN 800 MG: 400 CAPSULE ORAL at 08:50

## 2022-02-24 RX ADMIN — ATORVASTATIN CALCIUM 40 MG: 40 TABLET, FILM COATED ORAL at 21:39

## 2022-02-24 RX ADMIN — ASPIRIN 81 MG CHEWABLE TABLET 81 MG: 81 TABLET CHEWABLE at 08:51

## 2022-02-24 RX ADMIN — LISINOPRIL 20 MG: 20 TABLET ORAL at 08:49

## 2022-02-24 RX ADMIN — GABAPENTIN 800 MG: 400 CAPSULE ORAL at 21:39

## 2022-02-24 RX ADMIN — CEFTRIAXONE SODIUM 2000 MG: 2 INJECTION, POWDER, FOR SOLUTION INTRAMUSCULAR; INTRAVENOUS at 21:33

## 2022-02-24 RX ADMIN — DILTIAZEM HYDROCHLORIDE 30 MG: 30 TABLET, FILM COATED ORAL at 21:39

## 2022-02-24 RX ADMIN — GABAPENTIN 800 MG: 400 CAPSULE ORAL at 14:13

## 2022-02-24 NOTE — PROGRESS NOTES
[address] [facility]  Occupational Therapy  Daily Note  Time:   Time In: 7937  Time Out: 1104  Timed Code Treatment Minutes: 23 Minutes  Minutes: 23          Date: 2022  Patient Name: Rodolfo Ovalle,   Gender: female      Room: [facility]/027-A  MRN: [id number]  : 1980  (37 y.o.)  Referring Practitioner: Dr. Mason Justice MD  Diagnosis: Acute Respiratory Failure with Hypoxia  Additional Pertinent Hx: Patient presents with worsening shortness of breath. Family in room to assist with history. Symptoms reportedly began about 4 days prior to admission. She describes worsening dyspnea and increased wheezing over the last few days. No obvious inciting incident noted by patient. She denies any chest pain, fever, chills, cough. She states she does not have increased sputum production at home. She is not on any home O2. She is being treated outpatient for COPD with Symbicort and albuterol inhalers, states she has not seen pulmonology previously and has not had formal PFTs done. Per family, patient has been acting strangely at home with some mild confusion and worsening physical condition. Son states that she has had increased difficulty ambulating throughout the house. Patient also endorsing some diaphoresis at home, but states that her heart rate is typically high but has noticed heart racing the last few days.     Restrictions/Precautions:  Restrictions/Precautions: General Precautions,Fall Risk  Position Activity Restriction  Other position/activity restrictions: O2     SUBJECTIVE: Pt sitting in recliner upon arrival, pt agreeable to OT session, RN gave verbal approval for session     PAIN: 0/10:     Vitals: Oxygen: 3L at rest, however pt required 4-6 L with functional mob due to pt dropping to 88% with pt able to recover with extended rest breaks    COGNITION: Slow Processing, Decreased Recall and Decreased Insight    ADL:   No ADL's completed this session. Clement Fly BALANCE:  Standing Balance: Stand By Assistance. with RW for support with pt standing for 1-2 minute increments    BED MOBILITY:  Sit to Supine: Modified Independent with HOB lowered and not using railing    TRANSFERS:  Sit to Stand:  Stand By Assistance. from recliner  Stand to Sit: Stand By Assistance. back to recliner    FUNCTIONAL MOBILITY:  Assistive Device: Rolling Walker  Assist Level:  Stand By Assistance. Distance: HH distances with 2 standing rest breaks due to SOB       ADDITIONAL ACTIVITIES:  Patient completed BUE strengthening exercises with skilled education on HEP: completed x10 reps x2 set with a medium resistance band in all joints and all planes in order to improve UE strength and activity tolerance required for BADL routine and toilet / shower transfers. Patient tolerated good, requiring min rest breaks. Patient also required min cues for technique. ASSESSMENT:     Activity Tolerance:  Patient tolerance of  treatment: good. Discharge Recommendations: Home with Home Health OT  Equipment Recommendations: Equipment Needed: Yes  Other: Shower seat; grabbars in the tub/shower  Plan: Times per week: 5x  Specific instructions for Next Treatment: Functional mobility; ADLs and endurance training; Energy conservation techniques applied during routine tasks  Current Treatment Recommendations: Functional Mobility Training,Endurance Training,Self-Care / ADL,Patient/Caregiver Education & Training  Plan Comment: Pt would benefit from continued skilled OT services when medically stable and discharged from Acute. OPOT recommended. Patient Education  Patient Education: Importance of Increasing Activity    Goals  Short term goals  Time Frame for Short term goals: By discharge  Short term goal 1: Pt will demonstrate functional mobility within the kitchen or bathroom environment without any AD while carrying light objects with SBA to prepare for doing ADLs and light homemaking.   Short term goal 2: Pt will complete a spongebath or dressing herself while following energy conservation strategies while keeping O2 saturation > than 90% with SBA and min cues as needed to increase her activity tolerance for ease of doing her morning routine. Short term goal 3: Pt will complete BUE ROM/light resistance exercises x 10 reps each of 5 sets with cues for pursed lip breathing to increase her endurance for ease of doing ADLs and returning to work. Short term goal 4: Pt will describe 3 ways with which she will stop smoking with verbal cues as needed to increase her energy levels and help her lung function and overall health. Following session, patient left in safe position with all fall risk precautions in place.

## 2022-02-24 NOTE — PROGRESS NOTES
[place] [facility]    Patient - Luisa Marie   MRN -  [id number]   [facility] # - [de-identified]   - 1980      Date of Admission -  2022  8:17 PM  Date of evaluation -  2022  Room - --A   Kriss Rinaldi MD Primary Care Physician - ROBBIE Perez - CNP   Reason for consult   AECOPD with acute hypoxic respiratory failure  Active Hospital Problem List      Active Hospital Problems    Diagnosis Date Noted    Pneumonia due to infectious organism [J18.9]     JACIEL and COPD overlap syndrome ([facility] [address] [G47.33, J44.9]     [facility] with [facility] [J96.01] 2022     HPI   Luisa Marie is a 37 y.o. female with a PMH of hypertension, diabetes mellitus type 2, COPD and tobacco abuse presented with worsening shortness of breath and wheezing of 4 days duration. She denies any chest pain, fever, chills, cough. She states she does not have increased sputum production at home. She is not on any home O2. She is being treated outpatient for COPD with Symbicort and albuterol inhalers, states she has not seen pulmonology previously and has not had formal PFTs done. Patient smokes half pack per day she has been smoking for the last 20 years, she denies childhood asthma. Patient's oxygen saturation was  60% on room air. She was initiated on nonrebreather with improvement of SPO2 to 94%. She is eventually weaned down to 6 L NC. Patient received one-time dose of Rocephin and azithromycin for community-acquired pneumonia. CTA chest obtained demonstrated bilateral pneumonia significant reactive adenopathy.  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 Witnessed apneas during sleep noticed: Yes. History of choking and gasping sensation at night time: Yes. History of headaches in the morning:Yes. History of dry mouth in the morning: Yes. History of palpitations during night time/nocturnal awakenings: Yes. History of sweating during night time/nocturnal awakenings: Yes    General:  History of head injury in the past: No.  []  Due to MVA  [] Not due to MVA. Associated loss of consciousness with head injury: No.  History of seizures: No.   Rest less legs syndrome symptoms:NO  History suggestive of periodic limb movements during sleep: NO  History suggestive of hypnagogic hallucinations: NO  History suggestive of hypnopompic hallucinations: NO  History suggestive of sleep talking:NO  History suggestive of sleep walking:NO  History suggestive of bruxism: No. [] No mouth guard. [] Uses mouth guard. History suggestive of cataplexy: NO  History suggestive of sleep paralysis: NO    History regarding old sleep studies:  Prior history of sleep study: No.  Using CPAP device: No.  Currently using home Oxygen: NO.       Patient considerations:  Is the patient is ambulatory: Yes  Patient is currently using: None of these Wheelchair, [employer] or [employer]. Para/Quadriplegic: NO  Hearing deficit : NO  Claustrophobic: NO  MDD : NO  Blind: NO  Incontinent: NO  Para/Quadraplegi: NO.   Need transportation to and from Sleep Center:NO    [place] Sleepiness Score:   Sitting and readin  Watching TV:3  Sitting inactive in a public place:3  Being a passenger in a motor vehicle for an hour or more:3  Lying down in the afternoon:3  Sitting and talking to someone:1  Sitting quietly after lunch (no alcohol):1  Stopped for a few minutes in traffic while drivin  Total XHZTI:02    Mallampati Score: 4   Neck Circumference:23.0 Inches.     Past 24 hrs   -Wheezing improved   -O2 down to 3 LPM   -Reports cough has been improving  -Afebrile WBC 23.5  All other systems reviewed  Past Medical History         Diagnosis Date    Anxiety     Depression     Diabetes mellitus ([facility] [address] [facility]     Hyperlipidemia     Hypertension       Past Surgical History           Procedure Laterality Date    ABSCESS DRAINAGE       Diet    ADULT DIET; Regular; 5 carb choices (75 gm/meal)  Allergies    Codeine  Social History     Social History     Socioeconomic History    Marital status: Single     Spouse name: Not on file    Number of children: Not on file    Years of education: Not on file    Highest education level: Not on file   Occupational History    Not on file   Tobacco Use    Smoking status: Current Every Day Smoker     Packs/day: 1.00     Years: 15.00     Pack years: 15.00     Types: Cigarettes    Smokeless tobacco: Never Used   Substance and Sexual Activity    Alcohol use: No    Drug use: No    Sexual activity: Yes     Partners: Male   Other Topics Concern    Not on file   Social History Narrative    Not on file     Social Determinants of Health     Financial Resource Strain:     Difficulty of Paying Living Expenses: Not on file   Food Insecurity:     Worried About Running Out of Food in the Last Year: Not on file    Luis Felipe of Food in the Last Year: Not on file   Transportation Needs:     Lack of Transportation (Medical): Not on file    Lack of Transportation (Non-Medical):  Not on file   Physical Activity:     Days of Exercise per Week: Not on file    Minutes of Exercise per Session: Not on file   Stress:     Feeling of Stress : Not on file   Social Connections:     Frequency of Communication with Friends and Family: Not on file    Frequency of Social Gatherings with Friends and Family: Not on file    Attends Mormonism Services: Not on file    Active Member of Clubs or Organizations: Not on file    Attends Club or Organization Meetings: Not on file    Marital Status: Not on file   Intimate Partner Violence:     Fear of Current or Ex-Partner: Not on file    Emotionally Abused: Not on file    Physically Abused: Not on file    Sexually Abused: Not on file   Housing Stability:     Unable to Pay for Housing in the Last Year: Not on file    Number of Places Lived in the Last Year: Not on file    Unstable Housing in the Last Year: Not on file     Family History          Problem Relation Age of Onset    High Blood Pressure Mother     Heart Disease Mother     High Cholesterol Mother     Depression Mother     Stroke Mother     Diabetes Maternal Aunt     High Blood Pressure Maternal Aunt     High Cholesterol Maternal Aunt      Sleep History     No history but high suspicion of JACIEL per history   Meds    Current Medications    albuterol  2.5 mg Nebulization BID    hydrALAZINE  50 mg Oral 3 times per day    insulin glargine  20 Units SubCUTAneous Daily    tiotropium-olodaterol  2 puff Inhalation Daily    predniSONE  40 mg Oral Daily    budesonide  0.5 mg Nebulization BID    guaiFENesin  600 mg Oral BID    amitriptyline  10 mg Oral Nightly    aspirin  81 mg Oral Daily    atorvastatin  40 mg Oral Nightly    dilTIAZem  30 mg Oral TID    gabapentin  800 mg Oral TID    OLANZapine  10 mg Oral Nightly    azithromycin  500 mg IntraVENous Q24H    sodium chloride flush  10 mL IntraVENous 2 times per day    pantoprazole  40 mg Oral QAM AC    enoxaparin  60 mg SubCUTAneous Q12H    insulin glargine  60 Units SubCUTAneous Nightly    insulin lispro  0-18 Units SubCUTAneous TID WC    insulin lispro  0-9 Units SubCUTAneous Nightly    cefTRIAXone (ROCEPHIN) IV  2,000 mg IntraVENous Q24H    lisinopril  20 mg Oral BID    metoprolol  50 mg Oral BID    nicotine  1 patch TransDERmal Daily     albuterol, hydrALAZINE, melatonin, sodium chloride flush, sodium chloride, ondansetron **OR** ondansetron, polyethylene glycol, acetaminophen **OR** acetaminophen, glucose, glucagon (rDNA), dextrose, dextrose bolus (hypoglycemia) **OR** dextrose bolus (hypoglycemia)  IV Drips/Infusions   sodium chloride      dextrose      sodium chloride 125 mL/hr at 02/21/22 1325     Vitals    Vitals    height is 5' (1.524 m) and weight is 291 lb (132 kg). Her oral temperature is 98.4 °F (36.9 °C). Her blood pressure is 168/77 (abnormal) and her pulse is 93. Her respiration is 18 and oxygen saturation is 92%. O2 Flow Rate (L/min): 3 L/min  I/O    Intake/Output Summary (Last 24 hours) at 2/24/2022 1519  Last data filed at 2/24/2022 1425  Gross per 24 hour   Intake 900 ml   Output --   Net 900 ml     Patient Vitals for the past 96 hrs (Last 3 readings):   Weight   02/23/22 0552 291 lb (132 kg)   02/22/22 0429 290 lb 8 oz (131.8 kg)   02/21/22 0600 290 lb 5.5 oz (131.7 kg)     Exam   Physical Exam   Constitutional: No distress on O2 per NC. Patient appears obese BMI 56  Head: Normocephalic and atraumatic. Mouth/Throat: Oropharynx is clear and moist.  No oral thrush. Eyes: Conjunctivae are normal. Pupils are equal, round. No scleral icterus. Neck: Neck supple. No tracheal deviation present. Cardiovascular: S1 and S2 with no murmur. BLE edema  Pulmonary/Chest: Normal effort with bilateral air entry, clear breath sounds. No stridor. No respiratory distress. Patient exhibits no tenderness. Abdominal: Soft. Bowel sounds audible. No distension or tenderness to palp. Musculoskeletal: Moves all extremities  Neurological: Patient is alert and oriented to person, place, and time.      Labs   ABG  Lab Results   Component Value Date    PH 7.37 02/19/2022    PO2 54 02/19/2022    PCO2 54 02/19/2022    HCO3 31 02/19/2022    O2SAT 86 02/19/2022     Lab Results   Component Value Date    IFIO2 5 02/19/2022     CBC  Recent Labs     02/22/22  1410 02/23/22  1052 02/24/22  0500   WBC 20.8* 24.2* 23.5*   RBC 5.36 5.29 5.60*   HGB 16.4* 16.1* 16.9*   HCT 54.6* 50.6* 54.4*   .9* 95.7 97.1   MCH 30.6 30.4 30.2   MCHC 30.0* 31.8* 31.1*   PLT 296 321 370   MPV 10.2 9.8 10.0      BMP  Recent Labs     02/22/22  1410 02/23/22  1052 02/24/22  0500    136 141   K 5.6* 5.2 3.9    98 97*   CO2 25 29 36*   BUN 24* 17 15   CREATININE 0.7 0.5 0.4   GLUCOSE 289* 232* 86   CALCIUM 8.4* 8.8 8.5     LFT  No results for input(s): AST, ALT, ALB, BILITOT, ALKPHOS, LIPASE in the last 72 hours. Invalid input(s): AMYLASE  TROP  Lab Results   Component Value Date    TROPONINT < 0.010 02/18/2022    TROPONINT < 0.010 08/08/2018    TROPONINT < 0.010 07/11/2017     BNP  Lab Results   Component Value Date    PROBNP 2561.0 02/18/2022    PROBNP 10.2 07/11/2017     D-Dimer  Lab Results   Component Value Date    DDIMER 377.00 08/08/2018     Lactic Acid  No results for input(s): LACTA in the last 72 hours. INR  No results for input(s): INR, PROTIME in the last 72 hours. PTT  No results for input(s): APTT in the last 72 hours. Glucose  Recent Labs     02/23/22 2013 02/24/22  0612 02/24/22  1117   POCGLU 258* 75 102     UA No results for input(s): SPECGRAV, PHUR, COLORU, CLARITYU, MUCUS, PROTEINU, BLOODU, RBCUA, WBCUA, BACTERIA, NITRU, GLUCOSEU, BILIRUBINUR, UROBILINOGEN, KETUA, LABCAST, LABCASTTY, AMORPHOS in the last 72 hours. Invalid input(s): CRYSTALS. PFTs   No records  Echo     Summary   Technically difficult examination. Left ventricular size and systolic function is normal. Ejection fraction   was estimated at 55-60%. LV wall thickness is within normal limits and   there are no obvious wall motion abnormalities. The right ventricular size appears normal with normal systolic function   and wall thickness. Cultures    Procalcitonin  Lab Results   Component Value Date    PROCAL 0.12 02/18/2022    PROCAL 0.05 08/08/2018    PROCAL 0.03 07/11/2017     Radiology    CXR    XR CHEST PORTABLE   2/18/2022   FINDINGS:  There is mild cardiomegaly. .The mediastinum is not widened. There is slightly increased density throughout both lung fields.  There are no effusions. . The pulmonary vascularity is increased. No suspicious osseous lesions are present. Impression:  1. Cardiomegaly and possible mild congestive heart failure. 2.. Slightly increased density throughout both lung fields. CT Scans  CTA   2/18/2022  Findings: Uniform density through the thyroid gland. Lymphoid hyperplasia in the superior mediastinum, hilar regions and infracarinal tissues. Adenopathy may be reactive to scattered atelectasis and infiltrate through the lungs bilaterally. There is groundglass opacification centrally in the upper lobes with predominantly atelectasis and consolidative infiltrate scattered through the lower lobes and right middle lobe. Minimal airspace opacification is noted to the lingula. There is no evidence of pulmonary embolism or aortic dissection. Small hernia is evident with moderate volume of air seen into the distal esophagus suggesting underlying gastroesophageal reflux disease, correlate clinically. The included portions of the upper abdomen are notable for hepatomegaly. Axillary lymph nodes are benign appearing. The patient's breast tissues are that of scattered fibroglandular nature. For healthcare maintenance purposes she should be reminded that the [employer] of Radiology recommends screening mammography commencing at the age of 37. Impression:  Bilateral pneumonia with significant reactive adenopathy. Follow-up recommended to ensure complete clearing of via chest radiography and subsequent CT examination across the next 6-8 weeks.          (See actual reports for details)    Assessment   -Acute respiratory failure with hypoxia 2/2 COPD exacerbation  -COPD exacerbation  -Right middle lobe and lingular infiltrate/atelectasis  -Tobacco abuse  -Obesity with high suspicion for obstructive sleep apnea  -elevated ProBNP 2561, LVEF 67-18 possible diastolic dysfunction  -Hypertension  -Diabetes mellitus type 2  Recommendations   -Monitor SpO2 wean supplemental O2 to maintain SpO2 >90%  -Agree with empiric ATB's Azithromycin and Rocephin  -Solumedrol 40 gm IV BID transition to PO prednisone 40 mg in AM   -NRT 14 mg per Day advised to continue efforts of tobacco cessation  -HTN per primary recommend monitoring volume status possible component of volume overload contributing to hypoxia as ProBNP is elevated and weight aguilar been trending up, will give 40 mg IV once  -Duonebs Q 4 hrs WA   -Pulmicort 500 mcg via neb BID   -Guaifenesin 600 mg PO BID   -Pulmonary hygiene with IS and acapella   -Home O2 eval tomorrow   -Will need follow-up in Pulmonary clinic in 3 months with Full PFT and Ct chest to follow lingula infiltrate    Case discussed with nurse and patient/family. Questions and concerns addressed. Meds and Orders reviewed. Electronically signed by   ROBBIE Cochran CNP on 2/24/2022 at 3:19 PM    Addendum by Dr. Leopold Som, MD:  I have seen and examined the patient independently. Face to face evaluation and examination was performed. The above evaluation and note has been reviewed. Labs and radiographs were reviewed. I Have discussed with Mr. Abdi Ruffin CNP about this patient in detail. Physical exam was performed by me. See below for details. More than half of the total time for this encounter spent by me. The above assessment and plan has been reviewed. Please see my modifications mentioned below. My modifications:    Physical Exam:  Constitutional: Patient appears in no distress on 4LPM via nasal cannula. Mouth/Throat: Oropharynx is clear and moist.   Neck: Neck supple. Cardiovascular: S1 and S2 heard. No murmurs. Pulmonary/Chest: Bilateral air entry present. Good breath sounds on both sides, diminished at bases. No wheezes. No rales. Abdominal: Soft. No tenderness. Extremities: Patient exhibits no edema. Neurological: Patient is awake and alert.    Follow-up as above    Jennifer Rolle MD 2/24/2022 5:46 PM

## 2022-02-24 NOTE — RT PROTOCOL NOTE
RT Inhaler-Nebulizer Bronchodilator Protocol Note    There is a bronchodilator order in the chart from a provider indicating to follow the RT Bronchodilator Protocol and there is an Initiate RT Inhaler-Nebulizer Bronchodilator Protocol order as well (see protocol at bottom of note). CXR Findings:  No results found. The findings from the last RT Protocol Assessment were as follows:   History Pulmonary Disease: Chronic pulmonary disease (COPD)  Respiratory Pattern: Regular pattern and RR 12-20 bpm  Breath Sounds: Slightly diminished and/or crackles  Cough: Strong, spontaneous, non-productive  Indication for Bronchodilator Therapy: Decreased or absent breath sounds  Bronchodilator Assessment Score: 4    Aerosolized bronchodilator medication orders have been revised according to the RT Inhaler-Nebulizer Bronchodilator Protocol below. Respiratory Therapist to perform RT Therapy Protocol Assessment initially then follow the protocol. Repeat RT Therapy Protocol Assessment PRN for score 0-3 or on second treatment, BID, and PRN for scores above 3. No Indications - adjust the frequency to every 6 hours PRN wheezing or bronchospasm, if no treatments needed after 48 hours then discontinue using Per Protocol order mode. If indication present, adjust the RT bronchodilator orders based on the Bronchodilator Assessment Score as indicated below. Use Inhaler orders unless patient has one or more of the following: on home nebulizer, not able to hold breath for 10 seconds, is not alert and oriented, cannot activate and use MDI correctly, or respiratory rate 25 breaths per minute or more, then use the equivalent nebulizer order(s) with same Frequency and PRN reasons based on the score. If a patient is on this medication at home then do not decrease Frequency below that used at home.     0-3 - enter or revise RT bronchodilator order(s) to equivalent RT Bronchodilator order with Frequency of every 4 hours PRN for wheezing or increased work of breathing using Per Protocol order mode. 4-6 - enter or revise RT Bronchodilator order(s) to two equivalent RT bronchodilator orders with one order with BID Frequency and one order with Frequency of every 4 hours PRN wheezing or increased work of breathing using Per Protocol order mode. 7-10 - enter or revise RT Bronchodilator order(s) to two equivalent RT bronchodilator orders with one order with TID Frequency and one order with Frequency of every 4 hours PRN wheezing or increased work of breathing using Per Protocol order mode. 11-13 - enter or revise RT Bronchodilator order(s) to one equivalent RT bronchodilator order with QID Frequency and an Albuterol order with Frequency of every 4 hours PRN wheezing or increased work of breathing using Per Protocol order mode. Greater than 13 - enter or revise RT Bronchodilator order(s) to one equivalent RT bronchodilator order with every 4 hours Frequency and an Albuterol order with Frequency of every 2 hours PRN wheezing or increased work of breathing using Per Protocol order mode. RT to enter RT Home Evaluation for COPD & MDI Assessment order using Per Protocol order mode.     Electronically signed by Quan Casillas RCP on 2/24/2022 at 7:52 AM

## 2022-02-24 NOTE — PLAN OF CARE
Problem: RESPIRATORY  Goal: Clear lung sounds  Description: Clear lung sounds  Outcome: Ongoing  Note: Albuterol, Pulmicort, and Stiolto to improve breath sounds. Acapella to improve cough effectiveness.

## 2022-02-24 NOTE — PROGRESS NOTES
[address] [address]  INPATIENT PHYSICAL THERAPY  DAILY NOTE  [facility] 7K - 5Q-94/408-M    Time In: 0945  Time Out: 1010  Timed Code Treatment Minutes: 25 Minutes  Minutes: 25          Date: 2022  Patient Name: Yanna Russ,  Gender:  female        MRN: [id number]  : 1980  (37 y.o.)     Referring Practitioner: Sebastian Valero MD  Diagnosis: Acute respiratory failure with hypoxia  Additional Pertinent Hx: Patient presents with worsening shortness of breath. Family in room to assist with history. Symptoms reportedly began about 4 days prior to admission. She describes worsening dyspnea and increased wheezing over the last few days. No obvious inciting incident noted by patient. She denies any chest pain, fever, chills, cough. She states she does not have increased sputum production at home. She is not on any home O2. She is being treated outpatient for COPD with Symbicort and albuterol inhalers, states she has not seen pulmonology previously and has not had formal PFTs done. Per family, patient has been acting strangely at home with some mild confusion and worsening physical condition. Son states that she has had increased difficulty ambulating throughout the house. Patient also endorsing some diaphoresis at home, but states that her heart rate is typically high but has noticed heart racing the last few days.      Prior Level of Function:  Lives With: Family (With son, daughter and occasionally an 7 month old grandchild)  Type of Home: Apartment  Home Layout: Two level,Bed/Bath upstairs  Home Access: Stairs to enter with rails  Entrance Stairs - Number of Steps: 1 step at the threshhold without a rail; 1 flight of steps to her bedroom and bathroom with 1 rail   Bathroom Shower/Tub: Tub/Shower unit  Bathroom Toilet: Standard  Bathroom Accessibility: Accessible    Receives Help From: Family  ADL Assistance: [facility] [address]: Needs assistance  Homemaking Responsibilities: Yes  Ambulation Assistance: Independent  Transfer Assistance: Independent  Active : No  Additional Comments: Pt was not using any AD for ambulation. She has been needing help from family members to do some of the homemaking secondary to fatigue levels. She is independent with self care. Pt only used an inhaler while at home prn. No supplemental O2 used. Restrictions/Precautions:  Restrictions/Precautions: General Precautions,Fall Risk  Position Activity Restriction  Other position/activity restrictions: O2     SUBJECTIVE: RN approved session. Patient sitting up in bed upon arrival, reports just getting washed up shortly before and stated be fatigued but agreeable/cooperative to therapy. PAIN: No pain noted. Vitals: Oxygen: 3L at rest: 02 88-89% while sitting EOB, requried increased verbal cues for correct breathing technique and did increase to 90-92%. 4L with activity, 90-92% with ambulation, did decrease to 90%, had patient take short standing rest break and increased to 91-92%. Decreased again to 87-89% after sitting back down in chair, again needed cues for breathing, recovered back to low 93-94% in chair and decreased 02 back to 3L. Heart Rate: 101-104 bpm    OBJECTIVE:  Bed Mobility:  Not Tested    Transfers:  Sit to Stand: Stand By Assistance  Stand to Sit:Stand By Assistance    Ambulation:  Stand By Assistance, [employer] Assistance  Distance: 100 ft. x1 (1 standing rest break needed, stood for ~1 minute before continuing.)   Surface: Level Tile  Device:Rolling Walker  Gait Deviations: Forward Flexed Posture, Slow Zayda, Decreased Step Length Bilaterally, Decreased Gait Speed and Decreased Heel Strike Bilaterally  [employer] with patient about using RW at home until endurance improves to better conserve energy, patient acknowledged understanding.      Balance:  Static Standing Balance: Stand By Assistance  Dynamic Standing Balance: Stand By Assistance, Contact Guard Assistance    Exercise:  Patient was guided in 1 set(s) 10 reps of exercise to both lower extremities. Seated marches, Seated heel/toe raises and Long arc quads. Exercises were completed for increased independence with functional mobility. Functional Outcome Measures: Not completed       ASSESSMENT:  Assessment: Patient progressing toward established goals. The patient tolerated session fairly well, showed improved tolerance to ambulation and maintaining 02 levels with use of RW this date. Able to only use 4L 02 with activity and maintaining Sp02 at 90% or above. Would benefit from additional skilled PT to increase strength, endurance, balance and safety for improved functional mobility. Activity Tolerance:  Patient tolerance of  treatment: good. Equipment Recommendations:Equipment Needed: Yes, RW, notified  on 2/24/22.    Discharge Recommendations: Home with Home Health PT  Plan: Times per week: 5x GM  Times per day: Daily  Current Treatment Recommendations: Strengthening,Stair training,Endurance Training,Functional Mobility Training,Gait Training    Patient Education  Patient Education: Plan of Care, Precautions/Restrictions, Transfers, Reviewed Prior Education, Gait, Health Promotion and Wellness Education, Home Safety Education, Activity Pacing, Energy Conservation, Pursed Lip Breathing, - Patient Requires Continued Education    Goals:  Patient goals : none stated  Short term goals  Time Frame for Short term goals: by discharge  Short term goal 1: bed mobility with HOB flat, no rails, mod I for increased functional ind  Short term goal 2: sit<>stand from various surfaces with LRD mod I for safe transfers  Short term goal 3: ambulate 48' with LRD mod I and good oxygen saturations for safe household distances  Short term goal 4: navigate 10 steps with LRD mod I and good oxygen saturations for safe enter/exit of home  Long term goals  Time Frame for Long term goals : NA d/t short ELOS    Following session, patient left in safe position with all fall risk precautions in place.

## 2022-02-24 NOTE — PROGRESS NOTES
Physician Progress Note      PATIENT:               Alexia Mendiola  CSN #:                  [id number]  :                       1980  ADMIT DATE:       2022 8:17 PM  [address] [place] DATE:  RESPONDING  PROVIDER #:        Saniya Posey MD          QUERY TEXT:    Pt admitted with pneumonia. If possible, please document in the progress notes   and discharge summary if you are evaluating and/or treating any of the   following:    Note: CAP and HCAP indicate where the pneumonia was acquired, not a specific   type. The medical record reflects the following:  Risk Factors: pneumonia  Clinical Indicators: CXR: Mild pulmonary edema. Possible small left-sided   pleural effusion; diminished lung sounds  Treatment: Labs, imaging, Diflucan, Rocephin (now d/c)  Options provided:  -- Gram negative pneumonia  -- Gram positive pneumonia  -- MRSA pneumonia  -- MSSA pneumonia  -- Bacterial pneumonia  -- Viral pneumonia  -- Aspiration pneumonia  -- Hypostatic pneumonia  -- No pneumonia  -- Other - I will add my own diagnosis  -- Disagree - Not applicable / Not valid  -- Disagree - Clinically unable to determine / Unknown  -- Refer to Clinical Documentation Reviewer    PROVIDER RESPONSE TEXT:    Provider is clinically unable to determine a response to this query.     Query created by: Kaitlyn Ramirez on 2022 9:04 AM      Electronically signed by:  Saniya Posey MD 2022 11:53 AM

## 2022-02-24 NOTE — PLAN OF CARE
Problem: Cardiovascular  Goal: No DVT, peripheral vascular complications  Outcome: Ongoing     Problem: Respiratory  Goal: O2 Sat > 90%  Outcome: Ongoing     Problem: GI  Goal: No bowel complications  Outcome: Ongoing     Problem:   Goal: Adequate urinary output  Outcome: Ongoing     Problem: Discharge Planning:  Goal: Discharged to appropriate level of care  Description: Discharged to appropriate level of care  Outcome: Ongoing     Problem: Safety:  Goal: Free from accidental physical injury  Description: Free from accidental physical injury  Outcome: Ongoing     Care plan reviewed with patient and son. Patient and son verbalize understanding of the plan of care and contribute to goal setting.

## 2022-02-24 NOTE — PROGRESS NOTES
Brooks Mckeon was evaluated today and a DME order was entered for a wheeled walker because she requires this to successfully complete daily living tasks of toileting, personal cares and ambulating. A wheeled walker is necessary due to the patient's unsteady gait, upper body weakness, and inability to  an ambulation device; and she can ambulate only by pushing a walker instead of a lesser assistive device such as a cane, crutch, or standard walker. The need for this equipment was discussed with the patient and she understands and is in agreement. Cassie Martinez  [facility] [address]

## 2022-02-24 NOTE — PROGRESS NOTES
I spoke to his wife who was calling for PET results;  I released the results to her on MyChart and discussed the major findings that concern us, including the PET activity of the lung and oropharynx.   I explained that we are discussing his case at our Friday morning weekly conference to best determine how to biopsy this.   I told her I wouldn't have any plan or recommendations until after that meeting and will call her as soon as I can with details.   She agreed with this plan.   Physician Progress Note      PATIENT:               Michael Siddqiui  CSN #:                  [id number]  :                       1980  ADMIT DATE:       2022 8:17 PM  [address] [place] DATE:  RESPONDING  PROVIDER #:        Damien Rodríguez MD          QUERY TEXT:    Patient admitted with pneumonia and documentation reflects sepsis in   clarification response. If possible, please document in the progress notes   and discharge summary if sepsis was: The medical record reflects the following:  Risk Factors: Pneumonia  Clinical Indicators: on arrival WBC 18.7, CRP 3.77, lactic 2.1, temp 98.3, HR   114, RR 22; sepsis was documented as POA per clarification response  and   is documented in progress note from   Treatment: Labs, imaging, Zithromax, Rocephin  Options provided:  -- Sepsis treated and resolved  -- Sepsis ruled out after study  -- Other - I will add my own diagnosis  -- Disagree - Not applicable / Not valid  -- Disagree - Clinically unable to determine / Unknown  -- Refer to Clinical Documentation Reviewer    PROVIDER RESPONSE TEXT:    Sepsis ruled out after study.     Query created by: Harrie Nyhan on 2022 11:17 AM      Electronically signed by:  Damien Rodríguez MD 2022 8:19 AM

## 2022-02-24 NOTE — CARE COORDINATION
2/24/22, 12:56 PM EST    DISCHARGE ON GOING [address] day: 6  Location: [facility]27/027-A Reason for admit: Acute respiratory failure with hypoxia ([facility] [address] [J96.01]  Pneumonia due to infectious organism, unspecified laterality, unspecified part of lung [J18.9]   Procedure: n/a  Barriers to Discharge: Continues to require 3L NC. Remains on IV steroid. Per provider's note, wheezing. PCP: ROBBIE Zuniga CNP  Readmission Risk Score: 10.2 ( )%  Patient Goals/Plan/Treatment Preferences: Plans home with [facility], prefers [facility] if home O2 is needed.

## 2022-02-24 NOTE — CARE COORDINATION
DISCHARGE/PLANNING EVALUATION  2/24/22, 10:41 AM EST    Reason for Referral: home health   Mental Status: alert, oriented   Decision Making:  Makes own decisions   Family/Social/Home Environment:  Yamilet Menon lives at home with family. She plans home with family support at discharge. She was independent with personal care prior to admission, family helps with household tasks  Current Services including food security, transportation and housekeeping:  No current services. Family helps with meals, housekeeping, transportation  Current Equipment: none prior to admission, requesting walker   Payment Source: [facility]  Concerns or Barriers to Discharge: requesting [facility] and walker   Post acute provider list with quality measures, geographic area and applicable managed care information provided. Questions regarding selection process answered: declined list, prefers [facility]    Teach Back Method used with patient regarding care plan and discharge plan  Patient  verbalizes understanding of the plan of care and contributes to goal setting. Patient goals, treatment preferences and discharge plan:  Spoke with Yamilet Menon. She plans home with family, requests [facility]. Referral initiated to [facility].        Electronically signed by LEO Ty on 2/24/2022 at 10:41 AM        2

## 2022-02-25 VITALS
HEIGHT: 60 IN | WEIGHT: 287.04 LBS | RESPIRATION RATE: 18 BRPM | OXYGEN SATURATION: 93 % | SYSTOLIC BLOOD PRESSURE: 140 MMHG | BODY MASS INDEX: 56.35 KG/M2 | TEMPERATURE: 97.8 F | DIASTOLIC BLOOD PRESSURE: 81 MMHG | HEART RATE: 98 BPM

## 2022-02-25 LAB
ANION GAP SERPL CALCULATED.3IONS-SCNC: 7 MEQ/L (ref 8–16)
BASOPHILS # BLD: 0.4 %
BASOPHILS ABSOLUTE: 0.1 THOU/MM3 (ref 0–0.1)
BUN BLDV-MCNC: 21 MG/DL (ref 7–22)
CALCIUM SERPL-MCNC: 8.9 MG/DL (ref 8.5–10.5)
CHLORIDE BLD-SCNC: 93 MEQ/L (ref 98–111)
CO2: 39 MEQ/L (ref 23–33)
CREAT SERPL-MCNC: 0.6 MG/DL (ref 0.4–1.2)
EOSINOPHIL # BLD: 1.2 %
EOSINOPHILS ABSOLUTE: 0.2 THOU/MM3 (ref 0–0.4)
ERYTHROCYTE [DISTWIDTH] IN BLOOD BY AUTOMATED COUNT: 14 % (ref 11.5–14.5)
ERYTHROCYTE [DISTWIDTH] IN BLOOD BY AUTOMATED COUNT: 50.3 FL (ref 35–45)
GFR SERPL CREATININE-BSD FRML MDRD: > 90 ML/MIN/1.73M2
GLUCOSE BLD-MCNC: 104 MG/DL (ref 70–108)
GLUCOSE BLD-MCNC: 128 MG/DL (ref 70–108)
GLUCOSE BLD-MCNC: 189 MG/DL (ref 70–108)
HCT VFR BLD CALC: 52.8 % (ref 37–47)
HEMOGLOBIN: 16.4 GM/DL (ref 12–16)
IMMATURE GRANS (ABS): 0.11 THOU/MM3 (ref 0–0.07)
IMMATURE GRANULOCYTES: 0.6 %
LYMPHOCYTES # BLD: 21.8 %
LYMPHOCYTES ABSOLUTE: 4.3 THOU/MM3 (ref 1–4.8)
MCH RBC QN AUTO: 30.3 PG (ref 26–33)
MCHC RBC AUTO-ENTMCNC: 31.1 GM/DL (ref 32.2–35.5)
MCV RBC AUTO: 97.4 FL (ref 81–99)
MONOCYTES # BLD: 7.6 %
MONOCYTES ABSOLUTE: 1.5 THOU/MM3 (ref 0.4–1.3)
NUCLEATED RED BLOOD CELLS: 0 /100 WBC
PLATELET # BLD: 372 THOU/MM3 (ref 130–400)
PMV BLD AUTO: 9.7 FL (ref 9.4–12.4)
POTASSIUM SERPL-SCNC: 4.1 MEQ/L (ref 3.5–5.2)
RBC # BLD: 5.42 MILL/MM3 (ref 4.2–5.4)
SEG NEUTROPHILS: 68.4 %
SEGMENTED NEUTROPHILS ABSOLUTE COUNT: 13.3 THOU/MM3 (ref 1.8–7.7)
SODIUM BLD-SCNC: 139 MEQ/L (ref 135–145)
WBC # BLD: 19.5 THOU/MM3 (ref 4.8–10.8)

## 2022-02-25 PROCEDURE — APPSS30 APP SPLIT SHARED TIME 16-30 MINUTES: Performed by: NURSE PRACTITIONER

## 2022-02-25 PROCEDURE — 80048 BASIC METABOLIC PNL TOTAL CA: CPT

## 2022-02-25 PROCEDURE — 99232 SBSQ HOSP IP/OBS MODERATE 35: CPT | Performed by: INTERNAL MEDICINE

## 2022-02-25 PROCEDURE — 97530 THERAPEUTIC ACTIVITIES: CPT

## 2022-02-25 PROCEDURE — 82948 REAGENT STRIP/BLOOD GLUCOSE: CPT

## 2022-02-25 PROCEDURE — 6360000002 HC RX W HCPCS: Performed by: NURSE PRACTITIONER

## 2022-02-25 PROCEDURE — 2580000003 HC RX 258

## 2022-02-25 PROCEDURE — 6360000002 HC RX W HCPCS: Performed by: INTERNAL MEDICINE

## 2022-02-25 PROCEDURE — 6370000000 HC RX 637 (ALT 250 FOR IP): Performed by: INTERNAL MEDICINE

## 2022-02-25 PROCEDURE — 85025 COMPLETE CBC W/AUTO DIFF WBC: CPT

## 2022-02-25 PROCEDURE — 6370000000 HC RX 637 (ALT 250 FOR IP): Performed by: NURSE PRACTITIONER

## 2022-02-25 PROCEDURE — 6370000000 HC RX 637 (ALT 250 FOR IP)

## 2022-02-25 PROCEDURE — 97110 THERAPEUTIC EXERCISES: CPT

## 2022-02-25 PROCEDURE — 99239 HOSP IP/OBS DSCHRG MGMT >30: CPT | Performed by: INTERNAL MEDICINE

## 2022-02-25 PROCEDURE — 97116 GAIT TRAINING THERAPY: CPT

## 2022-02-25 PROCEDURE — 94640 AIRWAY INHALATION TREATMENT: CPT

## 2022-02-25 PROCEDURE — 97535 SELF CARE MNGMENT TRAINING: CPT

## 2022-02-25 RX ORDER — ALBUTEROL SULFATE 2.5 MG/3ML
2.5 SOLUTION RESPIRATORY (INHALATION) EVERY 4 HOURS PRN
Qty: 120 EACH | Refills: 3 | Status: SHIPPED | OUTPATIENT
Start: 2022-02-25

## 2022-02-25 RX ORDER — NICOTINE 21 MG/24HR
1 PATCH, TRANSDERMAL 24 HOURS TRANSDERMAL DAILY
Qty: 30 PATCH | Refills: 3 | Status: SHIPPED | OUTPATIENT
Start: 2022-02-26

## 2022-02-25 RX ORDER — HYDRALAZINE HYDROCHLORIDE 50 MG/1
50 TABLET, FILM COATED ORAL EVERY 8 HOURS SCHEDULED
Qty: 90 TABLET | Refills: 3 | Status: SHIPPED | OUTPATIENT
Start: 2022-02-25

## 2022-02-25 RX ORDER — BUDESONIDE 0.5 MG/2ML
0.5 INHALANT ORAL 2 TIMES DAILY
Qty: 60 EACH | Refills: 3 | Status: SHIPPED | OUTPATIENT
Start: 2022-02-25

## 2022-02-25 RX ORDER — ALBUTEROL SULFATE 2.5 MG/3ML
2.5 SOLUTION RESPIRATORY (INHALATION) 2 TIMES DAILY
Qty: 120 EACH | Refills: 3 | Status: SHIPPED | OUTPATIENT
Start: 2022-02-25

## 2022-02-25 RX ORDER — PREDNISONE 10 MG/1
TABLET ORAL
Qty: 30 TABLET | Refills: 0 | Status: SHIPPED | OUTPATIENT
Start: 2022-02-26 | End: 2022-03-10

## 2022-02-25 RX ADMIN — TIOTROPIUM BROMIDE AND OLODATEROL 2 PUFF: 3.124; 2.736 SPRAY, METERED RESPIRATORY (INHALATION) at 07:50

## 2022-02-25 RX ADMIN — DILTIAZEM HYDROCHLORIDE 30 MG: 30 TABLET, FILM COATED ORAL at 14:36

## 2022-02-25 RX ADMIN — ENOXAPARIN SODIUM 60 MG: 100 INJECTION SUBCUTANEOUS at 11:44

## 2022-02-25 RX ADMIN — ALBUTEROL SULFATE 2.5 MG: 2.5 SOLUTION RESPIRATORY (INHALATION) at 07:50

## 2022-02-25 RX ADMIN — METOPROLOL TARTRATE 50 MG: 50 TABLET, FILM COATED ORAL at 08:58

## 2022-02-25 RX ADMIN — DILTIAZEM HYDROCHLORIDE 30 MG: 30 TABLET, FILM COATED ORAL at 08:58

## 2022-02-25 RX ADMIN — HYDRALAZINE HYDROCHLORIDE 50 MG: 50 TABLET, FILM COATED ORAL at 14:36

## 2022-02-25 RX ADMIN — GABAPENTIN 800 MG: 400 CAPSULE ORAL at 14:36

## 2022-02-25 RX ADMIN — INSULIN GLARGINE 20 UNITS: 100 INJECTION, SOLUTION SUBCUTANEOUS at 09:54

## 2022-02-25 RX ADMIN — GABAPENTIN 800 MG: 400 CAPSULE ORAL at 08:58

## 2022-02-25 RX ADMIN — PREDNISONE 40 MG: 20 TABLET ORAL at 08:57

## 2022-02-25 RX ADMIN — HYDRALAZINE HYDROCHLORIDE 50 MG: 50 TABLET, FILM COATED ORAL at 05:45

## 2022-02-25 RX ADMIN — LISINOPRIL 20 MG: 20 TABLET ORAL at 08:58

## 2022-02-25 RX ADMIN — GUAIFENESIN 600 MG: 600 TABLET, EXTENDED RELEASE ORAL at 08:58

## 2022-02-25 RX ADMIN — BUDESONIDE 500 MCG: 0.5 INHALANT RESPIRATORY (INHALATION) at 07:50

## 2022-02-25 RX ADMIN — PANTOPRAZOLE SODIUM 40 MG: 40 TABLET, DELAYED RELEASE ORAL at 05:45

## 2022-02-25 RX ADMIN — ASPIRIN 81 MG CHEWABLE TABLET 81 MG: 81 TABLET CHEWABLE at 08:57

## 2022-02-25 RX ADMIN — SODIUM CHLORIDE, PRESERVATIVE FREE 10 ML: 5 INJECTION INTRAVENOUS at 09:00

## 2022-02-25 NOTE — PROGRESS NOTES
[address] [address]  INPATIENT PHYSICAL THERAPY  DAILY NOTE  [facility] 7K - 6C-01/215-N    Time In: 1000  Time Out: 1030  Timed Code Treatment Minutes: 30 Minutes  Minutes: 30          Date: 2022  Patient Name: Gabriela Burciaga,  Gender:  female        MRN: [id number]  : 1980  (37 y.o.)     Referring Practitioner: Emily Cordova MD  Diagnosis: Acute respiratory failure with hypoxia  Additional Pertinent Hx: Patient presents with worsening shortness of breath. Family in room to assist with history. Symptoms reportedly began about 4 days prior to admission. She describes worsening dyspnea and increased wheezing over the last few days. No obvious inciting incident noted by patient. She denies any chest pain, fever, chills, cough. She states she does not have increased sputum production at home. She is not on any home O2. She is being treated outpatient for COPD with Symbicort and albuterol inhalers, states she has not seen pulmonology previously and has not had formal PFTs done. Per family, patient has been acting strangely at home with some mild confusion and worsening physical condition. Son states that she has had increased difficulty ambulating throughout the house. Patient also endorsing some diaphoresis at home, but states that her heart rate is typically high but has noticed heart racing the last few days.      Prior Level of Function:  Lives With: Family (With son, daughter and occasionally an 7 month old grandchild)  Type of Home: Apartment  Home Layout: Two level,Bed/Bath upstairs  Home Access: Stairs to enter with rails  Entrance Stairs - Number of Steps: 1 step at the threshhold without a rail; 1 flight of steps to her bedroom and bathroom with 1 rail   Bathroom Shower/Tub: Tub/Shower unit  Bathroom Toilet: Standard  Bathroom Accessibility: Accessible    Receives Help From: Family  ADL Assistance: [address]: Needs assistance  Homemaking Responsibilities: Yes  Ambulation Assistance: Independent  Transfer Assistance: Independent  Active : No  Additional Comments: Pt was not using any AD for ambulation. She has been needing help from family members to do some of the homemaking secondary to fatigue levels. She is independent with self care. Pt only used an inhaler while at home prn. No supplemental O2 used. Restrictions/Precautions:  Restrictions/Precautions: General Precautions,Fall Risk  Position Activity Restriction  Other position/activity restrictions: O2     SUBJECTIVE: RN approved session. Pt resting in bedside chair upon arrival and agreeable to therapy. PAIN: 0/10: denies pain    Vitals: Oxygen: Pt on 3L O2 at rest and at 92%. With walk into [place] pt dopping to 85%, O2 increased to 4L. With a short rest break and cuing for pursed lip breathing O2 improved to 92%. O2 then titrated back down to 3L once pt back in room and able to complete rest of session with sats remaining above 90%    OBJECTIVE:  Bed Mobility:  Not Tested    Transfers:  Sit to Stand: Stand By Assistance  Stand to Sit:Stand By Assistance    Ambulation:  Stand By Assistance  Distance: 100 feet x1   Surface: Level Tile  Device:Rolling Walker  Gait Deviations: Forward Flexed Posture, Slow Zayda, Decreased Step Length Bilaterally, Decreased Gait Speed and Decreased Heel Strike Bilaterally    Balance:  Dynamic Sitting Balance: Modified Independent    Exercise:  Patient was guided in 1 set(s) 10 reps of exercise to both lower extremities. Seated marches, Seated hamstring curls, Seated heel/toe raises, Long arc quads and Seated abduction/adduction. Exercises were completed for increased independence with functional mobility. Functional Outcome Measures: Completed  AM-PAC Inpatient Mobility Raw Score : 18  AM-PAC Inpatient T-Scale Score : 43.63    ASSESSMENT:  Assessment: Patient progressing toward established goals. and Pt tolerated well.  To have O2 eval later today then hopefully discharge to home. Would benefit from home health PT. Activity Tolerance:  Patient tolerance of  treatment: good. Equipment Recommendations:Equipment Needed: No  Discharge Recommendations: Continue to assess pending progress and Home with Home Health PT  Plan: Times per week: 5x GM  Times per day: Daily  Current Treatment Recommendations: Strengthening,Stair training,Endurance Training,Functional Mobility Training,Gait Training    Patient Education  Patient Education: Plan of Care, Transfers, Gait    Goals:  Patient goals : none stated  Short term goals  Time Frame for Short term goals: by discharge  Short term goal 1: bed mobility with HOB flat, no rails, mod I for increased functional ind  Short term goal 2: sit<>stand from various surfaces with LRD mod I for safe transfers  Short term goal 3: ambulate 48' with LRD mod I and good oxygen saturations for safe household distances  Short term goal 4: navigate 10 steps with LRD mod I and good oxygen saturations for safe enter/exit of home  Long term goals  Time Frame for Long term goals : NA d/t short ELOS    Following session, patient left in safe position with all fall risk precautions in place.

## 2022-02-25 NOTE — DISCHARGE SUMMARY
Discharge Summary    Name:  Luisa Marie /Age/Sex: 1980  (37 y.o. female)   MRN & CSN:  [id number] & [id number] Admission Date/Time: 2022  8:17 PM   Attending:  Dmitry Mendez MD Discharging Physician: Dmitry Mendez MD     HPI:     Please, see admission HPI in [address] and patient's hospital course below    Hospital Course:   Luisa Marie is a 37 y.o.  female  who presents with Acute respiratory failure with hypoxia ([address] and the following assessments below, reflect the patient's hospital course     Acute respiratory failure secondary to COPD and Pneumonia     Continue to wean oxygen, incentive spirometry, aggressive bronchodilator treatments,   Prescribed with tapering dose of Prednisone   Patient requires home oxygen      Community-acquired pneumonia/bilateral = Completed treatment with IV rocephin and azithromycin    COPD with acute exacerbation - patient was treated with breathing treatments and IV solumedrol with improvement - pulmonary consulted and recom Albuterol and Pulmicort nebulizer, may need outpatient lung function test and sleep study     Possible Sleep apnea - Pulmonary planning sleep study as OP      Tobacco abuse - Encouraged to quit, continue nicotine patch     Hypertension  Under control continue home medications, added hydralazine     DM type II - resume home regimen of insulin and metformin     Nebulizer note       Luisa Marie was evaluated today and a DME order was entered for a nebulizer compressor in order to administer Albuterol due to the diagnosis of COPD  The need for this equipment and treatment was discussed with the patient and she understands and is in agreement. Home oxygen needed    Patient was evaluated today for the diagnosis of COPD. I entered a DME order for home oxygen because the diagnosis and testing requires the patient to have supplemental oxygen. Condition will improve or be benefited by oxygen use.   The patient is  able to perform good mobility in a home setting and therefore does require the use of a portable oxygen system. The need for this equipment was discussed with the patient and she understands and is in agreement. Patient is hemodynamically stable for DC to Home     The patient expressed appropriate understanding of and agreement with the discharge recommendations, medications, and plan. Consults this admission:  IP CONSULT TO PULMONOLOGY  IP CONSULT TO DIETITIAN  IP CONSULT TO SOCIAL WORK    Discharge Instruction:   Follow up appointments: Pulmonary   Primary care physician:  within 1 to 2 weeks    Diet:  diabetic diet   Activity: activity as tolerated  Disposition: Discharged to:   [x]Home, [][facility], []SNF, []Acute Rehab, []Hospice   Condition on discharge: Stable    Discharge Medications:        Medication List      START taking these medications    * albuterol (2.5 MG/3ML) 0.083% nebulizer solution  Commonly known as: PROVENTIL  Take 3 mLs by nebulization every 4 hours as needed for Wheezing     * albuterol (2.5 MG/3ML) 0.083% nebulizer solution  Commonly known as: PROVENTIL  Take 3 mLs by nebulization 2 times daily     budesonide 0.5 MG/2ML nebulizer suspension  Commonly known as: PULMICORT  Take 2 mLs by nebulization 2 times daily     hydrALAZINE 50 MG tablet  Commonly known as: APRESOLINE  Take 1 tablet by mouth every 8 hours     nicotine 14 MG/24HR  Commonly known as: NICODERM CQ  Place 1 patch onto the skin daily  Start taking on: February 26, 2022     predniSONE 10 MG tablet  Commonly known as: DELTASONE  Take 4 tablets by mouth daily for 3 days, THEN 3 tablets daily for 3 days, THEN 2 tablets daily for 3 days, THEN 1 tablet daily for 3 days. Start taking on: February 26, 2022     tiotropium-olodaterol 2.5-2.5 MCG/ACT Aers  Commonly known as: STIOLTO  Inhale 2 puffs into the lungs daily  Start taking on: February 26, 2022         * This list has 2 medication(s) that are the same as other medications prescribed for you. Read the directions carefully, and ask your doctor or other care provider to review them with you.             CONTINUE taking these medications    amitriptyline 10 MG tablet  Commonly known as: ELAVIL  Take 1 tablet by mouth nightly     aspirin 81 MG tablet     atorvastatin 40 MG tablet  Commonly known as: LIPITOR     Blood Pressure Monitor Kit  1 kit by Does not apply route as needed (so pt may check her blood pressure when needed)     diclofenac sodium 1 % Gel  Commonly known as: VOLTAREN  Apply 2 g topically 2 times daily for 10 days     dilTIAZem 30 MG tablet  Commonly known as: CARDIZEM  Take 1 tablet by mouth 3 times daily     glimepiride 4 MG tablet  Commonly known as: AMARYL     ibuprofen 600 MG tablet  Commonly known as: ADVIL;MOTRIN  Take 1 tablet by mouth 3 times daily as needed for Pain     insulin glargine 100 UNIT/ML injection vial  Commonly known as: LANTUS  Inject 30 Units into the skin 2 times daily     ivabradine 5 MG Tabs tablet  Commonly known as: CORLANOR  Take 1 tablet by mouth 2 times daily (with meals)     lidocaine 5 %  Commonly known as: Lidoderm  Place 1 patch onto the skin daily 12 hours on, 12 hours off.     lisinopril 10 MG tablet  Commonly known as: PRINIVIL;ZESTRIL  take 1 tablet by mouth once daily     metFORMIN 1000 MG tablet  Commonly known as: GLUCOPHAGE     metoprolol 100 MG tablet  Commonly known as: LOPRESSOR  Take 1 tablet by mouth 2 times daily Hold sbp less 110 HR less than 55     Neurontin 800 MG tablet  Generic drug: gabapentin     NovoLOG 100 UNIT/ML injection vial  Generic drug: insulin aspart     OLANZapine 5 MG tablet  Commonly known as: ZYPREXA     ondansetron 4 MG disintegrating tablet  Commonly known as: Zofran ODT  Take 1 tablet by mouth every 8 hours as needed for Nausea or Vomiting     pregabalin 100 MG capsule  Commonly known as: LYRICA     Probiotic Acidophilus Caps  Take 1 capsule by mouth daily        STOP taking these medications    medroxyPROGESTERone 150 MG/ML injection  Commonly known as: DEPO-PROVERA           Where to Get Your Medications      These medications were sent to [address] - [place], [address] [address] [facility] [phone]  [facility]    Phone: [phone]   albuterol (2.5 MG/3ML) 0.083% nebulizer solution  albuterol (2.5 MG/3ML) 0.083% nebulizer solution  budesonide 0.5 MG/2ML nebulizer suspension  hydrALAZINE 50 MG tablet  nicotine 14 MG/24HR  predniSONE 10 MG tablet  tiotropium-olodaterol 2.5-2.5 MCG/ACT Aers         Subjective _ Patient is feeling better today and wants to gohome - denies any chest pain or SOB though she still requires 3 to 4 L of oxygen via NC on ambulation     Objective Findings at Discharge:   BP (!) 142/74   Pulse 114   Temp 98.1 °F (36.7 °C) (Oral)   Resp 16   Ht 5' (1.524 m)   Wt 287 lb 0.6 oz (130.2 kg)   SpO2 93%   BMI 56.06 kg/m²            PHYSICAL EXAM   GEN Awake female, resting in bed in no apparent distress. Appears given age. RESP Clear to auscultation, no wheezes, rales or rhonchi. CARDIO/VAS - S1/S2 auscultated. Regular rate without appreciable murmurs, rubs, or gallops. Peripheral pulses equal bilaterally and palpable. No peripheral edema. GI Abdomen is soft without significant tenderness, masses, or guarding. Bowel sounds are normoactive  MSK No gross joint deformities. Spontaneous movement of all extremities  SKIN Normal coloration, warm, dry. NEURO Cranial nerves appear grossly intact, normal speech, no lateralizing weakness.     BMP/CBC  Recent Labs     02/23/22  1052 02/24/22  0500 02/25/22  0545    141 139   K 5.2 3.9 4.1   CL 98 97* 93*   CO2 29 36* 39*   BUN 17 15 21   CREATININE 0.5 0.4 0.6   WBC 24.2* 23.5* 19.5*   HCT 50.6* 54.4* 52.8*    370 372         Discharge Time of 36 minutes    Electronically signed by Rico Murillo MD on 2/25/2022 at 11:08 AM

## 2022-02-25 NOTE — CARE COORDINATION
Assisted with shower chair and RW; new home O2 per [facility] and new [facility]. Patient was placed on room air for 2 minutes. SpO2 was 83 % on room air at rest. Patients SpO2 was below 89% and qualified for home oxygen. Oxygen was applied at 4 lpm via nasal cannula to maintain a SpO2 between 90-92% while at rest. Actual SpO2 was 90 %. Patient can ambulate for exercise flow rate. Patients was ambulated, SpO2 was 90% on 6 lpm to maintain SpO2 between 90-92% while exercising.     If oxygen need is greater than 4 lpm the SpO2 on 4 lpm was 88%.

## 2022-02-25 NOTE — PROGRESS NOTES
[facility]  Occupational Therapy  Daily Note  Time:   Time In: 1426  Time Out: 1450  Timed Code Treatment Minutes: 24 Minutes  Minutes: 24          Date: 2022  Patient Name: Mirta Mcelroy,   Gender: female      Room: Count includes the [facility]/027-A  MRN: [id number]  : 1980  (37 y.o.)  Referring Practitioner: Dr. Phuong Lynch MD  Diagnosis: Acute Respiratory Failure with Hypoxia  Additional Pertinent Hx: Patient presents with worsening shortness of breath. Family in room to assist with history. Symptoms reportedly began about 4 days prior to admission. She describes worsening dyspnea and increased wheezing over the last few days. No obvious inciting incident noted by patient. She denies any chest pain, fever, chills, cough. She states she does not have increased sputum production at home. She is not on any home O2. She is being treated outpatient for COPD with Symbicort and albuterol inhalers, states she has not seen pulmonology previously and has not had formal PFTs done. Per family, patient has been acting strangely at home with some mild confusion and worsening physical condition. Son states that she has had increased difficulty ambulating throughout the house. Patient also endorsing some diaphoresis at home, but states that her heart rate is typically high but has noticed heart racing the last few days. Restrictions/Precautions:  Restrictions/Precautions: General Precautions,Fall Risk  Position Activity Restriction  Other position/activity restrictions: O2     SUBJECTIVE: RN approved of OT session. Pt sitting EOB on arrival and agreeable to therapy. PAIN: no c/o pain     Vitals: Oxygen: 4L O2 NC     COGNITION: Decreased Insight and Decreased Problem Solving    ADL:   Upper Extremity Dressing: supervision donning pull over shirt. vc's for problem solving with oxygen tubing. Lower Extremity Dressing: Stand By Assistance.   Amira López bilateral socks and bilateral shoes - of OT, ADL's, Energy Conservation and Equipment Education    Goals  Short term goals  Time Frame for Short term goals: By discharge  Short term goal 1: Pt will demonstrate functional mobility within the kitchen or bathroom environment without any AD while carrying light objects with SBA to prepare for doing ADLs and light homemaking. Short term goal 2: Pt will complete a spongebath or dressing herself while following energy conservation strategies while keeping O2 saturation > than 90% with SBA and min cues as needed to increase her activity tolerance for ease of doing her morning routine. Short term goal 3: Pt will complete BUE ROM/light resistance exercises x 10 reps each of 5 sets with cues for pursed lip breathing to increase her endurance for ease of doing ADLs and returning to work. Short term goal 4: Pt will describe 3 ways with which she will stop smoking with verbal cues as needed to increase her energy levels and help her lung function and overall health. Following session, patient left in safe position with all fall risk precautions in place.

## 2022-02-25 NOTE — RT PROTOCOL NOTE
RT Inhaler-Nebulizer Bronchodilator Protocol Note    There is a bronchodilator order in the chart from a provider indicating to follow the RT Bronchodilator Protocol and there is an Initiate RT Inhaler-Nebulizer Bronchodilator Protocol order as well (see protocol at bottom of note). CXR Findings:  No results found. The findings from the last RT Protocol Assessment were as follows:   History Pulmonary Disease: Chronic pulmonary disease (COPD)  Respiratory Pattern: Regular pattern and RR 12-20 bpm  Breath Sounds: Slightly diminished and/or crackles  Cough: Strong, spontaneous, non-productive  Indication for Bronchodilator Therapy: Decreased or absent breath sounds  Bronchodilator Assessment Score: 4    Aerosolized bronchodilator medication orders have been revised according to the RT Inhaler-Nebulizer Bronchodilator Protocol below. Respiratory Therapist to perform RT Therapy Protocol Assessment initially then follow the protocol. Repeat RT Therapy Protocol Assessment PRN for score 0-3 or on second treatment, BID, and PRN for scores above 3. No Indications - adjust the frequency to every 6 hours PRN wheezing or bronchospasm, if no treatments needed after 48 hours then discontinue using Per Protocol order mode. If indication present, adjust the RT bronchodilator orders based on the Bronchodilator Assessment Score as indicated below. Use Inhaler orders unless patient has one or more of the following: on home nebulizer, not able to hold breath for 10 seconds, is not alert and oriented, cannot activate and use MDI correctly, or respiratory rate 25 breaths per minute or more, then use the equivalent nebulizer order(s) with same Frequency and PRN reasons based on the score. If a patient is on this medication at home then do not decrease Frequency below that used at home.     0-3 - enter or revise RT bronchodilator order(s) to equivalent RT Bronchodilator order with Frequency of every 4 hours PRN for wheezing or increased work of breathing using Per Protocol order mode. 4-6 - enter or revise RT Bronchodilator order(s) to two equivalent RT bronchodilator orders with one order with BID Frequency and one order with Frequency of every 4 hours PRN wheezing or increased work of breathing using Per Protocol order mode. 7-10 - enter or revise RT Bronchodilator order(s) to two equivalent RT bronchodilator orders with one order with TID Frequency and one order with Frequency of every 4 hours PRN wheezing or increased work of breathing using Per Protocol order mode. 11-13 - enter or revise RT Bronchodilator order(s) to one equivalent RT bronchodilator order with QID Frequency and an Albuterol order with Frequency of every 4 hours PRN wheezing or increased work of breathing using Per Protocol order mode. Greater than 13 - enter or revise RT Bronchodilator order(s) to one equivalent RT bronchodilator order with every 4 hours Frequency and an Albuterol order with Frequency of every 2 hours PRN wheezing or increased work of breathing using Per Protocol order mode. RT to enter RT Home Evaluation for COPD & MDI Assessment order using Per Protocol order mode.     Electronically signed by Jerlene Barthel, RCP on 2/25/2022 at 7:53 AM

## 2022-02-25 NOTE — PROGRESS NOTES
[facility]    Patient - Al Rodriguez   MRN -  [id number]   [facility] # - [de-identified]   - 1980      Date of Admission -  2022  8:17 PM  Date of evaluation -  2022  Room - / [place] [id number] Hua Mcclelland MD Primary Care Physician - ROBBIE Oneil - CNP   Reason for consult   AECOPD with acute hypoxic respiratory failure  Active Hospital Problem List      Active Hospital Problems    Diagnosis Date Noted    Pneumonia due to infectious organism [J18.9]     JACIEL and COPD overlap syndrome ([facility] [G47.33, J44.9]     Acute respiratory failure with hypoxia ([facility] [facility] [J96.01] 2022     HPI   Al Rodriguez is a 37 y.o. female with a PMH of hypertension, diabetes mellitus type 2, COPD and tobacco abuse presented with worsening shortness of breath and wheezing of 4 days duration. She denies any chest pain, fever, chills, cough. She states she does not have increased sputum production at home. She is not on any home O2. She is being treated outpatient for COPD with Symbicort and albuterol inhalers, states she has not seen pulmonology previously and has not had formal PFTs done. Patient smokes half pack per day she has been smoking for the last 20 years, she denies childhood asthma. Patient's oxygen saturation was  60% on room air. She was initiated on nonrebreather with improvement of SPO2 to 94%. She is eventually weaned down to 6 L NC. Patient received one-time dose of Rocephin and azithromycin for community-acquired pneumonia. CTA chest obtained demonstrated bilateral pneumonia significant reactive adenopathy.  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 Witnessed apneas during sleep noticed: Yes. History of choking and gasping sensation at night time: Yes. History of headaches in the morning:Yes. History of dry mouth in the morning: Yes. History of palpitations during night time/nocturnal awakenings: Yes. History of sweating during night time/nocturnal awakenings: Yes    General:  History of head injury in the past: No.  []  Due to MVA  [] Not due to MVA. Associated loss of consciousness with head injury: No.  History of seizures: No.   Rest less legs syndrome symptoms:NO  History suggestive of periodic limb movements during sleep: NO  History suggestive of hypnagogic hallucinations: NO  History suggestive of hypnopompic hallucinations: NO  History suggestive of sleep talking:NO  History suggestive of sleep walking:NO  History suggestive of bruxism: No. [] No mouth guard. [] Uses mouth guard. History suggestive of cataplexy: NO  History suggestive of sleep paralysis: NO    History regarding old sleep studies:  Prior history of sleep study: No.  Using CPAP device: No.  Currently using home Oxygen: NO.       Patient considerations:  Is the patient is ambulatory: Yes  Patient is currently using: None of these Wheelchair, [employer] or [employer]. Para/Quadriplegic: NO  Hearing deficit : NO  Claustrophobic: NO  MDD : NO  Blind: NO  Incontinent: NO  Para/Quadraplegi: NO.   Need transportation to and from Sleep Center:NO    [place] Sleepiness Score:   Sitting and readin  Watching TV:3  Sitting inactive in a public place:3  Being a passenger in a motor vehicle for an hour or more:3  Lying down in the afternoon:3  Sitting and talking to someone:1  Sitting quietly after lunch (no alcohol):1  Stopped for a few minutes in traffic while drivin  Total GZBQE:52    Mallampati Score: 4   Neck Circumference:20 Inches.     Past 24 hrs   -SOB slowly improving -Reports feeling better   -On 4 LPM via NC   All other systems reviewed  Past Medical History         Diagnosis Date    Anxiety     Depression     Diabetes mellitus ([facility] [address]     Hyperlipidemia     Hypertension       Past Surgical History           Procedure Laterality Date    ABSCESS DRAINAGE       Diet    ADULT DIET; Regular; 5 carb choices (75 gm/meal)  Allergies    Codeine  Social History     Social History     Socioeconomic History    Marital status: Single     Spouse name: Not on file    Number of children: Not on file    Years of education: Not on file    Highest education level: Not on file   Occupational History    Not on file   Tobacco Use    Smoking status: Current Every Day Smoker     Packs/day: 1.00     Years: 15.00     Pack years: 15.00     Types: Cigarettes    Smokeless tobacco: Never Used   Substance and Sexual Activity    Alcohol use: No    Drug use: No    Sexual activity: Yes     Partners: Male   Other Topics Concern    Not on file   Social History Narrative    Not on file     Social Determinants of Health     Financial Resource Strain:     Difficulty of Paying Living Expenses: Not on file   Food Insecurity:     Worried About Running Out of Food in the Last Year: Not on file    Luis Felipe of Food in the Last Year: Not on file   Transportation Needs:     Lack of Transportation (Medical): Not on file    Lack of Transportation (Non-Medical):  Not on file   Physical Activity:     Days of Exercise per Week: Not on file    Minutes of Exercise per Session: Not on file   Stress:     Feeling of Stress : Not on file   Social Connections:     Frequency of Communication with Friends and Family: Not on file    Frequency of Social Gatherings with Friends and Family: Not on file    Attends Evangelical Services: Not on file    Active Member of Clubs or Organizations: Not on file    Attends Club or Organization Meetings: Not on file    Marital Status: Not on file   Intimate Partner Violence:  Fear of Current or Ex-Partner: Not on file    Emotionally Abused: Not on file    Physically Abused: Not on file    Sexually Abused: Not on file   Housing Stability:     Unable to Pay for Housing in the Last Year: Not on file    Number of Places Lived in the Last Year: Not on file    Unstable Housing in the Last Year: Not on file     Family History          Problem Relation Age of Onset    High Blood Pressure Mother     Heart Disease Mother     High Cholesterol Mother     Depression Mother     Stroke Mother     Diabetes Maternal Aunt     High Blood Pressure Maternal Aunt     High Cholesterol Maternal Aunt      Sleep History     No history but high suspicion of JACIEL per history   Meds    Current Medications    albuterol  2.5 mg Nebulization BID    hydrALAZINE  50 mg Oral 3 times per day    insulin glargine  20 Units SubCUTAneous Daily    tiotropium-olodaterol  2 puff Inhalation Daily    predniSONE  40 mg Oral Daily    budesonide  0.5 mg Nebulization BID    guaiFENesin  600 mg Oral BID    amitriptyline  10 mg Oral Nightly    aspirin  81 mg Oral Daily    atorvastatin  40 mg Oral Nightly    dilTIAZem  30 mg Oral TID    gabapentin  800 mg Oral TID    OLANZapine  10 mg Oral Nightly    sodium chloride flush  10 mL IntraVENous 2 times per day    pantoprazole  40 mg Oral QAM AC    enoxaparin  60 mg SubCUTAneous Q12H    insulin glargine  60 Units SubCUTAneous Nightly    insulin lispro  0-18 Units SubCUTAneous TID WC    insulin lispro  0-9 Units SubCUTAneous Nightly    cefTRIAXone (ROCEPHIN) IV  2,000 mg IntraVENous Q24H    lisinopril  20 mg Oral BID    metoprolol  50 mg Oral BID    nicotine  1 patch TransDERmal Daily     albuterol, hydrALAZINE, melatonin, sodium chloride flush, sodium chloride, ondansetron **OR** ondansetron, polyethylene glycol, acetaminophen **OR** acetaminophen, glucose, glucagon (rDNA), dextrose, dextrose bolus (hypoglycemia) **OR** dextrose bolus (hypoglycemia)  IV 02/25/22  0545    141 139   K 5.2 3.9 4.1   CL 98 97* 93*   CO2 29 36* 39*   BUN 17 15 21   CREATININE 0.5 0.4 0.6   GLUCOSE 232* 86 128*   CALCIUM 8.8 8.5 8.9     LFT  No results for input(s): AST, ALT, ALB, BILITOT, ALKPHOS, LIPASE in the last 72 hours. Invalid input(s): AMYLASE  TROP  Lab Results   Component Value Date    TROPONINT < 0.010 02/18/2022    TROPONINT < 0.010 08/08/2018    TROPONINT < 0.010 07/11/2017     BNP  Lab Results   Component Value Date    PROBNP 2561.0 02/18/2022    PROBNP 10.2 07/11/2017     D-Dimer  Lab Results   Component Value Date    DDIMER 377.00 08/08/2018     Lactic Acid  No results for input(s): LACTA in the last 72 hours. INR  No results for input(s): INR, PROTIME in the last 72 hours. PTT  No results for input(s): APTT in the last 72 hours. Glucose  Recent Labs     02/24/22 2059 02/25/22  0636 02/25/22  1116   POCGLU 311* 104 189*     UA No results for input(s): SPECGRAV, PHUR, COLORU, CLARITYU, MUCUS, PROTEINU, BLOODU, RBCUA, WBCUA, BACTERIA, NITRU, GLUCOSEU, BILIRUBINUR, UROBILINOGEN, KETUA, LABCAST, LABCASTTY, AMORPHOS in the last 72 hours. Invalid input(s): CRYSTALS. PFTs   No records  Echo     Summary   Technically difficult examination. Left ventricular size and systolic function is normal. Ejection fraction   was estimated at 55-60%. LV wall thickness is within normal limits and   there are no obvious wall motion abnormalities. The right ventricular size appears normal with normal systolic function   and wall thickness. Cultures    Procalcitonin  Lab Results   Component Value Date    PROCAL 0.12 02/18/2022    PROCAL 0.05 08/08/2018    PROCAL 0.03 07/11/2017     Radiology    CXR    XR CHEST PORTABLE   2/18/2022   FINDINGS:  There is mild cardiomegaly. .The mediastinum is not widened. There is slightly increased density throughout both lung fields. There are no effusions. . The pulmonary vascularity is increased. No suspicious osseous lesions are present. Impression:  1. Cardiomegaly and possible mild congestive heart failure. 2.. Slightly increased density throughout both lung fields. CT Scans  CTA   2/18/2022  Findings: Uniform density through the thyroid gland. Lymphoid hyperplasia in the superior mediastinum, hilar regions and infracarinal tissues. Adenopathy may be reactive to scattered atelectasis and infiltrate through the lungs bilaterally. There is groundglass opacification centrally in the upper lobes with predominantly atelectasis and consolidative infiltrate scattered through the lower lobes and right middle lobe. Minimal airspace opacification is noted to the lingula. There is no evidence of pulmonary embolism or aortic dissection. Small hernia is evident with moderate volume of air seen into the distal esophagus suggesting underlying gastroesophageal reflux disease, correlate clinically. The included portions of the upper abdomen are notable for hepatomegaly. Axillary lymph nodes are benign appearing. The patient's breast tissues are that of scattered fibroglandular nature. For healthcare maintenance purposes she should be reminded that the [address] [place] [address] of Radiology recommends screening mammography commencing at the age of 37. Impression:  Bilateral pneumonia with significant reactive adenopathy. Follow-up recommended to ensure complete clearing of via chest radiography and subsequent CT examination across the next 6-8 weeks.          (See actual reports for details)    Assessment   -Acute respiratory failure with hypoxia 2/2 COPD exacerbation  -COPD exacerbation  -Right middle lobe and lingular infiltrate/atelectasis  -Tobacco abuse  -Obesity with high suspicion for obstructive sleep apnea  -elevated ProBNP 2561, LVEF 94-87 possible diastolic dysfunction  -Hypertension  -Diabetes mellitus type 2  Recommendations   -Monitor SpO2 wean supplemental O2 to maintain SpO2 >90%  -Agree with empiric ATB's Azithromycin and Rocephin  -Solumedrol 40 gm IV BID transition to PO prednisone 40 mg in AM   -NRT 14 mg per Day advised to continue efforts of tobacco cessation  -Duonebs Q 4 hrs WA   -Pulmicort 500 mcg via neb BID   -Guaifenesin 600 mg PO BID   -Pulmonary hygiene with IS and acapella   -Home O2 eval completed 4 at rest and 6 LPM with exertion    -Stable from Pulmonary standpoint, schedule patient for follow-up at [facility] in 3 months with Full PFT and CT chest with contrast to follow pneumonia and mediastinal LAD approximately 5 days prior to appointment with Cuate To CNP or Dr. Philip Zapata MD  -Will need separate appointment for split night sleep study with follow-up in Sleep clinic 6-8 weeks post sleep study with Dr. Philip Zapata or Arti Thomas PA-C    Case discussed with nurse and patient/family. Questions and concerns addressed. Meds and Orders reviewed. Electronically signed by   ROBBIE Oviedo CNP on 2/25/2022 at 2:58 PM    Addendum by Dr. Beth Stevenson MD:  I have seen and examined the patient independently. Face to face evaluation and examination was performed. The above evaluation and note has been reviewed. Labs and radiographs were reviewed. I Have discussed with Mr. Cuate To CNP about this patient in detail. Physical exam was performed by me. See below for details. More than half of the total time for this encounter spent by me. The above assessment and plan has been reviewed. Please see my modifications mentioned below. My modifications:    Physical Exam:  Constitutional: Patient appears in no distress on 3LPM via nasal cannula. Mouth/Throat: Oropharynx is clear and moist.   Neck: Neck supple. Cardiovascular: S1 and S2 heard. No murmurs. Pulmonary/Chest: Bilateral air entry present. Good breath sounds on both sides, diminished at bases. No wheezes. No rales. Abdominal: Soft. No tenderness. Extremities: Patient exhibits no edema. Neurological: Patient is awake and alert.      She was advised to continue to use incentive spirometer every 4hourly as tolerated  Follow up as above.     Myah Turpin MD 2/25/2022 3:28 PM

## 2022-02-25 NOTE — CARE COORDINATION
2/25/22, 2:48 PM EST    DISCHARGE PLANNING EVALUATION      Notified [facility] of discharge planned for today. [facility] will follow for home care. 2/25/22, 2:48 PM EST    Patient goals/plan/ treatment preferences discussed by  and . Patient goals/plan/ treatment preferences reviewed with patient/ family. Patient/ family verbalize understanding of discharge plan and are in agreement with goal/plan/treatment preferences. Understanding was demonstrated using the teach back method. AVS provided by RN at time of discharge, which includes all necessary medical information pertaining to the patients current course of illness, treatment, post-discharge goals of care, and treatment preferences.     Services After Discharge  Services At/After Discharge: [address])

## 2022-02-25 NOTE — PROGRESS NOTES
A home oxygen evaluation has been completed. [x]Patient is an inpatient. It is expected that the patient will be discharged within the next 48 hours. Qualified provider to write order for home prescription if patient qualifies. Social service/care managers will arrange for home oxygen. If patient is active, arrange for Home Medical supplier to assess for Oxygen Conserving Device per pulse oximetry. []Patient is an outpatient. Results will be faxed to the ordering provider. Qualified provider to write order for home prescription if patient qualifies and arranges for home oxygen. Patient was placed on room air for 2 minutes. SpO2 was 83 % on room air at rest. Patients SpO2 was below 89% and qualified for home oxygen. Oxygen was applied at 4 lpm via nasal cannula to maintain a SpO2 between 90-92% while at rest. Actual SpO2 was 90 %. Patient can ambulate for exercise flow rate. Patients was ambulated, SpO2 was 90% on 6 lpm to maintain SpO2 between 90-92% while exercising. If oxygen need is greater than 4 lpm the SpO2 on 4 lpm was 88%. Note: For any SpO2 at 20% see policy and procedure for possible qualifications.

## 2022-02-25 NOTE — PLAN OF CARE
Problem: RESPIRATORY  Goal: Clear lung sounds  Description: Clear lung sounds  Outcome: Ongoing   Continue with Albuterol, Pulmicort and Stiolto

## 2022-02-25 NOTE — PLAN OF CARE
Problem: Cardiovascular  Goal: No DVT, peripheral vascular complications  Outcome: Ongoing     Problem: Respiratory  Goal: O2 Sat > 90%  Outcome: Ongoing     Problem: Discharge Planning:  Goal: Discharged to appropriate level of care  Description: Discharged to appropriate level of care  Outcome: Ongoing     Problem: Safety:  Goal: Free from accidental physical injury  Description: Free from accidental physical injury  Outcome: Ongoing     Problem: Daily Care:  Goal: Daily care needs are met  Description: Daily care needs are met  Outcome: Ongoing     Problem: Skin Integrity:  Goal: Skin integrity will stabilize  Description: Skin integrity will stabilize  Outcome: Ongoing     Problem: Infection - Central Venous Catheter-Associated Bloodstream Infection:  Goal: Will show no infection signs and symptoms  Description: Will show no infection signs and symptoms  Outcome: Ongoing     Problem: Falls - Risk of:  Goal: Will remain free from falls  Description: Will remain free from falls  Outcome: Ongoing  Goal: Absence of physical injury  Description: Absence of physical injury  Outcome: Ongoing     Problem: SAFETY  Goal: Free from accidental physical injury  Outcome: Ongoing     Problem: DISCHARGE BARRIERS  Goal: Patient's continuum of care needs are met  Outcome: Ongoing

## 2022-02-27 ENCOUNTER — HOSPITAL ENCOUNTER (INPATIENT)
Age: 42
LOS: 3 days | Discharge: HOME HEALTH | DRG: 133 | End: 2022-03-02
Attending: FAMILY MEDICINE | Admitting: STUDENT IN AN ORGANIZED HEALTH CARE EDUCATION/TRAINING PROGRAM
Payer: COMMERCIAL

## 2022-02-27 ENCOUNTER — APPOINTMENT (OUTPATIENT)
Dept: GENERAL RADIOLOGY | Age: 42
DRG: 133 | End: 2022-02-27
Payer: COMMERCIAL

## 2022-02-27 DIAGNOSIS — J44.9 CHRONIC OBSTRUCTIVE PULMONARY DISEASE, UNSPECIFIED COPD TYPE (HCC): ICD-10-CM

## 2022-02-27 DIAGNOSIS — R09.02 HYPOXIA: ICD-10-CM

## 2022-02-27 DIAGNOSIS — G47.33 OSA AND COPD OVERLAP SYNDROME (HCC): ICD-10-CM

## 2022-02-27 DIAGNOSIS — E66.01 MORBID OBESITY DUE TO EXCESS CALORIES (HCC): ICD-10-CM

## 2022-02-27 DIAGNOSIS — J96.02 ACUTE RESPIRATORY FAILURE WITH HYPOXIA AND HYPERCAPNIA (HCC): ICD-10-CM

## 2022-02-27 DIAGNOSIS — J18.9 PNEUMONIA OF BOTH LUNGS DUE TO INFECTIOUS ORGANISM, UNSPECIFIED PART OF LUNG: Primary | ICD-10-CM

## 2022-02-27 DIAGNOSIS — J96.01 ACUTE RESPIRATORY FAILURE WITH HYPOXIA AND HYPERCAPNIA (HCC): ICD-10-CM

## 2022-02-27 DIAGNOSIS — J44.9 OSA AND COPD OVERLAP SYNDROME (HCC): ICD-10-CM

## 2022-02-27 LAB
ANION GAP SERPL CALCULATED.3IONS-SCNC: 7 MEQ/L (ref 8–16)
BASE EXCESS (CALCULATED): 11.5 MMOL/L (ref -2.5–2.5)
BASOPHILS # BLD: 0.4 %
BASOPHILS ABSOLUTE: 0.1 THOU/MM3 (ref 0–0.1)
BUN BLDV-MCNC: 19 MG/DL (ref 7–22)
CALCIUM SERPL-MCNC: 9 MG/DL (ref 8.5–10.5)
CHLORIDE BLD-SCNC: 92 MEQ/L (ref 98–111)
CO2: 40 MEQ/L (ref 23–33)
COLLECTED BY:: ABNORMAL
CREAT SERPL-MCNC: 0.6 MG/DL (ref 0.4–1.2)
DEVICE: ABNORMAL
EKG ATRIAL RATE: 130 BPM
EKG P AXIS: 73 DEGREES
EKG P-R INTERVAL: 136 MS
EKG Q-T INTERVAL: 286 MS
EKG QRS DURATION: 64 MS
EKG QTC CALCULATION (BAZETT): 420 MS
EKG R AXIS: 53 DEGREES
EKG T AXIS: 83 DEGREES
EKG VENTRICULAR RATE: 130 BPM
EOSINOPHIL # BLD: 0.2 %
EOSINOPHILS ABSOLUTE: 0 THOU/MM3 (ref 0–0.4)
ERYTHROCYTE [DISTWIDTH] IN BLOOD BY AUTOMATED COUNT: 13.7 % (ref 11.5–14.5)
ERYTHROCYTE [DISTWIDTH] IN BLOOD BY AUTOMATED COUNT: 52 FL (ref 35–45)
FLU A ANTIGEN: NEGATIVE
FLU B ANTIGEN: NEGATIVE
GFR SERPL CREATININE-BSD FRML MDRD: > 90 ML/MIN/1.73M2
GLUCOSE BLD-MCNC: 171 MG/DL (ref 70–108)
GLUCOSE BLD-MCNC: 187 MG/DL (ref 70–108)
GLUCOSE BLD-MCNC: 220 MG/DL (ref 70–108)
HCO3: 42 MMOL/L (ref 23–28)
HCT VFR BLD CALC: 54.2 % (ref 37–47)
HEMOGLOBIN: 16.1 GM/DL (ref 12–16)
IFIO2: 75
IMMATURE GRANS (ABS): 0.22 THOU/MM3 (ref 0–0.07)
IMMATURE GRANULOCYTES: 0.9 %
LYMPHOCYTES # BLD: 8.8 %
LYMPHOCYTES ABSOLUTE: 2.1 THOU/MM3 (ref 1–4.8)
MCH RBC QN AUTO: 30.3 PG (ref 26–33)
MCHC RBC AUTO-ENTMCNC: 29.7 GM/DL (ref 32.2–35.5)
MCV RBC AUTO: 101.9 FL (ref 81–99)
MONOCYTES # BLD: 5.1 %
MONOCYTES ABSOLUTE: 1.2 THOU/MM3 (ref 0.4–1.3)
NUCLEATED RED BLOOD CELLS: 0 /100 WBC
O2 SATURATION: 87 %
OSMOLALITY CALCULATION: 284.7 MOSMOL/KG (ref 275–300)
PCO2: 76 MMHG (ref 35–45)
PH BLOOD GAS: 7.35 (ref 7.35–7.45)
PLATELET # BLD: 406 THOU/MM3 (ref 130–400)
PLATELET ESTIMATE: ADEQUATE
PMV BLD AUTO: 9.3 FL (ref 9.4–12.4)
PO2: 58 MMHG (ref 71–104)
POTASSIUM REFLEX MAGNESIUM: 5 MEQ/L (ref 3.5–5.2)
PREGNANCY, SERUM: NEGATIVE
PRO-BNP: 422.1 PG/ML (ref 0–450)
PROCALCITONIN: 0.07 NG/ML (ref 0.01–0.09)
RBC # BLD: 5.32 MILL/MM3 (ref 4.2–5.4)
SARS-COV-2, NAAT: NOT  DETECTED
SCAN OF BLOOD SMEAR: NORMAL
SEG NEUTROPHILS: 84.6 %
SEGMENTED NEUTROPHILS ABSOLUTE COUNT: 20.5 THOU/MM3 (ref 1.8–7.7)
SODIUM BLD-SCNC: 139 MEQ/L (ref 135–145)
SOURCE, BLOOD GAS: ABNORMAL
TROPONIN T: < 0.01 NG/ML
WBC # BLD: 24.2 THOU/MM3 (ref 4.8–10.8)

## 2022-02-27 PROCEDURE — 6360000002 HC RX W HCPCS: Performed by: STUDENT IN AN ORGANIZED HEALTH CARE EDUCATION/TRAINING PROGRAM

## 2022-02-27 PROCEDURE — 87804 INFLUENZA ASSAY W/OPTIC: CPT

## 2022-02-27 PROCEDURE — 87635 SARS-COV-2 COVID-19 AMP PRB: CPT

## 2022-02-27 PROCEDURE — 2580000003 HC RX 258: Performed by: STUDENT IN AN ORGANIZED HEALTH CARE EDUCATION/TRAINING PROGRAM

## 2022-02-27 PROCEDURE — 93010 ELECTROCARDIOGRAM REPORT: CPT | Performed by: INTERNAL MEDICINE

## 2022-02-27 PROCEDURE — 99222 1ST HOSP IP/OBS MODERATE 55: CPT | Performed by: INTERNAL MEDICINE

## 2022-02-27 PROCEDURE — 94640 AIRWAY INHALATION TREATMENT: CPT

## 2022-02-27 PROCEDURE — 80048 BASIC METABOLIC PNL TOTAL CA: CPT

## 2022-02-27 PROCEDURE — 6370000000 HC RX 637 (ALT 250 FOR IP): Performed by: STUDENT IN AN ORGANIZED HEALTH CARE EDUCATION/TRAINING PROGRAM

## 2022-02-27 PROCEDURE — 82803 BLOOD GASES ANY COMBINATION: CPT

## 2022-02-27 PROCEDURE — 36600 WITHDRAWAL OF ARTERIAL BLOOD: CPT

## 2022-02-27 PROCEDURE — 96374 THER/PROPH/DIAG INJ IV PUSH: CPT

## 2022-02-27 PROCEDURE — 2700000000 HC OXYGEN THERAPY PER DAY

## 2022-02-27 PROCEDURE — 82948 REAGENT STRIP/BLOOD GLUCOSE: CPT

## 2022-02-27 PROCEDURE — 85025 COMPLETE CBC W/AUTO DIFF WBC: CPT

## 2022-02-27 PROCEDURE — 99285 EMERGENCY DEPT VISIT HI MDM: CPT

## 2022-02-27 PROCEDURE — 84145 PROCALCITONIN (PCT): CPT

## 2022-02-27 PROCEDURE — 2060000000 HC ICU INTERMEDIATE R&B

## 2022-02-27 PROCEDURE — 94669 MECHANICAL CHEST WALL OSCILL: CPT

## 2022-02-27 PROCEDURE — 94761 N-INVAS EAR/PLS OXIMETRY MLT: CPT

## 2022-02-27 PROCEDURE — 84703 CHORIONIC GONADOTROPIN ASSAY: CPT

## 2022-02-27 PROCEDURE — 83880 ASSAY OF NATRIURETIC PEPTIDE: CPT

## 2022-02-27 PROCEDURE — 93005 ELECTROCARDIOGRAM TRACING: CPT | Performed by: STUDENT IN AN ORGANIZED HEALTH CARE EDUCATION/TRAINING PROGRAM

## 2022-02-27 PROCEDURE — 84484 ASSAY OF TROPONIN QUANT: CPT

## 2022-02-27 PROCEDURE — 71045 X-RAY EXAM CHEST 1 VIEW: CPT

## 2022-02-27 RX ORDER — DEXTROSE MONOHYDRATE 25 G/50ML
12.5 INJECTION, SOLUTION INTRAVENOUS PRN
Status: DISCONTINUED | OUTPATIENT
Start: 2022-02-27 | End: 2022-02-27 | Stop reason: RX

## 2022-02-27 RX ORDER — METOPROLOL TARTRATE 100 MG/1
100 TABLET ORAL 2 TIMES DAILY
Status: DISCONTINUED | OUTPATIENT
Start: 2022-02-27 | End: 2022-03-02 | Stop reason: HOSPADM

## 2022-02-27 RX ORDER — ATORVASTATIN CALCIUM 40 MG/1
40 TABLET, FILM COATED ORAL NIGHTLY
Status: DISCONTINUED | OUTPATIENT
Start: 2022-02-27 | End: 2022-03-02 | Stop reason: HOSPADM

## 2022-02-27 RX ORDER — HYDRALAZINE HYDROCHLORIDE 50 MG/1
50 TABLET, FILM COATED ORAL EVERY 8 HOURS SCHEDULED
Status: DISCONTINUED | OUTPATIENT
Start: 2022-02-27 | End: 2022-03-02 | Stop reason: HOSPADM

## 2022-02-27 RX ORDER — SODIUM CHLORIDE 9 MG/ML
INJECTION, SOLUTION INTRAVENOUS CONTINUOUS
Status: DISCONTINUED | OUTPATIENT
Start: 2022-02-27 | End: 2022-03-02 | Stop reason: HOSPADM

## 2022-02-27 RX ORDER — SODIUM CHLORIDE 9 MG/ML
25 INJECTION, SOLUTION INTRAVENOUS PRN
Status: DISCONTINUED | OUTPATIENT
Start: 2022-02-27 | End: 2022-02-28 | Stop reason: SDUPTHER

## 2022-02-27 RX ORDER — PREDNISONE 20 MG/1
20 TABLET ORAL 2 TIMES DAILY
Status: DISCONTINUED | OUTPATIENT
Start: 2022-02-28 | End: 2022-02-28

## 2022-02-27 RX ORDER — ASPIRIN 81 MG/1
81 TABLET ORAL DAILY
Status: DISCONTINUED | OUTPATIENT
Start: 2022-02-27 | End: 2022-03-02 | Stop reason: HOSPADM

## 2022-02-27 RX ORDER — PREDNISONE 20 MG/1
20 TABLET ORAL 2 TIMES DAILY
Status: DISCONTINUED | OUTPATIENT
Start: 2022-02-27 | End: 2022-02-27

## 2022-02-27 RX ORDER — ONDANSETRON 2 MG/ML
4 INJECTION INTRAMUSCULAR; INTRAVENOUS EVERY 6 HOURS PRN
Status: CANCELLED | OUTPATIENT
Start: 2022-02-27

## 2022-02-27 RX ORDER — NICOTINE 21 MG/24HR
1 PATCH, TRANSDERMAL 24 HOURS TRANSDERMAL DAILY
Status: DISCONTINUED | OUTPATIENT
Start: 2022-02-27 | End: 2022-03-02 | Stop reason: HOSPADM

## 2022-02-27 RX ORDER — IPRATROPIUM BROMIDE AND ALBUTEROL SULFATE 2.5; .5 MG/3ML; MG/3ML
2 SOLUTION RESPIRATORY (INHALATION)
Status: DISCONTINUED | OUTPATIENT
Start: 2022-02-27 | End: 2022-02-27

## 2022-02-27 RX ORDER — SODIUM CHLORIDE 0.9 % (FLUSH) 0.9 %
10 SYRINGE (ML) INJECTION PRN
Status: DISCONTINUED | OUTPATIENT
Start: 2022-02-27 | End: 2022-02-28 | Stop reason: SDUPTHER

## 2022-02-27 RX ORDER — ALBUTEROL SULFATE 2.5 MG/3ML
2.5 SOLUTION RESPIRATORY (INHALATION) 2 TIMES DAILY
Status: DISCONTINUED | OUTPATIENT
Start: 2022-02-27 | End: 2022-02-27

## 2022-02-27 RX ORDER — ACETAMINOPHEN 650 MG/1
650 SUPPOSITORY RECTAL EVERY 6 HOURS PRN
Status: DISCONTINUED | OUTPATIENT
Start: 2022-02-27 | End: 2022-03-02 | Stop reason: HOSPADM

## 2022-02-27 RX ORDER — LISINOPRIL 10 MG/1
10 TABLET ORAL DAILY
Status: DISCONTINUED | OUTPATIENT
Start: 2022-02-27 | End: 2022-03-02 | Stop reason: HOSPADM

## 2022-02-27 RX ORDER — DEXAMETHASONE SODIUM PHOSPHATE 4 MG/ML
6 INJECTION, SOLUTION INTRA-ARTICULAR; INTRALESIONAL; INTRAMUSCULAR; INTRAVENOUS; SOFT TISSUE ONCE
Status: COMPLETED | OUTPATIENT
Start: 2022-02-27 | End: 2022-02-27

## 2022-02-27 RX ORDER — OLANZAPINE 10 MG/1
10 TABLET ORAL NIGHTLY
Status: DISCONTINUED | OUTPATIENT
Start: 2022-02-27 | End: 2022-03-02 | Stop reason: HOSPADM

## 2022-02-27 RX ORDER — GABAPENTIN 400 MG/1
800 CAPSULE ORAL 3 TIMES DAILY
Status: DISCONTINUED | OUTPATIENT
Start: 2022-02-27 | End: 2022-03-02 | Stop reason: HOSPADM

## 2022-02-27 RX ORDER — ONDANSETRON 4 MG/1
4 TABLET, ORALLY DISINTEGRATING ORAL EVERY 8 HOURS PRN
Status: CANCELLED | OUTPATIENT
Start: 2022-02-27

## 2022-02-27 RX ORDER — ACETAMINOPHEN 325 MG/1
650 TABLET ORAL EVERY 6 HOURS PRN
Status: DISCONTINUED | OUTPATIENT
Start: 2022-02-27 | End: 2022-03-02 | Stop reason: HOSPADM

## 2022-02-27 RX ORDER — POLYETHYLENE GLYCOL 3350 17 G/17G
17 POWDER, FOR SOLUTION ORAL DAILY PRN
Status: DISCONTINUED | OUTPATIENT
Start: 2022-02-27 | End: 2022-03-02 | Stop reason: HOSPADM

## 2022-02-27 RX ORDER — DEXTROSE MONOHYDRATE 50 MG/ML
100 INJECTION, SOLUTION INTRAVENOUS PRN
Status: DISCONTINUED | OUTPATIENT
Start: 2022-02-27 | End: 2022-03-02 | Stop reason: HOSPADM

## 2022-02-27 RX ORDER — BUDESONIDE 0.5 MG/2ML
0.5 INHALANT ORAL 2 TIMES DAILY
Status: DISCONTINUED | OUTPATIENT
Start: 2022-02-27 | End: 2022-02-28

## 2022-02-27 RX ORDER — ONDANSETRON 4 MG/1
4 TABLET, ORALLY DISINTEGRATING ORAL EVERY 8 HOURS PRN
Status: DISCONTINUED | OUTPATIENT
Start: 2022-02-27 | End: 2022-03-02 | Stop reason: HOSPADM

## 2022-02-27 RX ORDER — ALBUTEROL SULFATE 2.5 MG/3ML
2.5 SOLUTION RESPIRATORY (INHALATION) 3 TIMES DAILY
Status: DISCONTINUED | OUTPATIENT
Start: 2022-02-27 | End: 2022-02-28

## 2022-02-27 RX ORDER — SODIUM CHLORIDE 0.9 % (FLUSH) 0.9 %
5-40 SYRINGE (ML) INJECTION EVERY 12 HOURS SCHEDULED
Status: DISCONTINUED | OUTPATIENT
Start: 2022-02-27 | End: 2022-02-28 | Stop reason: SDUPTHER

## 2022-02-27 RX ORDER — INSULIN GLARGINE 100 [IU]/ML
50 INJECTION, SOLUTION SUBCUTANEOUS NIGHTLY
Status: DISCONTINUED | OUTPATIENT
Start: 2022-02-27 | End: 2022-03-02 | Stop reason: HOSPADM

## 2022-02-27 RX ORDER — AMITRIPTYLINE HYDROCHLORIDE 10 MG/1
10 TABLET, FILM COATED ORAL NIGHTLY
Status: DISCONTINUED | OUTPATIENT
Start: 2022-02-27 | End: 2022-03-02 | Stop reason: HOSPADM

## 2022-02-27 RX ORDER — ALBUTEROL SULFATE 2.5 MG/3ML
2.5 SOLUTION RESPIRATORY (INHALATION) EVERY 4 HOURS PRN
Status: DISCONTINUED | OUTPATIENT
Start: 2022-02-27 | End: 2022-02-28

## 2022-02-27 RX ORDER — NICOTINE POLACRILEX 4 MG
15 LOZENGE BUCCAL PRN
Status: DISCONTINUED | OUTPATIENT
Start: 2022-02-27 | End: 2022-03-02 | Stop reason: HOSPADM

## 2022-02-27 RX ORDER — IPRATROPIUM BROMIDE AND ALBUTEROL SULFATE 2.5; .5 MG/3ML; MG/3ML
2 SOLUTION RESPIRATORY (INHALATION) ONCE
Status: COMPLETED | OUTPATIENT
Start: 2022-02-27 | End: 2022-02-27

## 2022-02-27 RX ADMIN — ALBUTEROL SULFATE 2.5 MG: 2.5 SOLUTION RESPIRATORY (INHALATION) at 15:48

## 2022-02-27 RX ADMIN — METOPROLOL TARTRATE 100 MG: 100 TABLET, FILM COATED ORAL at 15:31

## 2022-02-27 RX ADMIN — DILTIAZEM HYDROCHLORIDE 30 MG: 30 TABLET, FILM COATED ORAL at 20:46

## 2022-02-27 RX ADMIN — ENOXAPARIN SODIUM 40 MG: 100 INJECTION SUBCUTANEOUS at 15:32

## 2022-02-27 RX ADMIN — DEXAMETHASONE SODIUM PHOSPHATE 6 MG: 4 INJECTION, SOLUTION INTRA-ARTICULAR; INTRALESIONAL; INTRAMUSCULAR; INTRAVENOUS; SOFT TISSUE at 11:00

## 2022-02-27 RX ADMIN — ALBUTEROL SULFATE 2.5 MG: 2.5 SOLUTION RESPIRATORY (INHALATION) at 20:58

## 2022-02-27 RX ADMIN — ATORVASTATIN CALCIUM 40 MG: 40 TABLET, FILM COATED ORAL at 20:46

## 2022-02-27 RX ADMIN — VANCOMYCIN HYDROCHLORIDE 2250 MG: 1 INJECTION, POWDER, LYOPHILIZED, FOR SOLUTION INTRAVENOUS at 17:15

## 2022-02-27 RX ADMIN — AMITRIPTYLINE HYDROCHLORIDE 10 MG: 10 TABLET, FILM COATED ORAL at 20:46

## 2022-02-27 RX ADMIN — IPRATROPIUM BROMIDE AND ALBUTEROL SULFATE 2 AMPULE: .5; 3 SOLUTION RESPIRATORY (INHALATION) at 10:58

## 2022-02-27 RX ADMIN — BUDESONIDE 500 MCG: 0.5 INHALANT RESPIRATORY (INHALATION) at 15:48

## 2022-02-27 RX ADMIN — SODIUM CHLORIDE: 9 INJECTION, SOLUTION INTRAVENOUS at 12:22

## 2022-02-27 RX ADMIN — INSULIN GLARGINE 50 UNITS: 100 INJECTION, SOLUTION SUBCUTANEOUS at 21:01

## 2022-02-27 RX ADMIN — LISINOPRIL 10 MG: 10 TABLET ORAL at 15:32

## 2022-02-27 RX ADMIN — METFORMIN HYDROCHLORIDE 2000 MG: 500 TABLET ORAL at 17:21

## 2022-02-27 RX ADMIN — GABAPENTIN 800 MG: 400 CAPSULE ORAL at 20:46

## 2022-02-27 RX ADMIN — TIOTROPIUM BROMIDE AND OLODATEROL 2 PUFF: 3.124; 2.736 SPRAY, METERED RESPIRATORY (INHALATION) at 15:48

## 2022-02-27 RX ADMIN — HYDRALAZINE HYDROCHLORIDE 50 MG: 50 TABLET, FILM COATED ORAL at 20:46

## 2022-02-27 RX ADMIN — CEFEPIME HYDROCHLORIDE 2000 MG: 2 INJECTION, POWDER, FOR SOLUTION INTRAVENOUS at 12:22

## 2022-02-27 RX ADMIN — INSULIN LISPRO 1 UNITS: 100 INJECTION, SOLUTION INTRAVENOUS; SUBCUTANEOUS at 18:53

## 2022-02-27 RX ADMIN — HYDRALAZINE HYDROCHLORIDE 50 MG: 50 TABLET, FILM COATED ORAL at 15:32

## 2022-02-27 RX ADMIN — OLANZAPINE 10 MG: 10 TABLET, FILM COATED ORAL at 20:46

## 2022-02-27 RX ADMIN — GABAPENTIN 800 MG: 400 CAPSULE ORAL at 15:32

## 2022-02-27 RX ADMIN — DILTIAZEM HYDROCHLORIDE 30 MG: 30 TABLET, FILM COATED ORAL at 15:31

## 2022-02-27 RX ADMIN — ASPIRIN 81 MG: 81 TABLET, COATED ORAL at 15:31

## 2022-02-27 ASSESSMENT — ENCOUNTER SYMPTOMS
SINUS PAIN: 0
COLOR CHANGE: 0
WHEEZING: 1
SINUS PRESSURE: 0
COUGH: 1
NAUSEA: 0
BACK PAIN: 0
ABDOMINAL PAIN: 0
CONSTIPATION: 0
VOMITING: 0
CHEST TIGHTNESS: 0
SORE THROAT: 0
DIARRHEA: 0
SHORTNESS OF BREATH: 1

## 2022-02-27 NOTE — H&P
Hospitalist - History & Physical      Patient: Manolo López    Unit/Bed:4A-10/010-A  YOB: 1980  MRN: [id number]   Acct: [de-identified]   PCP: ROBBIE Crump CNP    Date of Service: Pt seen/examined on 02/27/22  and Admitted to Inpatient with expected LOS greater than two midnights due to medical therapy. Chief Complaint:  SOB w/hypoxia     Assessment and Plan:    #Acute Hypoxic, chronic hypercapnic respiratory failure likely 2/2 PNA/COPDe: Presented with worsening SOB and hypoxia s/p recent d/c home 1 day ago. Influenza A/B and COVID-19 negative. CXR demonstrates worsening b/l Patchy pulmonary opacities, probable small Rt pleural effusion. Pt given Vanc/Cefepime, Duoneb, and Decadron 6mg. IVF at 100cc/hr. Placed on [facility] 75%/60L w/SpO2>90%. Pt noted to have Bi-apical expiratory wheezes. ABG demonstrates 7.35/76/58/42, Chronic (compensated) respiratory acidosis w/Metabolic alkalosis. - C/w RT therapy and pulmonary hygiene   - IS and Acapella   - C/w home inhalers and treatments   - Wean as tolerated w/goal SpO2 >90%    #B/l Multi-focal PNA, worsening/recurrent CAP w/? parapneumonic effusion of RLL vs. HAP/Aspiration?: Pt recently d/c'd from hospital on 2/25/2022 for Acute Hypoxic RF/COPDe 2/2 PNA, treated w/Rocephin + Azithromycin along w/a steroid taper, discharged w/4LNC. Presents w/worsening SOB and respiratory failure, WBC elevated at 24.2 (above d/c levels), SIRS 3/4 although afebrile and non-toxic appearing, mild Sepsis component resolved from previous admission. Procal 0.07, lactic acid pending.   - Started on Vanc/Cefepime in ED -> will c/w Vanc/Zosyn while IP for better anaerobic coverage at this time    #? COPDe likely 2/2 worsening/recurrent PNA in setting of ? COPD/JACIEL overlap: Denies any previous PFTs, but being treated for COPD by outpatient PCP.  Pt was scheduled to f/u w/[facility] pulmonology in 3mos w/full PFT + CT Chest w/contrast to follow PNA and mediastinal LAD (likely reactive) and a separate appt for split night sleep study. Pt was d/c on a steroid taper from previous d/c. On Proventil, Stiolto, Pulmicort at home. Pt has wheezes but it doesn't feel like a COPDe  - Received Decadron 6mg in ED, Will start Prednisone taper back tomorrow 40mg x 1 dose followed by 30mg x 3 days ->20mg x3 days ->10mg x 3 days   - C/w home medication inhalers / nebulizer      #IDDMII: Last A1c 5.6%. Pt on Lantus 70 units daily, short acting lispro w/meals and SSI. She is also on pioglitazone, Metformin, Januvia, glipizide at home. Given the steroids and infection, will expect a higher BG than normal.   - Lantus 50 units daily, adjust dose as medically indicated  - We will initiate sliding scale, add on mealtime short acting insulin as indicated  - Hypoglycemia protocols, Accu-Cheks  - Held oral BM medications except Metformin     #Secondary Polycythemia likely due to chronic hypoxia: Hgb 16.1 on arrival. Has had previously charted elevated hemoglobins. Peripheral smear pending   - COPD treatment per above     #Depression/anxiety: On home BuSpar, gabapentin  - Continue home medications     #HTN: On home lisinopril 10 mg daily  - Continue home lisinopril     #HLD: On home Lipitor 40 mg  - Continue home statin    History Of Present Illness:      43yo F w/significant PMHx of HTN, IDDM2, Tobacco abuse (1/2 PPD x 20yrs), Morbid obesity, Anxiety/depression, Secondary Polycythemia, and JACIEL/COPD overlap syndrome who presents to the [facility] ED for SOB/hypoxia. Pt recently d/c'd from hospital on 2/25/2022 for Acute Hypoxic RF/COPDe 2/2 PNA, treated w/Rocephin + Azithromycin along w/a steroid taper, discharged w/4LNC. Pt's son called 911 d/t pt not \"breathing adequately\", Pt SpO2 78% on 6LNC, pt tachycardic. Of note, on recent admission, pulmonology was seeing pt, she was started on Solumedrol ->Prednisone PO (now on taper), had elevated pro-BNP 2561, LVEF 55-60% (possible diastolic dsfxn?).  Home O2 eval completed then w/4L at rest + 6L w/exertion. Pt was scheduled to f/u w/[facility] pulmonoogy in 3mos w/full PFT + CT Chest w/contrast to follow PNA and mediastinal LAD (likely reactive) and a separate appt for split night sleep study.      ED Course: Afebrile, /83, , RR 26. WBC 24.2 (elevated from time of d/c 19.5), Hb/Hct 16.1/54.2, .9, RDW 52, Plts 406, Abs Segs 20.5. CO2 40, procal 0.07, pro-, trops x1 negative, Influenza A/B and COVID-19 negative. CXR demonstrates worsening b/l Patchy pulmonary opacities, probable small Rt pleural effusion. Pt given Vanc/Cefepime, Duoneb, and Decadron 6mg. IVF at 100cc/hr. Placed on 1900 [facility] 75%/60L w/SpO2>90%. Pt noted to have Bi-apical expiratory wheezes. ABG demonstrates 7.35/76/58/42, Chronic (compensated) respiratory acidosis w/Metabolic alkalosis. Although pt meets SIRS criteria 3/4 w/suspected source being PNA, pt does not appear to be septic/acutely ill. Past Medical History:  has a past medical history of Anxiety, Depression, Diabetes mellitus ([address] Hyperlipidemia, and Hypertension. Past Surgical History:  has a past surgical history that includes Abscess Drainage. Home Medications:   No current facility-administered medications on file prior to encounter. Current Outpatient Medications on File Prior to Encounter   Medication Sig Dispense Refill    budesonide (PULMICORT) 0.5 MG/2ML nebulizer suspension Take 2 mLs by nebulization 2 times daily 60 each 3    tiotropium-olodaterol (STIOLTO) 2.5-2.5 MCG/ACT AERS Inhale 2 puffs into the lungs daily 1 each 2    hydrALAZINE (APRESOLINE) 50 MG tablet Take 1 tablet by mouth every 8 hours 90 tablet 3    predniSONE (DELTASONE) 10 MG tablet Take 4 tablets by mouth daily for 3 days, THEN 3 tablets daily for 3 days, THEN 2 tablets daily for 3 days, THEN 1 tablet daily for 3 days.  30 tablet 0    nicotine (NICODERM CQ) 14 MG/24HR Place 1 patch onto the skin daily 30 patch 3    albuterol (PROVENTIL) (2.5 MG/3ML) 0.083% nebulizer solution Take 3 mLs by nebulization every 4 hours as needed for Wheezing 120 each 3    albuterol (PROVENTIL) (2.5 MG/3ML) 0.083% nebulizer solution Take 3 mLs by nebulization 2 times daily 120 each 3    pregabalin (LYRICA) 100 MG capsule Take 200 mg by mouth 2 times daily.  ibuprofen (ADVIL;MOTRIN) 600 MG tablet Take 1 tablet by mouth 3 times daily as needed for Pain 30 tablet 0    lidocaine (LIDODERM) 5 % Place 1 patch onto the skin daily 12 hours on, 12 hours off. 30 patch 0    ondansetron (ZOFRAN ODT) 4 MG disintegrating tablet Take 1 tablet by mouth every 8 hours as needed for Nausea or Vomiting 6 tablet 0    Lactobacillus (PROBIOTIC ACIDOPHILUS) CAPS Take 1 capsule by mouth daily 30 capsule 0    metoprolol (LOPRESSOR) 100 MG tablet Take 1 tablet by mouth 2 times daily Hold sbp less 110 HR less than 55 180 tablet 3    diltiazem (CARDIZEM) 30 MG tablet Take 1 tablet by mouth 3 times daily 270 tablet 3    ivabradine (CORLANOR) 5 MG TABS tablet Take 1 tablet by mouth 2 times daily (with meals) 180 tablet 3    diclofenac sodium 1 % GEL Apply 2 g topically 2 times daily for 10 days 1 Tube 3    amitriptyline (ELAVIL) 10 MG tablet Take 1 tablet by mouth nightly 30 tablet 4    lisinopril (PRINIVIL;ZESTRIL) 10 MG tablet take 1 tablet by mouth once daily 30 tablet 1    Blood Pressure Monitor KIT 1 kit by Does not apply route as needed (so pt may check her blood pressure when needed) 1 kit 0    insulin glargine (LANTUS) 100 UNIT/ML injection vial Inject 30 Units into the skin 2 times daily 1 vial 3    insulin aspart (NOVOLOG) 100 UNIT/ML injection vial Inject 25 Units into the skin 3 times daily (before meals) Indications: Inject 25 units plus HPWO sliding scale three times a day      OLANZapine (ZYPREXA) 5 MG tablet Take 10 mg by mouth nightly       atorvastatin (LIPITOR) 40 MG tablet Take 40 mg by mouth nightly       aspirin 81 MG tablet Take 81 mg by mouth daily.       gabapentin (NEURONTIN) 800 MG tablet Take 800 mg by mouth 3 times daily.  glimepiride (AMARYL) 4 MG tablet Take 4 mg by mouth 2 times daily (with meals).  metFORMIN (GLUCOPHAGE) 1000 MG tablet Take 2,000 mg by mouth 2 times daily (with meals)          Allergies:    Codeine    Social History:  reports that she has quit smoking. Her smoking use included cigarettes. She has a 15.00 pack-year smoking history. She has never used smokeless tobacco. She reports that she does not drink alcohol and does not use drugs. Family History: family history includes Depression in her mother; Diabetes in her maternal aunt; Heart Disease in her mother; High Blood Pressure in her maternal aunt and mother; High Cholesterol in her maternal aunt and mother; Stroke in her mother. Diet:  ADULT DIET; Regular    Review of systems:   Pertinent positives as noted in the HPI. All other systems reviewed and negative. PHYSICAL EXAM:   height is 5' 1\" (1.549 m) and weight is 270 lb (122.5 kg). Her oral temperature is 99 °F (37.2 °C). Her blood pressure is 159/82 (abnormal) and her pulse is 110. Her respiration is 20 and oxygen saturation is 96%. Body mass index is 51.02 kg/m². Constitutional: In no acute distress. Patient is oriented to person, place, and time. Morbid obesity   Head: Normocephalic and atraumatic. Mouth/Throat: Oropharynx is clear and moist.  No oral thrush. Eyes: Conjunctivae are normal. Pupils are equal, round, and reactive to light. No scleral icterus. Neck: Neck supple. No JVD present. No tracheal deviation present. Cardiovascular: Normal rate, regular rhythm, normal heart sounds. No murmur heard. Pulmonary/Chest: Effort normal and breath sounds normal. No stridor. No respiratory distress. No rales. Patient exhibits no tenderness. Mild wheezes noted   Abdominal: Soft. Patient exhibits no distension. No tenderness. Bowel sounds present.  Protuberant   Musculoskeletal: Normal range of motion. Extremities: Patient exhibits no edema and no tenderness. Lymphadenopathy: No cervical adenopathy. Neurological: Patient is alert and oriented to person, place, and time. Skin: Skin is warm and dry. Patient is not diaphoretic. No rashes or lesions. Psychiatric: Patient has a normal mood and affect.  Patient behavior is normal.       Electronically signed by Shawn Stern M.D. on 2/27/2022 at 2:56 PM

## 2022-02-27 NOTE — ED NOTES
ED to inpatient nurses report    Chief Complaint   Patient presents with    Shortness of Breath      Present to ED from home  LOC: alert and orientated to name, place, date  Vital signs   Vitals:    02/27/22 1050 02/27/22 1100 02/27/22 1103 02/27/22 1223   BP:   130/82 (!) 159/82   Pulse:   123 119   Resp:   18 18   Temp:       TempSrc:       SpO2: 92% 92% 95% 95%   Weight:       Height:          Oxygen Baseline 4L NC    Current needs required Heated high flow   LDAs:   Peripheral IV 02/27/22 Left Antecubital (Active)   Site Assessment Clean;Dry; Intact 02/27/22 1223   Line Status Infusing 02/27/22 1223   Dressing Status Intact; Clean;Dry 02/27/22 1223   Dressing Intervention New 02/27/22 1058     Mobility: Requires assistance * 1  Pending ED orders: none  Present condition: stable    Electronically signed by Mona Figueroa RN on 2/27/2022 at 12:33 PM     Mona Figueroa RN  02/27/22 1233

## 2022-02-27 NOTE — ED NOTES
Pt transported to [facility] on cart in stable condition. Floor contacted before transport. Spoke with Yadi Fisher.      Yumi Solomon  02/27/22 0835

## 2022-02-27 NOTE — ED PROVIDER NOTES
[facility] ENCOUNTER        Pt Name: Rula Rascon  MRN: [id number]  [place] 1980  Date of evaluation: 2/27/2022  Treating Resident Physician: Tha Curtis DO  Supervising Physician: [address]       Chief Complaint   Patient presents with    Shortness of Breath     History obtained from the patient. HISTORY OF PRESENT ILLNESS    HPI  Rula Rascon is a 37 y.o. female who presents to the emergency department for evaluation of shortness of breath and hypoxia. Patient was recently discharged yesterday from the hospital. Throughout the course of the day has had worsening shortness of breath. At one point she was found to be 58% on 6 L nasal cannula which is two more than she normally requires at home. Her recent hospitalization was secondary to a bilateral pneumonia. She finished an inpatient course of Rocephin and azithromycin. Significant history of COPD. Worse with ambulation. No identified alleviating factors. Patient denies any headache, chest pain, nausea, vomiting, diarrhea. Patient does feel that her legs are a bit more swollen than normal.    The patient has no other acute complaints at this time. REVIEW OF SYSTEMS   Review of Systems   Constitutional: Negative for chills and fever. HENT: Negative for sinus pressure, sinus pain and sore throat. Respiratory: Positive for cough, shortness of breath and wheezing. Negative for chest tightness. Cardiovascular: Positive for leg swelling. Negative for chest pain and palpitations. Gastrointestinal: Negative for abdominal pain, constipation, diarrhea, nausea and vomiting. Endocrine: Negative for cold intolerance and heat intolerance. Genitourinary: Negative for difficulty urinating and dysuria. Musculoskeletal: Negative for back pain, neck pain and neck stiffness. Skin: Negative for color change and rash.    Neurological: Negative for dizziness, syncope, weakness, light-headedness, numbness and headaches. PAST MEDICAL AND SURGICAL HISTORY     Past Medical History:   Diagnosis Date    Anxiety     Depression     Diabetes mellitus ([facility] [address]     Hyperlipidemia     Hypertension      Past Surgical History:   Procedure Laterality Date    ABSCESS DRAINAGE           MEDICATIONS     Current Facility-Administered Medications:     cefepime (MAXIPIME) 2000 mg IVPB minibag, 2,000 mg, IntraVENous, Once, Johnita Bernheim, DO, Last Rate: 100 mL/hr at 02/27/22 1222, 2,000 mg at 02/27/22 1222    0.9 % sodium chloride infusion, , IntraVENous, Continuous, Johnita Bernheim, DO, Last Rate: 100 mL/hr at 02/27/22 1222, New Bag at 02/27/22 1222    Current Outpatient Medications:     budesonide (PULMICORT) 0.5 MG/2ML nebulizer suspension, Take 2 mLs by nebulization 2 times daily, Disp: 60 each, Rfl: 3    tiotropium-olodaterol (STIOLTO) 2.5-2.5 MCG/ACT AERS, Inhale 2 puffs into the lungs daily, Disp: 1 each, Rfl: 2    hydrALAZINE (APRESOLINE) 50 MG tablet, Take 1 tablet by mouth every 8 hours, Disp: 90 tablet, Rfl: 3    predniSONE (DELTASONE) 10 MG tablet, Take 4 tablets by mouth daily for 3 days, THEN 3 tablets daily for 3 days, THEN 2 tablets daily for 3 days, THEN 1 tablet daily for 3 days. , Disp: 30 tablet, Rfl: 0    nicotine (NICODERM CQ) 14 MG/24HR, Place 1 patch onto the skin daily, Disp: 30 patch, Rfl: 3    albuterol (PROVENTIL) (2.5 MG/3ML) 0.083% nebulizer solution, Take 3 mLs by nebulization every 4 hours as needed for Wheezing, Disp: 120 each, Rfl: 3    albuterol (PROVENTIL) (2.5 MG/3ML) 0.083% nebulizer solution, Take 3 mLs by nebulization 2 times daily, Disp: 120 each, Rfl: 3    pregabalin (LYRICA) 100 MG capsule, Take 200 mg by mouth 2 times daily. , Disp: , Rfl:     ibuprofen (ADVIL;MOTRIN) 600 MG tablet, Take 1 tablet by mouth 3 times daily as needed for Pain, Disp: 30 tablet, Rfl: 0    lidocaine (LIDODERM) 5 %, Place 1 patch onto the skin daily 12 hours on, 12 hours off., Disp: 30 patch, Rfl: 0    ondansetron (ZOFRAN ODT) 4 MG disintegrating tablet, Take 1 tablet by mouth every 8 hours as needed for Nausea or Vomiting, Disp: 6 tablet, Rfl: 0    Lactobacillus (PROBIOTIC ACIDOPHILUS) CAPS, Take 1 capsule by mouth daily, Disp: 30 capsule, Rfl: 0    metoprolol (LOPRESSOR) 100 MG tablet, Take 1 tablet by mouth 2 times daily Hold sbp less 110 HR less than 55, Disp: 180 tablet, Rfl: 3    diltiazem (CARDIZEM) 30 MG tablet, Take 1 tablet by mouth 3 times daily, Disp: 270 tablet, Rfl: 3    ivabradine (CORLANOR) 5 MG TABS tablet, Take 1 tablet by mouth 2 times daily (with meals), Disp: 180 tablet, Rfl: 3    diclofenac sodium 1 % GEL, Apply 2 g topically 2 times daily for 10 days, Disp: 1 Tube, Rfl: 3    amitriptyline (ELAVIL) 10 MG tablet, Take 1 tablet by mouth nightly, Disp: 30 tablet, Rfl: 4    lisinopril (PRINIVIL;ZESTRIL) 10 MG tablet, take 1 tablet by mouth once daily, Disp: 30 tablet, Rfl: 1    Blood Pressure Monitor KIT, 1 kit by Does not apply route as needed (so pt may check her blood pressure when needed), Disp: 1 kit, Rfl: 0    insulin glargine (LANTUS) 100 UNIT/ML injection vial, Inject 30 Units into the skin 2 times daily, Disp: 1 vial, Rfl: 3    insulin aspart (NOVOLOG) 100 UNIT/ML injection vial, Inject 25 Units into the skin 3 times daily (before meals) Indications: Inject 25 units plus HPWO sliding scale three times a day, Disp: , Rfl:     OLANZapine (ZYPREXA) 5 MG tablet, Take 10 mg by mouth nightly , Disp: , Rfl:     atorvastatin (LIPITOR) 40 MG tablet, Take 40 mg by mouth nightly , Disp: , Rfl:     aspirin 81 MG tablet, Take 81 mg by mouth daily. , Disp: , Rfl:     gabapentin (NEURONTIN) 800 MG tablet, Take 800 mg by mouth 3 times daily. , Disp: , Rfl:     glimepiride (AMARYL) 4 MG tablet, Take 4 mg by mouth 2 times daily (with meals). , Disp: , Rfl:     metFORMIN (GLUCOPHAGE) 1000 MG tablet, Take 2,000 mg by mouth 2 times daily (with meals) , Disp: , Rfl:       SOCIAL HISTORY     Social History     Social History Narrative    Not on file     Social History     Tobacco Use    Smoking status: Former Smoker     Packs/day: 1.00     Years: 15.00     Pack years: 15.00     Types: Cigarettes    Smokeless tobacco: Never Used   Substance Use Topics    Alcohol use: No    Drug use: No         ALLERGIES     Allergies   Allergen Reactions    Codeine Hives         FAMILY HISTORY     Family History   Problem Relation Age of Onset    High Blood Pressure Mother     Heart Disease Mother     High Cholesterol Mother     Depression Mother     Stroke Mother     Diabetes Maternal Aunt     High Blood Pressure Maternal Aunt     High Cholesterol Maternal Aunt          PREVIOUS RECORDS   Previous records reviewed: Patient last seen in 23 for contusion of back. .        PHYSICAL EXAM     ED Triage Vitals [02/27/22 1041]   BP Temp Temp Source Pulse Resp SpO2 Height Weight   (!) 146/83 99 °F (37.2 °C) Oral 131 26 (!) 78 % 5' 1\" (1.549 m) 270 lb (122.5 kg)     Initial vital signs and nursing assessment reviewed and Normal except for tachycardia likely secondary to patient's beta agonist treatment in route to the hospital.. Body mass index is 51.02 kg/m². Pulsoximetry is abnormal per my interpretation. Additional Vital Signs:  Vitals:    02/27/22 1223   BP: (!) 159/82   Pulse: 119   Resp: 18   Temp:    SpO2: 95%       Physical Exam  Constitutional:       General: She is in acute distress. Appearance: She is obese. She is not toxic-appearing or diaphoretic. HENT:      Head: Normocephalic and atraumatic. Mouth/Throat:      Mouth: Mucous membranes are moist.      Pharynx: Oropharynx is clear. No oropharyngeal exudate or posterior oropharyngeal erythema. Eyes:      General: No scleral icterus. Right eye: No discharge. Left eye: No discharge. Extraocular Movements: Extraocular movements intact.       Conjunctiva/sclera: Conjunctivae normal. Cardiovascular:      Rate and Rhythm: Normal rate and regular rhythm. Pulses: Normal pulses. Heart sounds: No murmur heard. No friction rub. No gallop. Pulmonary:      Effort: Pulmonary effort is normal.      Breath sounds: Wheezing, rhonchi and rales present. Abdominal:      Palpations: Abdomen is soft. Tenderness: There is no abdominal tenderness. There is no guarding or rebound. Musculoskeletal:      Cervical back: Normal range of motion. No rigidity. Right lower leg: No tenderness. Edema present. Left lower leg: No tenderness. Edema present. Skin:     General: Skin is warm and dry. Capillary Refill: Capillary refill takes less than 2 seconds. Neurological:      General: No focal deficit present. Mental Status: She is alert and oriented to person, place, and time. MEDICAL DECISION MAKING   Initial Assessment:   1. Chronically ill 43-year-old female resting up upright on the cot in mild to moderate respiratory distress recently discharged from hospital after bilateral pneumonia. Hypoxic at 58% with increased oxygen requirement at home. Patient immediately switched to high flow nasal cannula upon arrival. Patient with multifocal lung consolidation as well as biapical and expiratory wheezes. Significant history of COPD. Concern for worsening/recurrent pneumonia, CHF, COPD exacerbation, unlikely PE, ACS, dissection, pericarditis, myocarditis, valvulopathy.   Plan:    Labs   Respiratory support   Beta agonist   Steroids   Antibiotics   Chest x-ray   ABG   Pregnancy test   Admission        ED RESULTS   Laboratory results:  Labs Reviewed   CBC WITH AUTO DIFFERENTIAL - Abnormal; Notable for the following components:       Result Value    WBC 24.2 (*)     Hemoglobin 16.1 (*)     Hematocrit 54.2 (*)     .9 (*)     MCHC 29.7 (*)     RDW-SD 52.0 (*)     Platelets 384 (*)     MPV 9.3 (*)     Segs Absolute 20.5 (*)     Immature Grans (Abs) 0.22 (*) All other components within normal limits   BASIC METABOLIC PANEL W/ REFLEX TO MG FOR LOW K - Abnormal; Notable for the following components:    Chloride 92 (*)     CO2 40 (*)     Glucose 187 (*)     All other components within normal limits   BLOOD GAS, ARTERIAL - Abnormal; Notable for the following components:    PCO2 76 (*)     PO2 58 (*)     HCO3 42 (*)     Base Excess (Calculated) 11.5 (*)     All other components within normal limits   ANION GAP - Abnormal; Notable for the following components:    Anion Gap 7.0 (*)     All other components within normal limits   COVID-19, RAPID   RAPID INFLUENZA A/B ANTIGENS   PNEUMONIA PANEL, MOLECULAR   TROPONIN   BRAIN NATRIURETIC PEPTIDE   HCG, SERUM, QUALITATIVE   PROCALCITONIN   OSMOLALITY   GLOMERULAR FILTRATION RATE, ESTIMATED   SCAN OF BLOOD SMEAR       Radiologic studies results:  XR CHEST PORTABLE   Final Result   Worsening bilateral patchy pulmonary opacities. There is a probable small right-sided pleural effusion. This could represent pulmonary edema versus infection. **This report has been created using voice recognition software. It may contain minor errors which are inherent in voice recognition technology. **      Final report electronically signed by Dr. Powell Ganser on 2/27/2022 11:33 AM          ED Medications administered this visit:   Medications   cefepime (MAXIPIME) 2000 mg IVPB minibag (2,000 mg IntraVENous New Bag 2/27/22 1222)   0.9 % sodium chloride infusion ( IntraVENous New Bag 2/27/22 1222)   dexamethasone (DECADRON) injection 6 mg (6 mg IntraVENous Given 2/27/22 1100)   ipratropium-albuterol (DUONEB) nebulizer solution 2 ampule (2 ampules Inhalation Given 2/27/22 1058)         ED COURSE            MEDICATION CHANGES     Current Discharge Medication List            FINAL DISPOSITION     Final diagnoses:   Pneumonia of both lungs due to infectious organism, unspecified part of lung   Hypoxia     Condition: condition: stable  Dispo:

## 2022-02-27 NOTE — ED TRIAGE NOTES
Pt to ED by EMS with c/c SOB. Pt states she was discharged yesterday from hospital diagnosed with pneumonia. Pt was sent home on 4L NC at all times. Pt son called 911 d/t pt not breathing adequately. Pt oxygen 78% on 6L NC. Pt tachycardic. EKG completed on arrival. Telemetry in place. Respiratory at bedside.  Verbal order for Heated high flow by Dr. Darvin Ramírez

## 2022-02-27 NOTE — RT PROTOCOL NOTE
RT Inhaler-Nebulizer Bronchodilator Protocol Note    There is a bronchodilator order in the chart from a provider indicating to follow the RT Bronchodilator Protocol and there is an Initiate RT Inhaler-Nebulizer Bronchodilator Protocol order as well (see protocol at bottom of note). CXR Findings:  XR CHEST PORTABLE    Result Date: 2/27/2022  Worsening bilateral patchy pulmonary opacities. There is a probable small right-sided pleural effusion. This could represent pulmonary edema versus infection. **This report has been created using voice recognition software. It may contain minor errors which are inherent in voice recognition technology. ** Final report electronically signed by Dr. Trudy Head on 2/27/2022 11:33 AM      The findings from the last RT Protocol Assessment were as follows:   History Pulmonary Disease: Chronic pulmonary disease  Respiratory Pattern: Dyspnea on exertion or RR 21-25 bpm  Breath Sounds: Inspiratory and expiratory or bilateral wheezing and/or rhonchi  Cough: Strong, spontaneous, non-productive  Indication for Bronchodilator Therapy: Decreased or absent breath sounds,On home bronchodilators  Bronchodilator Assessment Score: 10    Aerosolized bronchodilator medication orders have been revised according to the RT Inhaler-Nebulizer Bronchodilator Protocol below. Respiratory Therapist to perform RT Therapy Protocol Assessment initially then follow the protocol. Repeat RT Therapy Protocol Assessment PRN for score 0-3 or on second treatment, BID, and PRN for scores above 3. No Indications - adjust the frequency to every 6 hours PRN wheezing or bronchospasm, if no treatments needed after 48 hours then discontinue using Per Protocol order mode. If indication present, adjust the RT bronchodilator orders based on the Bronchodilator Assessment Score as indicated below.   Use Inhaler orders unless patient has one or more of the following: on home nebulizer, not able to hold breath for

## 2022-02-28 LAB
ALLEN TEST: POSITIVE
ANION GAP SERPL CALCULATED.3IONS-SCNC: 6 MEQ/L (ref 8–16)
BASE EXCESS (CALCULATED): 9.1 MMOL/L (ref -2.5–2.5)
BASOPHILS # BLD: 0.4 %
BASOPHILS ABSOLUTE: 0.1 THOU/MM3 (ref 0–0.1)
BUN BLDV-MCNC: 17 MG/DL (ref 7–22)
CALCIUM SERPL-MCNC: 8.9 MG/DL (ref 8.5–10.5)
CHLORIDE BLD-SCNC: 92 MEQ/L (ref 98–111)
CO2: 38 MEQ/L (ref 23–33)
COLLECTED BY:: ABNORMAL
CREAT SERPL-MCNC: 0.5 MG/DL (ref 0.4–1.2)
DEVICE: ABNORMAL
EOSINOPHIL # BLD: 1.3 %
EOSINOPHILS ABSOLUTE: 0.3 THOU/MM3 (ref 0–0.4)
ERYTHROCYTE [DISTWIDTH] IN BLOOD BY AUTOMATED COUNT: 13.8 % (ref 11.5–14.5)
ERYTHROCYTE [DISTWIDTH] IN BLOOD BY AUTOMATED COUNT: 51.1 FL (ref 35–45)
GFR SERPL CREATININE-BSD FRML MDRD: > 90 ML/MIN/1.73M2
GLUCOSE BLD-MCNC: 167 MG/DL (ref 70–108)
GLUCOSE BLD-MCNC: 176 MG/DL (ref 70–108)
GLUCOSE BLD-MCNC: 212 MG/DL (ref 70–108)
GLUCOSE BLD-MCNC: 222 MG/DL (ref 70–108)
GLUCOSE BLD-MCNC: 300 MG/DL (ref 70–108)
HCO3: 38 MMOL/L (ref 23–28)
HCT VFR BLD CALC: 51.8 % (ref 37–47)
HEMOGLOBIN: 15.5 GM/DL (ref 12–16)
IFIO2: 60
IMMATURE GRANS (ABS): 0.17 THOU/MM3 (ref 0–0.07)
IMMATURE GRANULOCYTES: 0.8 %
LACTIC ACID: 1.2 MMOL/L (ref 0.5–2)
LYMPHOCYTES # BLD: 16.6 %
LYMPHOCYTES ABSOLUTE: 3.7 THOU/MM3 (ref 1–4.8)
MCH RBC QN AUTO: 30.2 PG (ref 26–33)
MCHC RBC AUTO-ENTMCNC: 29.9 GM/DL (ref 32.2–35.5)
MCV RBC AUTO: 101 FL (ref 81–99)
MONOCYTES # BLD: 7.2 %
MONOCYTES ABSOLUTE: 1.6 THOU/MM3 (ref 0.4–1.3)
NUCLEATED RED BLOOD CELLS: 0 /100 WBC
O2 SATURATION: 91 %
PCO2: 65 MMHG (ref 35–45)
PH BLOOD GAS: 7.37 (ref 7.35–7.45)
PLATELET # BLD: 390 THOU/MM3 (ref 130–400)
PMV BLD AUTO: 9.8 FL (ref 9.4–12.4)
PO2: 66 MMHG (ref 71–104)
POTASSIUM REFLEX MAGNESIUM: 5 MEQ/L (ref 3.5–5.2)
RBC # BLD: 5.13 MILL/MM3 (ref 4.2–5.4)
SEG NEUTROPHILS: 73.7 %
SEGMENTED NEUTROPHILS ABSOLUTE COUNT: 16.2 THOU/MM3 (ref 1.8–7.7)
SODIUM BLD-SCNC: 136 MEQ/L (ref 135–145)
SOURCE, BLOOD GAS: ABNORMAL
WBC # BLD: 22 THOU/MM3 (ref 4.8–10.8)

## 2022-02-28 PROCEDURE — 94660 CPAP INITIATION&MGMT: CPT

## 2022-02-28 PROCEDURE — 82803 BLOOD GASES ANY COMBINATION: CPT

## 2022-02-28 PROCEDURE — 6370000000 HC RX 637 (ALT 250 FOR IP): Performed by: INTERNAL MEDICINE

## 2022-02-28 PROCEDURE — 6370000000 HC RX 637 (ALT 250 FOR IP): Performed by: STUDENT IN AN ORGANIZED HEALTH CARE EDUCATION/TRAINING PROGRAM

## 2022-02-28 PROCEDURE — 76937 US GUIDE VASCULAR ACCESS: CPT

## 2022-02-28 PROCEDURE — 94760 N-INVAS EAR/PLS OXIMETRY 1: CPT

## 2022-02-28 PROCEDURE — 82948 REAGENT STRIP/BLOOD GLUCOSE: CPT

## 2022-02-28 PROCEDURE — 80048 BASIC METABOLIC PNL TOTAL CA: CPT

## 2022-02-28 PROCEDURE — 6360000002 HC RX W HCPCS: Performed by: STUDENT IN AN ORGANIZED HEALTH CARE EDUCATION/TRAINING PROGRAM

## 2022-02-28 PROCEDURE — 2580000003 HC RX 258: Performed by: STUDENT IN AN ORGANIZED HEALTH CARE EDUCATION/TRAINING PROGRAM

## 2022-02-28 PROCEDURE — 99232 SBSQ HOSP IP/OBS MODERATE 35: CPT | Performed by: INTERNAL MEDICINE

## 2022-02-28 PROCEDURE — 94669 MECHANICAL CHEST WALL OSCILL: CPT

## 2022-02-28 PROCEDURE — 83605 ASSAY OF LACTIC ACID: CPT

## 2022-02-28 PROCEDURE — 99254 IP/OBS CNSLTJ NEW/EST MOD 60: CPT | Performed by: INTERNAL MEDICINE

## 2022-02-28 PROCEDURE — C1751 CATH, INF, PER/CENT/MIDLINE: HCPCS

## 2022-02-28 PROCEDURE — 94640 AIRWAY INHALATION TREATMENT: CPT

## 2022-02-28 PROCEDURE — 2700000000 HC OXYGEN THERAPY PER DAY

## 2022-02-28 PROCEDURE — 6360000002 HC RX W HCPCS: Performed by: INTERNAL MEDICINE

## 2022-02-28 PROCEDURE — 2580000003 HC RX 258: Performed by: INTERNAL MEDICINE

## 2022-02-28 PROCEDURE — 36600 WITHDRAWAL OF ARTERIAL BLOOD: CPT

## 2022-02-28 PROCEDURE — 2500000003 HC RX 250 WO HCPCS: Performed by: INTERNAL MEDICINE

## 2022-02-28 PROCEDURE — 2060000000 HC ICU INTERMEDIATE R&B

## 2022-02-28 PROCEDURE — 36415 COLL VENOUS BLD VENIPUNCTURE: CPT

## 2022-02-28 PROCEDURE — 85025 COMPLETE CBC W/AUTO DIFF WBC: CPT

## 2022-02-28 RX ORDER — SODIUM CHLORIDE 9 MG/ML
25 INJECTION, SOLUTION INTRAVENOUS PRN
Status: DISCONTINUED | OUTPATIENT
Start: 2022-02-28 | End: 2022-03-02 | Stop reason: HOSPADM

## 2022-02-28 RX ORDER — SODIUM CHLORIDE 0.9 % (FLUSH) 0.9 %
5-40 SYRINGE (ML) INJECTION EVERY 12 HOURS SCHEDULED
Status: DISCONTINUED | OUTPATIENT
Start: 2022-02-28 | End: 2022-03-02 | Stop reason: HOSPADM

## 2022-02-28 RX ORDER — LIDOCAINE HYDROCHLORIDE 10 MG/ML
5 INJECTION, SOLUTION EPIDURAL; INFILTRATION; INTRACAUDAL; PERINEURAL ONCE
Status: COMPLETED | OUTPATIENT
Start: 2022-02-28 | End: 2022-02-28

## 2022-02-28 RX ORDER — METHYLPREDNISOLONE SODIUM SUCCINATE 40 MG/ML
40 INJECTION, POWDER, LYOPHILIZED, FOR SOLUTION INTRAMUSCULAR; INTRAVENOUS ONCE
Status: COMPLETED | OUTPATIENT
Start: 2022-02-28 | End: 2022-02-28

## 2022-02-28 RX ORDER — IPRATROPIUM BROMIDE AND ALBUTEROL SULFATE 2.5; .5 MG/3ML; MG/3ML
1 SOLUTION RESPIRATORY (INHALATION)
Status: DISCONTINUED | OUTPATIENT
Start: 2022-02-28 | End: 2022-03-02 | Stop reason: HOSPADM

## 2022-02-28 RX ORDER — SODIUM CHLORIDE 0.9 % (FLUSH) 0.9 %
5-40 SYRINGE (ML) INJECTION PRN
Status: DISCONTINUED | OUTPATIENT
Start: 2022-02-28 | End: 2022-03-02 | Stop reason: HOSPADM

## 2022-02-28 RX ORDER — GUAIFENESIN 600 MG/1
600 TABLET, EXTENDED RELEASE ORAL 2 TIMES DAILY
Status: DISCONTINUED | OUTPATIENT
Start: 2022-02-28 | End: 2022-03-02 | Stop reason: HOSPADM

## 2022-02-28 RX ADMIN — INSULIN GLARGINE 50 UNITS: 100 INJECTION, SOLUTION SUBCUTANEOUS at 21:31

## 2022-02-28 RX ADMIN — DILTIAZEM HYDROCHLORIDE 30 MG: 30 TABLET, FILM COATED ORAL at 20:47

## 2022-02-28 RX ADMIN — OLANZAPINE 10 MG: 10 TABLET, FILM COATED ORAL at 20:39

## 2022-02-28 RX ADMIN — ALBUTEROL SULFATE 2.5 MG: 2.5 SOLUTION RESPIRATORY (INHALATION) at 13:12

## 2022-02-28 RX ADMIN — GABAPENTIN 800 MG: 400 CAPSULE ORAL at 09:08

## 2022-02-28 RX ADMIN — PIPERACILLIN AND TAZOBACTAM 3375 MG: 3; .375 INJECTION, POWDER, LYOPHILIZED, FOR SOLUTION INTRAVENOUS at 20:45

## 2022-02-28 RX ADMIN — INSULIN LISPRO 2 UNITS: 100 INJECTION, SOLUTION INTRAVENOUS; SUBCUTANEOUS at 16:21

## 2022-02-28 RX ADMIN — METOPROLOL TARTRATE 100 MG: 100 TABLET, FILM COATED ORAL at 09:09

## 2022-02-28 RX ADMIN — SODIUM CHLORIDE, PRESERVATIVE FREE 10 ML: 5 INJECTION INTRAVENOUS at 09:10

## 2022-02-28 RX ADMIN — GUAIFENESIN 600 MG: 600 TABLET, EXTENDED RELEASE ORAL at 14:56

## 2022-02-28 RX ADMIN — SODIUM CHLORIDE: 9 INJECTION, SOLUTION INTRAVENOUS at 14:43

## 2022-02-28 RX ADMIN — SODIUM CHLORIDE, PRESERVATIVE FREE 10 ML: 5 INJECTION INTRAVENOUS at 20:40

## 2022-02-28 RX ADMIN — GUAIFENESIN 600 MG: 600 TABLET, EXTENDED RELEASE ORAL at 20:39

## 2022-02-28 RX ADMIN — AMITRIPTYLINE HYDROCHLORIDE 10 MG: 10 TABLET, FILM COATED ORAL at 20:39

## 2022-02-28 RX ADMIN — IPRATROPIUM BROMIDE AND ALBUTEROL SULFATE 1 AMPULE: .5; 3 SOLUTION RESPIRATORY (INHALATION) at 16:43

## 2022-02-28 RX ADMIN — HYDRALAZINE HYDROCHLORIDE 50 MG: 50 TABLET, FILM COATED ORAL at 14:52

## 2022-02-28 RX ADMIN — PREDNISONE 20 MG: 20 TABLET ORAL at 09:08

## 2022-02-28 RX ADMIN — IPRATROPIUM BROMIDE AND ALBUTEROL SULFATE 1 AMPULE: .5; 3 SOLUTION RESPIRATORY (INHALATION) at 20:09

## 2022-02-28 RX ADMIN — METHYLPREDNISOLONE SODIUM SUCCINATE 40 MG: 40 INJECTION, POWDER, FOR SOLUTION INTRAMUSCULAR; INTRAVENOUS at 14:57

## 2022-02-28 RX ADMIN — INSULIN LISPRO 1 UNITS: 100 INJECTION, SOLUTION INTRAVENOUS; SUBCUTANEOUS at 08:00

## 2022-02-28 RX ADMIN — LISINOPRIL 10 MG: 10 TABLET ORAL at 09:09

## 2022-02-28 RX ADMIN — BUDESONIDE 500 MCG: 0.5 INHALANT RESPIRATORY (INHALATION) at 08:07

## 2022-02-28 RX ADMIN — METOPROLOL TARTRATE 100 MG: 100 TABLET, FILM COATED ORAL at 20:40

## 2022-02-28 RX ADMIN — ASPIRIN 81 MG: 81 TABLET, COATED ORAL at 09:11

## 2022-02-28 RX ADMIN — LIDOCAINE HYDROCHLORIDE 5 ML: 10 INJECTION, SOLUTION EPIDURAL; INFILTRATION; INTRACAUDAL; PERINEURAL at 11:35

## 2022-02-28 RX ADMIN — PIPERACILLIN AND TAZOBACTAM 3375 MG: 3; .375 INJECTION, POWDER, LYOPHILIZED, FOR SOLUTION INTRAVENOUS at 11:43

## 2022-02-28 RX ADMIN — HYDRALAZINE HYDROCHLORIDE 50 MG: 50 TABLET, FILM COATED ORAL at 06:06

## 2022-02-28 RX ADMIN — DILTIAZEM HYDROCHLORIDE 30 MG: 30 TABLET, FILM COATED ORAL at 09:09

## 2022-02-28 RX ADMIN — GABAPENTIN 800 MG: 400 CAPSULE ORAL at 20:39

## 2022-02-28 RX ADMIN — GABAPENTIN 800 MG: 400 CAPSULE ORAL at 14:52

## 2022-02-28 RX ADMIN — DILTIAZEM HYDROCHLORIDE 30 MG: 30 TABLET, FILM COATED ORAL at 14:52

## 2022-02-28 RX ADMIN — ATORVASTATIN CALCIUM 40 MG: 40 TABLET, FILM COATED ORAL at 20:40

## 2022-02-28 RX ADMIN — PIPERACILLIN AND TAZOBACTAM 3375 MG: 3; .375 INJECTION, POWDER, LYOPHILIZED, FOR SOLUTION INTRAVENOUS at 03:10

## 2022-02-28 RX ADMIN — METFORMIN HYDROCHLORIDE 2000 MG: 500 TABLET ORAL at 09:13

## 2022-02-28 RX ADMIN — ENOXAPARIN SODIUM 40 MG: 100 INJECTION SUBCUTANEOUS at 09:11

## 2022-02-28 RX ADMIN — TIOTROPIUM BROMIDE AND OLODATEROL 2 PUFF: 3.124; 2.736 SPRAY, METERED RESPIRATORY (INHALATION) at 08:09

## 2022-02-28 RX ADMIN — ALBUTEROL SULFATE 2.5 MG: 2.5 SOLUTION RESPIRATORY (INHALATION) at 08:07

## 2022-02-28 RX ADMIN — INSULIN LISPRO 2 UNITS: 100 INJECTION, SOLUTION INTRAVENOUS; SUBCUTANEOUS at 11:36

## 2022-02-28 RX ADMIN — HYDRALAZINE HYDROCHLORIDE 50 MG: 50 TABLET, FILM COATED ORAL at 20:40

## 2022-02-28 RX ADMIN — METFORMIN HYDROCHLORIDE 2000 MG: 500 TABLET ORAL at 16:19

## 2022-02-28 ASSESSMENT — PAIN SCALES - GENERAL
PAINLEVEL_OUTOF10: 0

## 2022-02-28 NOTE — CONSULTS
[facility]    Patient - Genaro Clements   MRN -  [id number]   [facility]   - 1980      Date of Admission -  2022 10:41 AM  Date of evaluation -  2022  Room - 4A-10/[facility]-[address] Primary Care Physician - ROBBIE Qureshi CNP     Problem List      Active Hospital Problems    Diagnosis Date Noted    Acute respiratory failure with hypoxia [facility]) [J96.01] 2022     Reason for Consult    For management of respiratory distress. She is on high flow  HPI   History Obtained From: Patient and electronic medical record. Genaro Clements is a 37 y.o. female  was initially admitted under hospitalist service. Pulmonary medicine was consulted for further management of respiratory distress and high flow    She  is a 41 y.o. female with a past medical history of chronic tobacco smoking for 22 years with 1 pack of cigarettes per day. Quit smoking 2 weeks back was seen by Dr. Martinez Huerta MD during her previous hospitalization. She had a PMH of hypertension, diabetes mellitus type 2 and ?COPD. She was recently admitted and discharged from [address] [place] [address]. During her previous hospitalization she was treated for a COPD exacerbation along with hypercapnic respiratory failure. She was scheduled for outpatient split-night sleep study to evaluate for sleep apnea and treat with the PAP device. She was discharged from [facility] on 2022. She came back to emergency room at [facility] on 2022 with worsening of shortness of breath and hypoxia. On further questioning patient told me that after going home she started noticing worsening of cough with yellow sputum production. She also noted worsening of shortness of breath. During her previous hospitalization she was treated with Rocephin and Zithromax along with a tapering dose of steroids. She was discharged home with 4 L of oxygen on nasal cannula. Patient's son called 911 due to development of tachypnea and hypoxia. Patient was placed on high flow oxygen. She was also placed on Decadron 6 mg IV daily. Pulmonary medicine was called for further evaluation.     She is currently working at [employer] in [facility]. She denied any significant exposure to aerosolized particles or hazardous fumes. 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 She initially presented to [place], [place] with worsening of shortness of breath. Onset: gradual   Duration: 1 day after discharge from the hospital  Diurnal variation: None  Current functional capacity on level ground: Very limited on level ground. She admits to orthopnea. She admits to paroxysmal nocturnal dyspnea. She is having cough: Yes  Duration of cough: for few days. Her cough is associated with sputum production: Yes    The sputum color: yellow  Hemoptysis: No.  Diurnal variation: No    She is having chest pain: No    She gives a history of fever: No  Chills & rigors: No  Associated night sweats: No.    Sleep medicine HPI:    Sleep apnea symptoms:  Noticed to have loud snoring:Yes. Witnessed apneas during sleep noticed: Yes. History of choking and gasping sensation at night time: Yes. History of headaches in the morning:Yes. History of dry mouth in the morning: Yes. History of palpitations during night time/nocturnal awakenings: Yes. History of sweating during night time/nocturnal awakenings:  Yes     General:  History of head injury in the past: No.   Associated loss of consciousness with head injury: No.  History of seizures: No.   Rest less legs syndrome symptoms:NO  History suggestive of periodic limb movements during sleep: NO  History suggestive of hypnagogic hallucinations: NO  History suggestive of hypnopompic hallucinations: NO  History suggestive of sleep talking:NO  History suggestive of sleep walking:NO  History suggestive of bruxism: No.  History suggestive of cataplexy: NO  History suggestive of sleep paralysis: NO     History regarding old sleep studies:  Prior history of sleep study: No.  Using CPAP device: No.  Currently using home Oxygen: NO.        Patient considerations:  Is the patient is ambulatory: Yes  Patient is currently using: None of these Wheelchair, [employer] or [employer]. Para/Quadriplegic: NO  Hearing deficit : NO  Claustrophobic: NO  MDD : NO  Blind: NO  Incontinent: NO  Para/Quadraplegi: NO.   Need transportation to and from Sleep Center:NO     [place] Sleepiness Score:   Sitting and readin  Watching TV:3  Sitting inactive in a public place:3  Being a passenger in a motor vehicle for an hour or more:3  Lying down in the afternoon:3  Sitting and talking to someone:1  Sitting quietly after lunch (no alcohol):1  Stopped for a few minutes in traffic while drivin  [employer]     Mallampati Score: 4   Neck Circumference:20 Inches.     PMHx   Past Medical History      Diagnosis Date    Anxiety     Depression     Diabetes mellitus ([facility] [address]     Hyperlipidemia     Hypertension       Past Surgical History        Procedure Laterality Date    ABSCESS DRAINAGE       Meds    Current Medications    sodium chloride flush  5-40 mL IntraVENous 2 times per day    enoxaparin  40 mg SubCUTAneous Daily    amitriptyline  10 mg Oral Nightly    aspirin  81 mg Oral Daily    atorvastatin  40 mg Oral Nightly    budesonide  0.5 mg Nebulization BID    dilTIAZem  30 mg Oral TID    gabapentin  800 mg Oral TID    hydrALAZINE  50 mg Oral 3 times per day    lisinopril  10 mg Oral Daily    metoprolol  100 mg Oral BID    OLANZapine  10 mg Oral Nightly    tiotropium-olodaterol  2 puff Inhalation Daily    nicotine  1 patch TransDERmal Daily    metFORMIN  2,000 mg Oral BID     piperacillin-tazobactam  3,375 mg IntraVENous Q8H    insulin glargine  50 Units SubCUTAneous Nightly    insulin lispro  0-6 Units SubCUTAneous TID WC    insulin lispro  0-3 Units SubCUTAneous Nightly    predniSONE  20 mg Oral BID    albuterol  2.5 mg Nebulization TID     sodium chloride flush, sodium chloride, polyethylene glycol, acetaminophen **OR** acetaminophen, albuterol, ondansetron, glucose, glucagon (rDNA), dextrose, dextrose bolus (hypoglycemia) **OR** dextrose bolus (hypoglycemia)  IV Drips/Infusions   sodium chloride      sodium chloride 100 mL/hr at 02/27/22 1222    dextrose       Home Medications  Medications Prior to Admission: budesonide (PULMICORT) 0.5 MG/2ML nebulizer suspension, Take 2 mLs by nebulization 2 times daily  tiotropium-olodaterol (STIOLTO) 2.5-2.5 MCG/ACT AERS, Inhale 2 puffs into the lungs daily  hydrALAZINE (APRESOLINE) 50 MG tablet, Take 1 tablet by mouth every 8 hours  predniSONE (DELTASONE) 10 MG tablet, Take 4 tablets by mouth daily for 3 days, THEN 3 tablets daily for 3 days, THEN 2 tablets daily for 3 days, THEN 1 tablet daily for 3 days. nicotine (NICODERM CQ) 14 MG/24HR, Place 1 patch onto the skin daily  albuterol (PROVENTIL) (2.5 MG/3ML) 0.083% nebulizer solution, Take 3 mLs by nebulization every 4 hours as needed for Wheezing  albuterol (PROVENTIL) (2.5 MG/3ML) 0.083% nebulizer solution, Take 3 mLs by nebulization 2 times daily  pregabalin (LYRICA) 100 MG capsule, Take 200 mg by mouth 2 times daily.   ibuprofen (ADVIL;MOTRIN) 600 MG tablet, Take 1 tablet by mouth 3 times daily as needed for Pain  lidocaine (LIDODERM) 5 %, Place 1 patch onto the skin daily 12 hours on, 12 hours off.  ondansetron (ZOFRAN ODT) 4 MG disintegrating tablet, Take 1 tablet by mouth every 8 hours as needed for Nausea or Vomiting  Lactobacillus (PROBIOTIC ACIDOPHILUS) CAPS, Take 1 capsule by mouth daily  metoprolol (LOPRESSOR) 100 MG tablet, Take 1 tablet by mouth 2 times daily Hold sbp less 110 HR less than 55  diltiazem (CARDIZEM) 30 MG tablet, Take 1 tablet by mouth 3 times daily  ivabradine (CORLANOR) 5 MG TABS tablet, Take 1 tablet by mouth 2 times daily (with meals)  diclofenac sodium 1 % GEL, Apply 2 g topically 2 times daily for 10 days  amitriptyline (ELAVIL) 10 MG tablet, Take 1 tablet by mouth nightly  lisinopril (PRINIVIL;ZESTRIL) 10 MG tablet, take 1 tablet by mouth once daily  Blood Pressure Monitor KIT, 1 kit by Does not apply route as needed (so pt may check her blood pressure when needed)  insulin glargine (LANTUS) 100 UNIT/ML injection vial, Inject 30 Units into the skin 2 times daily  insulin aspart (NOVOLOG) 100 UNIT/ML injection vial, Inject 25 Units into the skin 3 times daily (before meals) Indications: Inject 25 units plus HPWO sliding scale three times a day  OLANZapine (ZYPREXA) 5 MG tablet, Take 10 mg by mouth nightly   atorvastatin (LIPITOR) 40 MG tablet, Take 40 mg by mouth nightly   aspirin 81 MG tablet, Take 81 mg by mouth daily. gabapentin (NEURONTIN) 800 MG tablet, Take 800 mg by mouth 3 times daily. glimepiride (AMARYL) 4 MG tablet, Take 4 mg by mouth 2 times daily (with meals). metFORMIN (GLUCOPHAGE) 1000 MG tablet, Take 2,000 mg by mouth 2 times daily (with meals)   Diet    ADULT DIET; Regular  Allergies    Codeine  Family History          Problem Relation Age of Onset    High Blood Pressure Mother     Heart Disease Mother     High Cholesterol Mother     Depression Mother     Stroke Mother     Diabetes Maternal Aunt     High Blood Pressure Maternal Aunt     High Cholesterol Maternal Aunt      Sleep History    Never diagnosed with sleep apnea in the past.  She is waiting for a split-night sleep study    Social History     Social History     Tobacco Use    Smoking status: Former Smoker     Packs/day: 1.00     Years: 15.00     Pack years: 15.00     Types: Cigarettes    Smokeless tobacco: Never Used   Substance Use Topics    Alcohol use: No    Drug use:  No [place] of systems   General/Constitutional: No recent loss of weight or appetite changes. No fever or chills. HENT: Negative. Eyes: Negative. Lower respiratory tract/ lungs: Worsening of shortness of breath. She remain on high flow. No hemoptysis. Cardiovascular: No chest pain. Gastrointestinal: No nausea or vomiting. Neurological: No focal neurologiacal weakness. Extremities: No edema. Musculoskeletal: No complaints. Genitourinary: No complaints. Hematological: Negative. Psychiatric/Behavioral: Negative. Skin: No itching. Vitals     height is 5' 1\" (1.549 m) and weight is 270 lb (122.5 kg). Her oral temperature is 98.5 °F (36.9 °C). Her blood pressure is 129/65 and her pulse is 92. Her respiration is 18 and oxygen saturation is 96%. Body mass index is 51.02 kg/m². SUPPLEMENTAL O2: O2 Flow Rate (L/min): 60 L/min       I/O    No intake or output data in the 24 hours ending 02/28/22 1326  No intake/output data recorded. Patient Vitals for the past 96 hrs (Last 3 readings):   Weight   02/27/22 1041 270 lb (122.5 kg)       Exam   General Appearance: well built, well nourished in no acute distress on high flow  HEENT: Normal, Head is normocephalic, atraumatic. Rest of the exam is limited by full face mask. PERRL  Neck - Supple, No JVD present. No tracheal deviation. Lungs - Bilateral air entry present. Bilateral breath sounds heard. Diminished breath sounds at bases right more than left side. Bilateral expiratory wheezes. No rales. Cardiovascular - Heart sounds are normal.  Regular rhythm normal rate without murmur, gallop or rub. Abdomen - Soft, obese, nontender, nondistended, no masses or organomegaly. Multiple bruises markings noted from her Lovenox injections  Neurologic - Awake, alert, oriented. There are no focal motor or sensory deficits  Extremities - No cyanosis, clubbing or edema. Musculoskeletal: Normal range of motion. Patient exhibits no tenderness.    Lymphadenopathy: No cervical adenopathy. Psychiatric: Patient  has a normal mood. Skin - No bruising or bleeding. Labs  - Old records and notes have been reviewed in [facility]   ABG  Lab Results   Component Value Date    PH 7.35 02/27/2022    PO2 58 02/27/2022    PCO2 76 02/27/2022    HCO3 42 02/27/2022    O2SAT 87 02/27/2022     Lab Results   Component Value Date    IFIO2 75 02/27/2022     CBC  Recent Labs     02/27/22  1100 02/28/22  0501   WBC 24.2* 22.0*   RBC 5.32 5.13   HGB 16.1* 15.5   HCT 54.2* 51.8*   .9* 101.0*   MCH 30.3 30.2   MCHC 29.7* 29.9*   * 390   MPV 9.3* 9.8      BMP  Recent Labs     02/27/22  1100 02/28/22  0501    136   K 5.0 5.0   CL 92* 92*   CO2 40* 38*   BUN 19 17   CREATININE 0.6 0.5   GLUCOSE 187* 176*   CALCIUM 9.0 8.9     LFT  No results for input(s): AST, ALT, ALB, BILITOT, ALKPHOS, LIPASE in the last 72 hours. Invalid input(s): AMYLASE  TROP  Lab Results   Component Value Date    TROPONINT < 0.010 02/27/2022    TROPONINT < 0.010 02/18/2022    TROPONINT < 0.010 08/08/2018     BNP  No results for input(s): BNP in the last 72 hours. Lactic Acid  Recent Labs     02/28/22  0501   LACTA 1.2     INR  No results for input(s): INR, PROTIME in the last 72 hours. PTT  No results for input(s): APTT in the last 72 hours. Glucose  Recent Labs     02/27/22 2057 02/28/22  0631 02/28/22  1128   POCGLU 220* 167* 212*     UA No results for input(s): SPECGRAV, PHUR, COLORU, CLARITYU, MUCUS, PROTEINU, BLOODU, RBCUA, WBCUA, BACTERIA, NITRU, GLUCOSEU, BILIRUBINUR, UROBILINOGEN, KETUA, LABCAST, LABCASTTY, AMORPHOS in the last 72 hours. Invalid input(s): CRYSTALS.     PFTs   None in epic    Sleep studies   None in epic  Cultures    Influenza a and B performed on 27 February 2022-negative  COVID-19 test performed on 27 February 2022-none detected    EKG     Echocardiogram    Summary   Technically difficult examination.   Left ventricular size and systolic function is normal. Ejection fraction   was estimated at 55-60%. LV wall thickness is within normal limits and   there are no obvious wall motion abnormalities.   The right ventricular size appears normal with normal systolic function   and wall thickness    Radiology    CXR  Chest x-ray performed on 27 February 2022:. Sun Feb 27, 2022 11:33   Narrative PROCEDURE: XR CHEST PORTABLE   CLINICAL INFORMATION: hypoxia, sob. COMPARISON: Chest x-ray 2/24/2022. Worsening bilateral patchy pulmonary opacities. There is a probable small right-sided pleural effusion. This could represent pulmonary edema versus infection. CT Scans  (See actual reports for details)   CT angiogram of chest performed on 18 February 2022:  Impression:  Bilateral pneumonia with significant reactive adenopathy. Follow-up recommended to ensure complete clearing of via chest radiography and subsequent CT examination across the next 6-8 weeks. Assessment   -Acute on chronic hypercapnic respiratory failure due to COPD exacerbation VS acute decompensated diastolic congestive heart failure VS obesity hypoventilation  -COPD exacerbation due to acute bronchitis VS right lower lobe pneumonia. Given patient history of recent hospitalization cannot exclude hospital-acquired pneumonia at this time.  -Right lower lobe pneumonia with a high suspicion for hospital-acquired pneumonia  -Chronic history of tobacco abuse. She is smoking for the last 22 years with 1 pack of cigarettes per day. Quit smoking 2 weeks back. -Morbid obesity with a high suspicion for obesity hypoventilation given patient's serum bicarb of 38 and a PCO2 of 76 mmHg on her arterial blood gas.    -Essential hypertension under control with medications  -Diabetes mellitus type 2    Plan   -Monitor SpO2 and wean supplemental O2 to maintain SpO2 >92%  -Change prednisone to Solumedrol 40 gm IV BID  -Start patient on Duonebs Q 4 hrs WA  -Discontinue Pulmicort nebulizations.  -Discontinue albuterol HFA/nebulization  -Discontinue Stiolto  -Follow pending cultures  -Continue current antibiotics ordered by primary service.  -Continue Guaifenesin 600 mg PO BID   -Pulmonary hygiene with IS and acapella   -Home O2 eval completed 4 at rest and 6 LPM with exertion  -Start patient on BiPAP with 16 x 6 cm of water.  -Obtain arterial blood gas 3 hours after a BiPAP set up  -Aspiration precautions  -Avoid all sedative medications if she is drowsy.  -Bed side Spirometry.  -Patient will be evaluated for home BiPAP with with a diagnosis of obesity hypoventilation once her FiO2 requirement is at baseline I.e 4 L/min. Patient will have a baseline arterial blood gas on 4 L of oxygen night before testing. She will be kept off BiPAP with 4 L of oxygen supplementation during the entire night. She is going to have a repeat arterial blood gas next day morning to check for worsening of her PaCO2 >7mmHg to qualify for home BiPAP with a diagnosis of obesity hypoventilation.   -Deep Venous Thrombosis Prophylaxis: Lovenox per primary service    \"Thank you for asking us to see this patient\"     Case discussed with Dr. Stefani mendoza MD and hospital service  - [facility] educated about my impression and plan. She verbalizes understanding. Questions and concerns addressed.     Electronically signed by   Molly Briceño MD on 2/28/2022 at 1:26 PM

## 2022-02-28 NOTE — PROGRESS NOTES
While writer was in the room the patient moved her arm and her IV came senior care out. Writer shut off IVATB right away and madeline back on IV and got blood return and 3MLs into the syringe and removed IV. Skin was slightly red where tape was on one spot; pt stated she reacts to tape. Not signs of infiltration at insertion site. Will monitor for changes.

## 2022-02-28 NOTE — PROGRESS NOTES
Hospitalist Progress Note       Patient:  Regulo Markham      Unit/Bed:4A-10/010-A    YOB: 1980    MRN: [id number]       Acct: [de-identified]     PCP: ROBBIE Corrales CNP    Date of Admission: 2/27/2022    Assessment/Plan:     #Acute Hypoxic, chronic hypercapnic respiratory failure likely 2/2 PNA/COPDe: Presented with worsening SOB and hypoxia s/p recent d/c home 1 day ago. Influenza A/B and COVID-19 negative. CXR demonstrates worsening b/l Patchy pulmonary opacities, probable small Rt pleural effusion. Pt given Vanc/Cefepime, Duoneb, and Decadron 6mg. IVF at 100cc/hr. Placed on [address] 75%/60L w/SpO2>90%. Pt noted to have Bi-apical expiratory wheezes. ABG demonstrates 7.35/76/58/42, Chronic (compensated) respiratory acidosis w/Metabolic alkalosis. - C/w RT therapy and pulmonary hygiene   - IS and Acapella   - C/w home inhalers and treatments   - Wean as tolerated w/goal SpO2 >90%     #B/l Multi-focal PNA, worsening/recurrent CAP w/? parapneumonic effusion of RLL vs. HAP/Aspiration?: Pt recently d/c'd from hospital on 2/25/2022 for Acute Hypoxic RF/COPDe 2/2 PNA, treated w/Rocephin + Azithromycin along w/a steroid taper, discharged w/4LNC. Presents w/worsening SOB and respiratory failure, WBC elevated at 24.2 (above d/c levels), SIRS 3/4 although afebrile and non-toxic appearing, mild Sepsis component resolved from previous admission. Procal 0.07, lactic acid pending.   - Started on Vanc/Cefepime in ED -> will c/w Vanc/Zosyn while IP for better anaerobic coverage at this time     #? COPDe likely 2/2 worsening/recurrent PNA in setting of ? COPD/JACIEL overlap: Denies any previous PFTs, but being treated for COPD by outpatient PCP. Pt was scheduled to f/u w/[facility] pulmonology in 3mos w/full PFT + CT Chest w/contrast to follow PNA and mediastinal LAD (likely reactive) and a separate appt for split night sleep study. Pt was d/c on a steroid taper from previous d/c.  On Proventil, Stiolto, Pulmicort at home. Pt has wheezes but it doesn't feel like a COPDe  - Received Decadron 6mg in ED, Will start Prednisone taper back tomorrow 40mg x 1 dose followed by 30mg x 3 days ->20mg x3 days ->10mg x 3 days   - C/w home medication inhalers / nebulizer   - Pulmonology consulted for concern that undiagnosed JACIEL/OHS may be the culprit in the hypercapnia and worsening presentation     #IDDMII: Last A1c 5.6%. Pt on Lantus 70 units daily, short acting lispro w/meals and SSI. She is also on pioglitazone, Metformin, Januvia, glipizide at home. Given the steroids and infection, will expect a higher BG than normal.   - Lantus 50 units daily, adjust dose as medically indicated  - We will initiate sliding scale, add on mealtime short acting insulin as indicated  - Hypoglycemia protocols, Accu-Cheks  - Held oral BM medications except Metformin     #Secondary Polycythemia likely due to chronic hypoxia: Hgb 16.1 on arrival. Has had previously charted elevated hemoglobins. Peripheral smear pending   - COPD treatment per above     #Depression/anxiety: On home BuSpar, gabapentin  - Continue home medications     #HTN: On home lisinopril 10 mg daily  - Continue home lisinopril     #HLD: On home Lipitor 40 mg  - Continue home statin    Expected discharge date: TBD    Disposition:    [] Home       [] TCU       [] Rehab       [] Psych       [] SNF       [] [facility]       [] Other-    Chief Complaint: Worsening SOB w/hypoxia    Hospital Course:     43yo F w/significant PMHx of HTN, IDDM2, Tobacco abuse (1/2 PPD x 20yrs), Morbid obesity, Anxiety/depression, Secondary Polycythemia, and JACIEL/COPD overlap syndrome who presents to the [facility] ED for SOB/hypoxia. Pt recently d/c'd from hospital on 2/25/2022 for Acute Hypoxic RF/COPDe 2/2 PNA, treated w/Rocephin + Azithromycin along w/a steroid taper, discharged w/4LNC. Pt's son called 911 d/t pt not \"breathing adequately\", Pt SpO2 78% on 6LNC, pt tachycardic.  Of note, on recent admission, pulmonology was seeing pt, she was started on Solumedrol ->Prednisone PO (now on taper), had elevated pro-BNP 2561, LVEF 55-60% (possible diastolic dsfxn?). Home O2 eval completed then w/4L at rest + 6L w/exertion. Pt was scheduled to f/u w/[facility] pulmonoogy in 3mos w/full PFT + CT Chest w/contrast to follow PNA and mediastinal LAD (likely reactive) and a separate appt for split night sleep study.      ED Course: Afebrile, /83, , RR 26. WBC 24.2 (elevated from time of d/c 19.5), Hb/Hct 16.1/54.2, .9, RDW 52, Plts 406, Abs Segs 20.5. CO2 40, procal 0.07, pro-, trops x1 negative, Influenza A/B and COVID-19 negative. CXR demonstrates worsening b/l Patchy pulmonary opacities, probable small Rt pleural effusion. Pt given Vanc/Cefepime, Duoneb, and Decadron 6mg. IVF at 100cc/hr. Placed on [address] [facility] 75%/60L w/SpO2>90%. Pt noted to have Bi-apical expiratory wheezes. ABG demonstrates 7.35/76/58/42, Chronic (compensated) respiratory acidosis w/Metabolic alkalosis. Although pt meets SIRS criteria 3/4 w/suspected source being PNA, pt does not appear to be septic/acutely ill.     Subjective (past 24 hours):      Pt seen and evaluated today at bedside in no acute distress. Pt doing significantly better than when admitted. IV team placed midline access in TYE basilic as she has poor vascular access and peripheral line issues. In further questioning of the family, seems pt was experiencing Kussmaul-like respirations the morning of presentation. Undiagnosed JACIEL/OHS/hypercapnia leading to worsening SOB noted on admission? Pulmonology consulted for evaluation as she was not home long enough after previous d/c to have PFTs and Sleep study completed. Would likely benefit from CPAP if so. HHFNC 50%/60L w/SpO2 >90%.      Medications:  Reviewed    Infusion Medications    sodium chloride      sodium chloride 100 mL/hr at 02/27/22 1222    dextrose       Scheduled Medications    sodium chloride flush  5-40 mL IntraVENous 2 times per day    enoxaparin  40 mg SubCUTAneous Daily    amitriptyline  10 mg Oral Nightly    aspirin  81 mg Oral Daily    atorvastatin  40 mg Oral Nightly    budesonide  0.5 mg Nebulization BID    dilTIAZem  30 mg Oral TID    gabapentin  800 mg Oral TID    hydrALAZINE  50 mg Oral 3 times per day    lisinopril  10 mg Oral Daily    metoprolol  100 mg Oral BID    OLANZapine  10 mg Oral Nightly    tiotropium-olodaterol  2 puff Inhalation Daily    nicotine  1 patch TransDERmal Daily    metFORMIN  2,000 mg Oral BID WC    piperacillin-tazobactam  3,375 mg IntraVENous Q8H    insulin glargine  50 Units SubCUTAneous Nightly    insulin lispro  0-6 Units SubCUTAneous TID WC    insulin lispro  0-3 Units SubCUTAneous Nightly    predniSONE  20 mg Oral BID    albuterol  2.5 mg Nebulization TID     PRN Meds: sodium chloride flush, sodium chloride, polyethylene glycol, acetaminophen **OR** acetaminophen, albuterol, ondansetron, glucose, glucagon (rDNA), dextrose, dextrose bolus (hypoglycemia) **OR** dextrose bolus (hypoglycemia)    No intake or output data in the 24 hours ending 02/28/22 1416    Diet:  ADULT DIET; Regular    Exam:  /65   Pulse 92   Temp 98.5 °F (36.9 °C) (Oral)   Resp 18   Ht 5' 1\" (1.549 m)   Wt 270 lb (122.5 kg)   SpO2 96%   BMI 51.02 kg/m²     General appearance: No apparent distress, appears stated age and cooperative. Morbid obesity   HEENT: Pupils equal, round, and reactive to light. Conjunctivae/corneas clear. Neck: Supple, with full range of motion. No jugular venous distention. Trachea midline. Respiratory:  Normal respiratory effort. Clear to auscultation, bilaterally without Rales/Rhonchi. Mild wheezes noted  Cardiovascular: Regular rate and rhythm with normal S1/S2 without murmurs, rubs or gallops. Abdomen: Soft, non-tender, non-distended with normal bowel sounds. Protuberant   Musculoskeletal: passive and active ROM x 4 extremities.   Skin: Skin color, texture, turgor normal.    Neurologic:  Neurovascularly intact without any focal sensory/motor deficits. Cranial nerves: II-XII intact, grossly non-focal.  Psychiatric: Alert and oriented, thought content appropriate  Capillary Refill: Brisk,< 3 seconds   Peripheral Pulses: +2 palpable, equal bilaterally       Labs:   Recent Labs     02/27/22  1100 02/28/22  0501   WBC 24.2* 22.0*   HGB 16.1* 15.5   HCT 54.2* 51.8*   * 390     Recent Labs     02/27/22  1100 02/28/22  0501    136   K 5.0 5.0   CL 92* 92*   CO2 40* 38*   BUN 19 17   CREATININE 0.6 0.5   CALCIUM 9.0 8.9     No results for input(s): AST, ALT, BILIDIR, BILITOT, ALKPHOS in the last 72 hours. No results for input(s): INR in the last 72 hours. No results for input(s): Xu Killings in the last 72 hours. Recent Labs     02/27/22  1100   PROCAL 0.07       Microbiology:      Urinalysis:      Lab Results   Component Value Date    NITRU NEGATIVE 08/08/2018    WBCUA 10-15 08/08/2018    WBCUA 5-10 05/31/2012    BACTERIA FEW 08/08/2018    RBCUA 0-2 08/08/2018    BLOODU TRACE 08/08/2018    SPECGRAV >=1.030 01/04/2013    GLUCOSEU >= 1000 08/08/2018       Radiology:  XR CHEST PORTABLE   Final Result   Worsening bilateral patchy pulmonary opacities. There is a probable small right-sided pleural effusion. This could represent pulmonary edema versus infection. **This report has been created using voice recognition software. It may contain minor errors which are inherent in voice recognition technology. **      Final report electronically signed by Dr. Hailey Ashley on 2/27/2022 11:33 AM          DVT prophylaxis: [x] Lovenox                                 [] SCDs                                 [] SQ Heparin                                 [] Encourage ambulation           [] Already on Anticoagulation     Code Status: Full Code    Tele:   [x] yes             [] no     Active Hospital Problems    Diagnosis Date Noted    Acute respiratory failure with hypoxia ([facility] [J96.01] 02/18/2022       Electronically signed by Maria T Spence MD on 2/28/2022 at 2:16 PM

## 2022-02-28 NOTE — CARE COORDINATION
2/28/22, 1:49 PM EST  DISCHARGE PLANNING EVALUATION:    Marie Lang       Admitted: 2/27/2022/ [address] day: 1   Location: [facility] Reason for admit: Hypoxia [R09.02]  Acute respiratory failure with hypoxia ([facility] [J96.01]  Pneumonia of both lungs due to infectious organism, unspecified part of lung [J18.9]   PMH:  has a past medical history of Anxiety, Depression, Diabetes mellitus ([address] Hyperlipidemia, and Hypertension. Procedure:   CXR    Impression:       Worsening bilateral patchy pulmonary opacities. There is a probable small right-sided pleural effusion. This could represent pulmonary edema versus infection. 2/28 Midline IV insertion    Barriers to Discharge:  From ED, Covid (-), WBC 22.0, High flow oxygen at 60% FiO2, 60 liters oxygen with saturations at 96%, Acapella, Pulmonology consult, diabetes management, telemetry. IV fluids, Albuterol, Pulmicort nebs, Lovenox, IV Zosyn, Maxipime. PCP: ROBBIE Bowles CNP  Readmission Risk Score: 17.8 ( )%    Patient Goals/Plan/Treatment Preferences: Met with Lamine Calli. She currently lives at home with her children. She uses a walker at times and has home oxygen at 4 liters supplied by [facility]. Plan is to return home at discharge. She denies need for DME and declines HH. Will follow for potential needs. Transportation/Food Security/Housekeeping Addressed:  No issues identified.

## 2022-02-28 NOTE — PROGRESS NOTES
IV team not successful with peripheral IV placement. IV RN recommended placing midline. Dr. Masoud Obregon notified. New orders received for PICC -Midline OK.  completed. Dr. Masoud Obregon also notified no cough noted during assessment. Unable to obtain sample for pneumonia panel at this time. Incentive spirometer is at bedside. Patient educated on use. Patient was able to demonstrate correct use. Patient encouraged to use 10x/hr. She verbalized understanding. Acapella at bedside. Patient educated on use. Patient was able to demonstrate correct use. Patient encouraged to use 10x/hr. She verbalized understanding.      Patient given specimen cup incase she is able to expectorate sample for test.

## 2022-02-28 NOTE — CARE COORDINATION
02/28/22 1357   Readmission Assessment   Number of Days since last admission? 1-7 days   Previous Disposition Home with Family   Who is being Interviewed Patient   What was the patient's/caregiver's perception as to why they think they needed to return back to the hospital? Other (Comment)  (started coughing  a lot)   Did you visit your Primary Care Physician after you left the hospital, before you returned this time? No   Why weren't you able to visit your PCP? Other (Comment)  (appt was scheduled. was just discharged)   Did you see a specialist, such as Cardiac, Pulmonary, Orthopedic Physician, etc. after you left the hospital? Yes  (Pulmonology)   Who advised the patient to return to the hospital? Self-referral   Does the patient report anything that got in the way of taking their medications? No   In our efforts to provide the best possible care to you and others like you, can you think of anything that we could have done to help you after you left the hospital the first time, so that you might not have needed to return so soon?  Other (Comment)  (none)

## 2022-02-28 NOTE — PROGRESS NOTES
Midline insertion Procedure Note    Theresa Casillas   Admitted- 2/27/2022 10:41 AM  Admission diagnosis- Hypoxia [R09.02]  Acute respiratory failure with hypoxia ([address] [J96.01]  Pneumonia of both lungs due to infectious organism, unspecified part of lung [J18.9]      Attending Physician- Hector Alexander DO  Ordering Physician-same  Indication for Insertion: Poor Vascular Access    Catheter Insertion Date- 2/28/2022   Catheter Brand-ARROW  Lot Number- [id number]  Gauge-5  Lumen-dual    Insertion Site- YTE Basilic  Vein Diameter- 4.11 cm  Catheter Length- 15 cm  Internal Length- 14 cm  Exposed Catheter Length- 1cm   Midline Tip Terminates in the Axillary- Yes  Upper Arm Circumference- 43 cm  Easy insertion- Yes  Able to Aspirate blood- Yes  Easy Flush- Yes    Midline insertion successful- Yes  Ultrasound- yes    Okay To Use Midline- Yes    Electronically signed by Dillan Barnes, RN, RN on 2/28/2022 at 11:27 AM

## 2022-03-01 LAB
BASOPHILS # BLD: 0.3 %
BASOPHILS ABSOLUTE: 0.1 THOU/MM3 (ref 0–0.1)
EOSINOPHIL # BLD: 0.2 %
EOSINOPHILS ABSOLUTE: 0 THOU/MM3 (ref 0–0.4)
ERYTHROCYTE [DISTWIDTH] IN BLOOD BY AUTOMATED COUNT: 13.5 % (ref 11.5–14.5)
ERYTHROCYTE [DISTWIDTH] IN BLOOD BY AUTOMATED COUNT: 49.2 FL (ref 35–45)
GLUCOSE BLD-MCNC: 130 MG/DL (ref 70–108)
GLUCOSE BLD-MCNC: 133 MG/DL (ref 70–108)
GLUCOSE BLD-MCNC: 180 MG/DL (ref 70–108)
GLUCOSE BLD-MCNC: 184 MG/DL (ref 70–108)
GLUCOSE BLD-MCNC: 293 MG/DL (ref 70–108)
HCT VFR BLD CALC: 48.5 % (ref 37–47)
HEMOGLOBIN: 14.9 GM/DL (ref 12–16)
IMMATURE GRANS (ABS): 0.16 THOU/MM3 (ref 0–0.07)
IMMATURE GRANULOCYTES: 0.7 %
LYMPHOCYTES # BLD: 9.2 %
LYMPHOCYTES ABSOLUTE: 2.2 THOU/MM3 (ref 1–4.8)
MCH RBC QN AUTO: 30.6 PG (ref 26–33)
MCHC RBC AUTO-ENTMCNC: 30.7 GM/DL (ref 32.2–35.5)
MCV RBC AUTO: 99.6 FL (ref 81–99)
MONOCYTES # BLD: 4.9 %
MONOCYTES ABSOLUTE: 1.2 THOU/MM3 (ref 0.4–1.3)
NUCLEATED RED BLOOD CELLS: 0 /100 WBC
PLATELET # BLD: 365 THOU/MM3 (ref 130–400)
PMV BLD AUTO: 9.5 FL (ref 9.4–12.4)
RBC # BLD: 4.87 MILL/MM3 (ref 4.2–5.4)
SEG NEUTROPHILS: 84.7 %
SEGMENTED NEUTROPHILS ABSOLUTE COUNT: 20.2 THOU/MM3 (ref 1.8–7.7)
WBC # BLD: 23.9 THOU/MM3 (ref 4.8–10.8)

## 2022-03-01 PROCEDURE — 2580000003 HC RX 258: Performed by: INTERNAL MEDICINE

## 2022-03-01 PROCEDURE — 6370000000 HC RX 637 (ALT 250 FOR IP): Performed by: INTERNAL MEDICINE

## 2022-03-01 PROCEDURE — 2060000000 HC ICU INTERMEDIATE R&B

## 2022-03-01 PROCEDURE — APPSS30 APP SPLIT SHARED TIME 16-30 MINUTES: Performed by: NURSE PRACTITIONER

## 2022-03-01 PROCEDURE — 6370000000 HC RX 637 (ALT 250 FOR IP): Performed by: NURSE PRACTITIONER

## 2022-03-01 PROCEDURE — 94640 AIRWAY INHALATION TREATMENT: CPT

## 2022-03-01 PROCEDURE — 85025 COMPLETE CBC W/AUTO DIFF WBC: CPT

## 2022-03-01 PROCEDURE — 2580000003 HC RX 258: Performed by: STUDENT IN AN ORGANIZED HEALTH CARE EDUCATION/TRAINING PROGRAM

## 2022-03-01 PROCEDURE — 6360000002 HC RX W HCPCS: Performed by: STUDENT IN AN ORGANIZED HEALTH CARE EDUCATION/TRAINING PROGRAM

## 2022-03-01 PROCEDURE — 99232 SBSQ HOSP IP/OBS MODERATE 35: CPT | Performed by: INTERNAL MEDICINE

## 2022-03-01 PROCEDURE — 82948 REAGENT STRIP/BLOOD GLUCOSE: CPT

## 2022-03-01 PROCEDURE — 6370000000 HC RX 637 (ALT 250 FOR IP): Performed by: STUDENT IN AN ORGANIZED HEALTH CARE EDUCATION/TRAINING PROGRAM

## 2022-03-01 PROCEDURE — 94669 MECHANICAL CHEST WALL OSCILL: CPT

## 2022-03-01 PROCEDURE — 6360000002 HC RX W HCPCS: Performed by: NURSE PRACTITIONER

## 2022-03-01 PROCEDURE — 94760 N-INVAS EAR/PLS OXIMETRY 1: CPT

## 2022-03-01 PROCEDURE — 2700000000 HC OXYGEN THERAPY PER DAY

## 2022-03-01 PROCEDURE — 36591 DRAW BLOOD OFF VENOUS DEVICE: CPT

## 2022-03-01 RX ORDER — PREDNISONE 20 MG/1
40 TABLET ORAL DAILY
Status: DISCONTINUED | OUTPATIENT
Start: 2022-03-01 | End: 2022-03-02 | Stop reason: HOSPADM

## 2022-03-01 RX ORDER — BUDESONIDE 0.5 MG/2ML
0.5 INHALANT ORAL 2 TIMES DAILY
Status: DISCONTINUED | OUTPATIENT
Start: 2022-03-01 | End: 2022-03-02 | Stop reason: HOSPADM

## 2022-03-01 RX ADMIN — INSULIN GLARGINE 50 UNITS: 100 INJECTION, SOLUTION SUBCUTANEOUS at 20:41

## 2022-03-01 RX ADMIN — SODIUM CHLORIDE: 9 INJECTION, SOLUTION INTRAVENOUS at 20:41

## 2022-03-01 RX ADMIN — GABAPENTIN 800 MG: 400 CAPSULE ORAL at 08:30

## 2022-03-01 RX ADMIN — DILTIAZEM HYDROCHLORIDE 30 MG: 30 TABLET, FILM COATED ORAL at 14:23

## 2022-03-01 RX ADMIN — IPRATROPIUM BROMIDE AND ALBUTEROL SULFATE 1 AMPULE: .5; 3 SOLUTION RESPIRATORY (INHALATION) at 16:06

## 2022-03-01 RX ADMIN — GUAIFENESIN 600 MG: 600 TABLET, EXTENDED RELEASE ORAL at 20:34

## 2022-03-01 RX ADMIN — SODIUM CHLORIDE: 9 INJECTION, SOLUTION INTRAVENOUS at 10:54

## 2022-03-01 RX ADMIN — LISINOPRIL 10 MG: 10 TABLET ORAL at 08:30

## 2022-03-01 RX ADMIN — GABAPENTIN 800 MG: 400 CAPSULE ORAL at 20:34

## 2022-03-01 RX ADMIN — ATORVASTATIN CALCIUM 40 MG: 40 TABLET, FILM COATED ORAL at 20:34

## 2022-03-01 RX ADMIN — METFORMIN HYDROCHLORIDE 2000 MG: 500 TABLET ORAL at 08:31

## 2022-03-01 RX ADMIN — IPRATROPIUM BROMIDE AND ALBUTEROL SULFATE 1 AMPULE: .5; 3 SOLUTION RESPIRATORY (INHALATION) at 12:45

## 2022-03-01 RX ADMIN — GABAPENTIN 800 MG: 400 CAPSULE ORAL at 14:23

## 2022-03-01 RX ADMIN — METOPROLOL TARTRATE 100 MG: 100 TABLET, FILM COATED ORAL at 08:31

## 2022-03-01 RX ADMIN — SODIUM CHLORIDE, PRESERVATIVE FREE 10 ML: 5 INJECTION INTRAVENOUS at 20:35

## 2022-03-01 RX ADMIN — IPRATROPIUM BROMIDE AND ALBUTEROL SULFATE 1 AMPULE: .5; 3 SOLUTION RESPIRATORY (INHALATION) at 07:57

## 2022-03-01 RX ADMIN — HYDRALAZINE HYDROCHLORIDE 50 MG: 50 TABLET, FILM COATED ORAL at 23:03

## 2022-03-01 RX ADMIN — IPRATROPIUM BROMIDE AND ALBUTEROL SULFATE 1 AMPULE: .5; 3 SOLUTION RESPIRATORY (INHALATION) at 19:35

## 2022-03-01 RX ADMIN — SODIUM CHLORIDE: 9 INJECTION, SOLUTION INTRAVENOUS at 00:46

## 2022-03-01 RX ADMIN — HYDRALAZINE HYDROCHLORIDE 50 MG: 50 TABLET, FILM COATED ORAL at 06:24

## 2022-03-01 RX ADMIN — PIPERACILLIN AND TAZOBACTAM 3375 MG: 3; .375 INJECTION, POWDER, LYOPHILIZED, FOR SOLUTION INTRAVENOUS at 04:07

## 2022-03-01 RX ADMIN — INSULIN LISPRO 1 UNITS: 100 INJECTION, SOLUTION INTRAVENOUS; SUBCUTANEOUS at 07:57

## 2022-03-01 RX ADMIN — PREDNISONE 40 MG: 20 TABLET ORAL at 14:25

## 2022-03-01 RX ADMIN — METOPROLOL TARTRATE 100 MG: 100 TABLET, FILM COATED ORAL at 20:34

## 2022-03-01 RX ADMIN — OLANZAPINE 10 MG: 10 TABLET, FILM COATED ORAL at 20:34

## 2022-03-01 RX ADMIN — DILTIAZEM HYDROCHLORIDE 30 MG: 30 TABLET, FILM COATED ORAL at 20:34

## 2022-03-01 RX ADMIN — DILTIAZEM HYDROCHLORIDE 30 MG: 30 TABLET, FILM COATED ORAL at 08:29

## 2022-03-01 RX ADMIN — BUDESONIDE 500 MCG: 0.5 INHALANT RESPIRATORY (INHALATION) at 19:35

## 2022-03-01 RX ADMIN — GUAIFENESIN 600 MG: 600 TABLET, EXTENDED RELEASE ORAL at 08:30

## 2022-03-01 RX ADMIN — ENOXAPARIN SODIUM 40 MG: 100 INJECTION SUBCUTANEOUS at 08:29

## 2022-03-01 RX ADMIN — AMITRIPTYLINE HYDROCHLORIDE 10 MG: 10 TABLET, FILM COATED ORAL at 20:34

## 2022-03-01 RX ADMIN — ASPIRIN 81 MG: 81 TABLET, COATED ORAL at 08:29

## 2022-03-01 RX ADMIN — HYDRALAZINE HYDROCHLORIDE 50 MG: 50 TABLET, FILM COATED ORAL at 14:22

## 2022-03-01 ASSESSMENT — PAIN SCALES - GENERAL
PAINLEVEL_OUTOF10: 0

## 2022-03-01 NOTE — PLAN OF CARE
Problem: Falls - Risk of:  Goal: Will remain free from falls  Description: Will remain free from falls  Outcome: Ongoing  Goal: Absence of physical injury  Description: Absence of physical injury  Outcome: Ongoing     Problem: Infection - Central Venous Catheter-Associated Bloodstream Infection:  Goal: Will show no infection signs and symptoms  Description: Will show no infection signs and symptoms  Outcome: Ongoing     Problem: DISCHARGE BARRIERS  Goal: Patient's continuum of care needs are met  3/1/2022 1455 by Vesna Del Castillo  Outcome: Ongoing  3/1/2022 1232 by LEO Barajas  Outcome: Ongoing

## 2022-03-01 NOTE — CARE COORDINATION
DISCHARGE/PLANNING EVALUATION  3/1/22, 12:34 PM EST    Reason for Referral: Current with [facility]  Mental Status: Answered questions appropriately. Decision Making: Independent  Family/Social/Home Environment: Lives at home with her two sons. One is an adult and one is still in school. They are not very helpful. Current Services including food security, transportation and housekeeping: Current with [facility]. Verified services with [facility]. She has orders for RN, PT, OT and aid. Her step dad or friends provide her transport as needed. Current Equipment:walker, shower chair. Payment [address]  Concerns or Barriers to Discharge: none  Post acute provider list with quality measures, geographic area and applicable managed care information provided. Questions regarding selection process answered:current with [facility]. Teach Back Method used with Emma Melo regarding care plan and discharge planning. Patient verbalized understanding of the plan of care and contribute to goal setting. Patient goals, treatment preferences and discharge plan: Emma Melo plans to return home at discharge with current [facility] for RN, PT,OT and aid. She lives with her sons. Electronically signed by LEO Sung on 3/1/2022 at 12:34 PM

## 2022-03-01 NOTE — PROGRESS NOTES
Hospitalist Progress Note    Patient:  Geraldine Madsen      Unit/Bed:4A-10/010-A    YOB: 1980    MRN: [id number]       Acct: [de-identified]     PCP: ROBBIE Metcalf CNP    Date of Admission: 2/27/2022    Assessment/Plan:    1. Acute on subacute hypoxic, chronic hypercapnic respiratory failure - likely secondary to underlying OHS/JACIEL. Recently discharged 2/25/22 for AHRF 2/2 suspected COPD vs PNA s/p antibiotics and on 4 L NC. Presented worsening S OB with SPO2 78%. Escalated to HFNC. CXR significant for bilateral patchy pulmonary opacities, probable small right pleural effusion. Given vancomycin/cefepime, duonebs, and Decadron 6 mg. Started on IV fluids. Initial ABGs pH 7.35, PCO2 76, PO2 58, HCO3 42 - indicative of chronic compensated respiratory acidosis with metabolic alkalosis. Pulmonology consulted. - Currently on 6L NC. Continue wean as tolerated for SPO2 >92%      - Encourage pulmonary hygiene with IS and Acapella      - Cultures not collected      - Pulmonology following, on DuoNeb every 4 hours, Pulmicort DuoNeb twice daily, prednisone 40 mg p.o. x5. Started on BiPAP      - Will require new home O2 eval at discharge. - Split-night sleep study outpatient with 8-10-week follow-up at sleep clinic for PAP . 2.  HTN - currently on diltiazem 30 mg p.o. 3 times daily, hydralazine 50 mg p.o. 3 times daily, lisinopril 10 mg p.o. daily, and metoprolol 100 mg twice daily. 3.  HLD - on home Lipitor 40 mg p.o. daily     4. IDDMII - last HgbA1c 5.6%. Continue Lantus 50 units subcu nightly, low-dose insulin sliding scale, Metformin 2000 mg p.o. twice daily and Accu-Cheks. Monitor glucose with CBC. Hypoglycemic protocols initiated    5. Leukocytosis - likely reactive to recent steroid use. Afebrile, no active signs of infection. Will monitor with CBC. Zosyn discontinued    6.  Anxiety/Depression - on olanzapine 10 mg p.o. nightly and amitriptyline 10 mg p.o. nightly    7. Peripheral Neuropathy - on gabapentin 8 mg p.o. 3 times daily         Expected discharge date:  3/2/2022    Disposition:    [x] Home       [] TCU       [] Rehab       [] Psych       [] SNF       [] [place]       [] Other-    Chief Complaint: Worsening shortness of breath    Hospital Course: The patient is a 81-year-old female with a past medical history of HTN, IDDM 2, tobacco abuse, anxiety/depression, and morbid obesity who presented to [facility] ED for worsening shortness of breath. Per report patients son called 911 due to patient not\" breathing adequately\". Pulmonology had been following since prior admission, see below. Of note, the patient was recently discharged from hospital 2/25/2022 for acute hypoxic respiratory failure secondary to COPD secondary to pneumonia. At the time she was treated with Rocephin and azithromycin along with steroid taper (transition from Solu-Medrol to prednisone p.o. taper) and discharged on 4 L NC. In ED afebrile, , RR 26 with SPO2 78% on 6 L NC and escalated to HFNC. Labs significant for WBC 24.2. Pro-Shashi 0.07.  proBNP 422.1. Troponin negative x1. Influenza a/B and COVID-19 negative. CXR demonstrated bilateral patchy pulmonary opacities, probable small right pleural effusion. She was started on vancomycin/cefepime, duo nebs and Decadron 6 mg. IVF was started for fluid resuscitation. ABG significant for pH 7.35, PCO2 76, PO2 58, HCO3 42. Patient admitted for further evaluation management. Pulmonary was consulted. Subjective (past 24 hours):   Overnight per RN the patient was not able to tolerate BiPAP, and required to be restarted on HFNC. This morning patient states she is doing well. She denies any current shortness of breath. She was evaluated by pulmonology. Will attempt trial of O2 6 L NC today. Encouraged patient to continue IS, Acapella and BiPAP use.   Otherwise vitals WNL with SPO2 93% on HFNC at 60 L and FiO2 45%. ROS (12 point review of systems completed. Pertinent positives noted. Otherwise ROS is negative). Medications:  Reviewed    Infusion Medications    sodium chloride      sodium chloride 100 mL/hr at 03/01/22 1054    dextrose       Scheduled Medications    predniSONE  40 mg Oral Daily    budesonide  0.5 mg Nebulization BID    sodium chloride flush  5-40 mL IntraVENous 2 times per day    ipratropium-albuterol  1 ampule Inhalation Q4H WA    guaiFENesin  600 mg Oral BID    enoxaparin  40 mg SubCUTAneous Daily    amitriptyline  10 mg Oral Nightly    aspirin  81 mg Oral Daily    atorvastatin  40 mg Oral Nightly    dilTIAZem  30 mg Oral TID    gabapentin  800 mg Oral TID    hydrALAZINE  50 mg Oral 3 times per day    lisinopril  10 mg Oral Daily    metoprolol  100 mg Oral BID    OLANZapine  10 mg Oral Nightly    nicotine  1 patch TransDERmal Daily    metFORMIN  2,000 mg Oral BID WC    insulin glargine  50 Units SubCUTAneous Nightly    insulin lispro  0-6 Units SubCUTAneous TID WC    insulin lispro  0-3 Units SubCUTAneous Nightly     PRN Meds: sodium chloride flush, sodium chloride, polyethylene glycol, acetaminophen **OR** acetaminophen, ondansetron, glucose, glucagon (rDNA), dextrose, dextrose bolus (hypoglycemia) **OR** dextrose bolus (hypoglycemia)      Intake/Output Summary (Last 24 hours) at 3/1/2022 1511  Last data filed at 3/1/2022 0853  Gross per 24 hour   Intake 2103.28 ml   Output 1200 ml   Net 903.28 ml       Diet:  ADULT DIET; Regular    Exam:  /78   Pulse 94   Temp 97.5 °F (36.4 °C) (Axillary)   Resp 18   Ht 5' 1\" (1.549 m)   Wt 270 lb (122.5 kg)   SpO2 96%   BMI 51.02 kg/m²     General appearance: No apparent distress, appears stated age and cooperative. Morbid obesity  HEENT: Pupils equal, round, and reactive to light. Conjunctivae/corneas clear. Neck: Supple, with full range of motion. No jugular venous distention. Trachea midline.   Respiratory: Decreased respiratory effort. Diminished breath sounds appreciated bilaterally with mild wheezes noted. Cardiovascular: Regular rate and rhythm with normal S1/S2 without murmurs, rubs or gallops. Abdomen: Soft, non-tender, non-distended with normal bowel sounds. Musculoskeletal: passive and active ROM x 4 extremities. Skin: Skin color, texture, turgor normal.  No rashes or lesions. Neurologic:  Neurovascularly intact without any focal sensory/motor deficits. Cranial nerves: II-XII intact, grossly non-focal.  Psychiatric: Alert and oriented, thought content appropriate, normal insight  Capillary Refill: Brisk,< 3 seconds   Peripheral Pulses: +2 palpable, equal bilaterally       Labs:   Recent Labs     02/27/22  1100 02/28/22  0501 03/01/22  0335   WBC 24.2* 22.0* 23.9*   HGB 16.1* 15.5 14.9   HCT 54.2* 51.8* 48.5*   * 390 365     Recent Labs     02/27/22  1100 02/28/22  0501    136   K 5.0 5.0   CL 92* 92*   CO2 40* 38*   BUN 19 17   CREATININE 0.6 0.5   CALCIUM 9.0 8.9     No results for input(s): AST, ALT, BILIDIR, BILITOT, ALKPHOS in the last 72 hours. No results for input(s): INR in the last 72 hours. No results for input(s): [place] in the last 72 hours. Microbiology:    2/27/2022 rapid influenza A/B antigens negative  2/27/2022 COVID-19 rapid negative    Urinalysis:      Lab Results   Component Value Date    NITRU NEGATIVE 08/08/2018    WBCUA 10-15 08/08/2018    WBCUA 5-10 05/31/2012    BACTERIA FEW 08/08/2018    RBCUA 0-2 08/08/2018    BLOODU TRACE 08/08/2018    SPECGRAV >=1.030 01/04/2013    GLUCOSEU >= 1000 08/08/2018       Radiology:  XR CHEST PORTABLE   Final Result   Worsening bilateral patchy pulmonary opacities. There is a probable small right-sided pleural effusion. This could represent pulmonary edema versus infection. **This report has been created using voice recognition software.  It may contain minor errors which are inherent in voice recognition technology. **      Final report electronically signed by Dr. Anali Ding on 2/27/2022 11:33 AM          DVT prophylaxis: [x] Lovenox                                 [] SCDs                                 [] SQ Heparin                                 [] Encourage ambulation           [] Already on Anticoagulation     Code Status: Full Code    PT/OT Eval Status: N/A    Tele:   [] yes             [x] no    Electronically signed by Moses Raphael DO on 3/1/2022 at 3:11 PM

## 2022-03-01 NOTE — PROGRESS NOTES
[facility]    Patient - Luisa Marie   MRN -  [id number]   [facility] # - [de-identified]   - 1980      Date of Admission -  2022 10:41 AM  Date of evaluation -  3/1/2022  Room - -10/[address] [place],  Primary Care Physician - ROBBIE Perez - CNP     Problem List      Active Hospital Problems    Diagnosis Date Noted    Acute respiratory failure with hypoxia [facility]) [J96.01] 2022     Reason for Consult    For management of respiratory distress. She is on high flow  HPI   History Obtained From: Patient and electronic medical record. Luisa Marie is a 37 y.o. female  was initially admitted under hospitalist service. Pulmonary medicine was consulted for further management of respiratory distress and high flow    She  is a 41 y.o. female with a past medical history of chronic tobacco smoking for 22 years with 1 pack of cigarettes per day. Quit smoking 2 weeks back was seen by Dr. Michel Gandhi MD during her previous hospitalization. She had a PMH of hypertension, diabetes mellitus type 2 and ?COPD. She was recently admitted and discharged from [facility]. During her previous hospitalization she was treated for a COPD exacerbation along with hypercapnic respiratory failure. She was scheduled for outpatient split-night sleep study to evaluate for sleep apnea and treat with the PAP device. She was discharged from [facility] on 2022. She came back to emergency room at [facility] on 2022 with worsening of shortness of breath and hypoxia. On further questioning patient told me that after going home she started noticing worsening of cough with yellow sputum production. She also noted worsening of shortness of breath. During her previous hospitalization she was treated with Rocephin and Zithromax along with a tapering dose of steroids. She was discharged home with 4 L of oxygen on nasal cannula. Patient's son called 911 due to development of tachypnea and hypoxia. Patient was placed on high flow oxygen. She was also placed on Decadron 6 mg IV daily. Pulmonary medicine was called for further evaluation.     She is currently working at [employer] in [facility]. She denied any significant exposure to aerosolized particles or hazardous fumes. 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 She initially presented to [place], [place] with worsening of shortness of breath. Onset: gradual   Duration: 1 day after discharge from the hospital  Diurnal variation: None  Current functional capacity on level ground: Very limited on level ground. She admits to orthopnea. She admits to paroxysmal nocturnal dyspnea. She is having cough: Yes  Duration of cough: for few days. Her cough is associated with sputum production: Yes    The sputum color: yellow  Hemoptysis: No.  Diurnal variation: No    She is having chest pain: No    She gives a history of fever: No  Chills & rigors: No  Associated night sweats: No.        Sleep medicine HPI:    Sleep apnea symptoms:  Noticed to have loud snoring:Yes. Witnessed apneas during sleep noticed: Yes. History of choking and gasping sensation at night time: Yes. History of headaches in the morning:Yes. History of dry mouth in the morning: Yes. History of palpitations during night time/nocturnal awakenings: Yes. History of sweating during night time/nocturnal awakenings:  Yes     General:  History of head injury in the past: No.   Associated loss of consciousness with head injury: No.  History of seizures: No.   Rest less legs syndrome symptoms:NO  History suggestive of periodic limb movements during sleep: NO  History suggestive of hypnagogic hallucinations: NO  History suggestive of hypnopompic hallucinations: NO  History suggestive of sleep talking:NO  History suggestive of sleep walking:NO  History suggestive of bruxism: No.  History suggestive of cataplexy: NO  History suggestive of sleep paralysis: NO     History regarding old sleep studies:  Prior history of sleep study: No.  Using CPAP device: No.  Currently using home Oxygen: NO.        Patient considerations:  Is the patient is ambulatory: Yes  Patient is currently using: None of these Wheelchair, Carolina Castellani or [employer]. Para/Quadriplegic: NO  Hearing deficit : NO  Claustrophobic: NO  MDD : NO  Blind: NO  Incontinent: NO  Para/Quadraplegi: NO.   Need transportation to and from Sleep Center:NO     [place] Sleepiness Score:   Sitting and readin  Watching TV:3  Sitting inactive in a public place:3  Being a passenger in a motor vehicle for an hour or more:3  Lying down in the afternoon:3  Sitting and talking to someone:1  Sitting quietly after lunch (no alcohol):1  Stopped for a few minutes in traffic while drivin  Total SZGXS:10     Mallampati Score: 4   Neck Circumference:20 Inches. Past 24 Hours   -BiPAP use overnight  -SOB improved   -HFNC 45% 60LPM- 96%    -All systems reviewed.      PMHx   Past Medical History      Diagnosis Date    Anxiety     Depression     Diabetes mellitus (HCC)     Hyperlipidemia     Hypertension       Past Surgical History        Procedure Laterality Date    ABSCESS DRAINAGE       Meds    Current Medications    predniSONE  40 mg Oral Daily    budesonide  0.5 mg Nebulization BID    sodium chloride flush  5-40 mL IntraVENous 2 times per day    ipratropium-albuterol  1 ampule Inhalation Q4H WA    guaiFENesin  600 mg Oral BID    enoxaparin  40 mg SubCUTAneous Daily    amitriptyline  10 mg Oral Nightly    aspirin  81 mg Oral Daily    atorvastatin  40 mg Oral Nightly    dilTIAZem  30 mg Oral TID    gabapentin  800 mg Oral TID    hydrALAZINE  50 mg Oral 3 times per day    lisinopril 10 mg Oral Daily    metoprolol  100 mg Oral BID    OLANZapine  10 mg Oral Nightly    nicotine  1 patch TransDERmal Daily    metFORMIN  2,000 mg Oral BID     insulin glargine  50 Units SubCUTAneous Nightly    insulin lispro  0-6 Units SubCUTAneous TID     insulin lispro  0-3 Units SubCUTAneous Nightly     sodium chloride flush, sodium chloride, polyethylene glycol, acetaminophen **OR** acetaminophen, ondansetron, glucose, glucagon (rDNA), dextrose, dextrose bolus (hypoglycemia) **OR** dextrose bolus (hypoglycemia)  IV Drips/Infusions   sodium chloride      sodium chloride 100 mL/hr at 03/01/22 1054    dextrose       Home Medications  Medications Prior to Admission: budesonide (PULMICORT) 0.5 MG/2ML nebulizer suspension, Take 2 mLs by nebulization 2 times daily  tiotropium-olodaterol (STIOLTO) 2.5-2.5 MCG/ACT AERS, Inhale 2 puffs into the lungs daily  hydrALAZINE (APRESOLINE) 50 MG tablet, Take 1 tablet by mouth every 8 hours  predniSONE (DELTASONE) 10 MG tablet, Take 4 tablets by mouth daily for 3 days, THEN 3 tablets daily for 3 days, THEN 2 tablets daily for 3 days, THEN 1 tablet daily for 3 days. nicotine (NICODERM CQ) 14 MG/24HR, Place 1 patch onto the skin daily  albuterol (PROVENTIL) (2.5 MG/3ML) 0.083% nebulizer solution, Take 3 mLs by nebulization every 4 hours as needed for Wheezing  albuterol (PROVENTIL) (2.5 MG/3ML) 0.083% nebulizer solution, Take 3 mLs by nebulization 2 times daily  pregabalin (LYRICA) 100 MG capsule, Take 200 mg by mouth 2 times daily.   ibuprofen (ADVIL;MOTRIN) 600 MG tablet, Take 1 tablet by mouth 3 times daily as needed for Pain  lidocaine (LIDODERM) 5 %, Place 1 patch onto the skin daily 12 hours on, 12 hours off.  ondansetron (ZOFRAN ODT) 4 MG disintegrating tablet, Take 1 tablet by mouth every 8 hours as needed for Nausea or Vomiting  Lactobacillus (PROBIOTIC ACIDOPHILUS) CAPS, Take 1 capsule by mouth daily  metoprolol (LOPRESSOR) 100 MG tablet, Take 1 tablet by mouth 2 times daily Hold sbp less 110 HR less than 55  diltiazem (CARDIZEM) 30 MG tablet, Take 1 tablet by mouth 3 times daily  ivabradine (CORLANOR) 5 MG TABS tablet, Take 1 tablet by mouth 2 times daily (with meals)  diclofenac sodium 1 % GEL, Apply 2 g topically 2 times daily for 10 days  amitriptyline (ELAVIL) 10 MG tablet, Take 1 tablet by mouth nightly  lisinopril (PRINIVIL;ZESTRIL) 10 MG tablet, take 1 tablet by mouth once daily  Blood Pressure Monitor KIT, 1 kit by Does not apply route as needed (so pt may check her blood pressure when needed)  insulin glargine (LANTUS) 100 UNIT/ML injection vial, Inject 30 Units into the skin 2 times daily  insulin aspart (NOVOLOG) 100 UNIT/ML injection vial, Inject 25 Units into the skin 3 times daily (before meals) Indications: Inject 25 units plus HPWO sliding scale three times a day  OLANZapine (ZYPREXA) 5 MG tablet, Take 10 mg by mouth nightly   atorvastatin (LIPITOR) 40 MG tablet, Take 40 mg by mouth nightly   aspirin 81 MG tablet, Take 81 mg by mouth daily. gabapentin (NEURONTIN) 800 MG tablet, Take 800 mg by mouth 3 times daily. glimepiride (AMARYL) 4 MG tablet, Take 4 mg by mouth 2 times daily (with meals). metFORMIN (GLUCOPHAGE) 1000 MG tablet, Take 2,000 mg by mouth 2 times daily (with meals)   Diet    ADULT DIET;  Regular  Allergies    Codeine  Family History          Problem Relation Age of Onset    High Blood Pressure Mother     Heart Disease Mother     High Cholesterol Mother     Depression Mother     Stroke Mother     Diabetes Maternal Aunt     High Blood Pressure Maternal Aunt     High Cholesterol Maternal Aunt      Sleep History    Never diagnosed with sleep apnea in the past.  She is waiting for a split-night sleep study    Social History     Social History     Tobacco Use    Smoking status: Former Smoker     Packs/day: 1.00     Years: 15.00     Pack years: 15.00     Types: Cigarettes     Start date: 12/27/1999     Quit date: 2/27/2022    Smokeless tobacco: Never Used   Substance Use Topics    Alcohol use: No    Drug use: No     Vitals     height is 5' 1\" (1.549 m) and weight is 270 lb (122.5 kg). Her axillary temperature is 98.6 °F (37 °C). Her blood pressure is 162/88 (abnormal) and her pulse is 82. Her respiration is 20 and oxygen saturation is 96%. Body mass index is 51.02 kg/m². SUPPLEMENTAL O2: O2 Flow Rate (L/min): 60 L/min       I/O        Intake/Output Summary (Last 24 hours) at 3/1/2022 1148  Last data filed at 3/1/2022 0853  Gross per 24 hour   Intake 2503.28 ml   Output 1200 ml   Net 1303.28 ml     I/O last 3 completed shifts: In: 2483.3 [P.O.:800; I.V.:1591.6; IV Piggyback:91.7]  Out: 1200 [Urine:1200]   Patient Vitals for the past 96 hrs (Last 3 readings):   Weight   03/01/22 0330 270 lb (122.5 kg)   02/27/22 1041 270 lb (122.5 kg)       Exam   General Appearance: well built, well nourished in no acute distress on high flow. Obese BMI 51  HEENT: Normal, Head is normocephalic, atraumatic. Rest of the exam is limited by full face mask. PERRL  Neck - Supple, No JVD present. No tracheal deviation. Lungs - Bilateral air entry present. Bilateral breath sounds heard. Diminished breath sounds at bases right more than left side. Bilateral expiratory wheezes. No rales. Cardiovascular - Heart sounds are normal.  Regular rhythm normal rate without murmur, gallop or rub. Abdomen - Soft, obese, nontender, nondistended, no masses or organomegaly. Multiple bruises markings noted from her Lovenox injections  Neurologic - Awake, alert, oriented. There are no focal motor or sensory deficits  Extremities - No cyanosis, clubbing or edema. Musculoskeletal: Normal range of motion. Patient exhibits no tenderness. Lymphadenopathy:  No cervical adenopathy. Psychiatric: Patient  has a normal mood. Skin - No bruising or bleeding.     Labs  - Old records and notes have been reviewed in [facility]   ABG  Lab Results   Component Value Date    PH 7.37 02/28/2022    PO2 66 02/28/2022    PCO2 65 02/28/2022    HCO3 38 02/28/2022    O2SAT 91 02/28/2022     Lab Results   Component Value Date    IFIO2 60 02/28/2022     CBC  Recent Labs     02/27/22  1100 02/28/22  0501 03/01/22  0335   WBC 24.2* 22.0* 23.9*   RBC 5.32 5.13 4.87   HGB 16.1* 15.5 14.9   HCT 54.2* 51.8* 48.5*   .9* 101.0* 99.6*   MCH 30.3 30.2 30.6   MCHC 29.7* 29.9* 30.7*   * 390 365   MPV 9.3* 9.8 9.5      BMP  Recent Labs     02/27/22  1100 02/28/22  0501    136   K 5.0 5.0   CL 92* 92*   CO2 40* 38*   BUN 19 17   CREATININE 0.6 0.5   GLUCOSE 187* 176*   CALCIUM 9.0 8.9     LFT  No results for input(s): AST, ALT, ALB, BILITOT, ALKPHOS, LIPASE in the last 72 hours. Invalid input(s): AMYLASE  TROP  Lab Results   Component Value Date    TROPONINT < 0.010 02/27/2022    TROPONINT < 0.010 02/18/2022    TROPONINT < 0.010 08/08/2018     BNP  No results for input(s): BNP in the last 72 hours. Lactic Acid  Recent Labs     02/28/22  0501   LACTA 1.2     INR  No results for input(s): INR, PROTIME in the last 72 hours. PTT  No results for input(s): APTT in the last 72 hours. Glucose  Recent Labs     03/01/22  0556 03/01/22  0733 03/01/22  1050   POCGLU 184* 180* 130*     UA No results for input(s): SPECGRAV, PHUR, COLORU, CLARITYU, MUCUS, PROTEINU, BLOODU, RBCUA, WBCUA, BACTERIA, NITRU, GLUCOSEU, BILIRUBINUR, UROBILINOGEN, KETUA, LABCAST, LABCASTTY, AMORPHOS in the last 72 hours. Invalid input(s): CRYSTALS. PFTs   None in [facility]    Sleep studies   None in epic  Cultures    Influenza a and B performed on 27 February 2022-negative  COVID-19 test performed on 27 February 2022-none detected    EKG     Echocardiogram    Summary   Technically difficult examination.   Left ventricular size and systolic function is normal. Ejection fraction   was estimated at 55-60%.  LV wall thickness is within normal limits and   there are no obvious wall motion abnormalities.   The right ventricular size appears normal with normal systolic function   and wall thickness    Radiology    CXR  Chest x-ray performed on 27 February 2022:. Sun Feb 27, 2022 11:33   Narrative PROCEDURE: XR CHEST PORTABLE   CLINICAL INFORMATION: hypoxia, sob. COMPARISON: Chest x-ray 2/24/2022. Worsening bilateral patchy pulmonary opacities. There is a probable small right-sided pleural effusion. This could represent pulmonary edema versus infection. CT Scans  (See actual reports for details)   CT angiogram of chest performed on 18 February 2022:  Impression:  Bilateral pneumonia with significant reactive adenopathy. Follow-up recommended to ensure complete clearing of via chest radiography and subsequent CT examination across the next 6-8 weeks. Assessment   -Acute on chronic hypercapnic respiratory failure due to COPD exacerbation VS acute decompensated diastolic congestive heart failure VS obesity hypoventilation  -COPD exacerbation due to acute bronchitis VS right lower lobe pneumonia. Given patient history of recent hospitalization cannot exclude hospital-acquired pneumonia at this time.  -Right lower lobe pneumonia with a high suspicion for hospital-acquired pneumonia  -Chronic history of tobacco abuse. She is smoking for the last 22 years with 1 pack of cigarettes per day. Quit smoking 2 weeks back. -Morbid obesity with a high suspicion for obesity hypoventilation given patient's serum bicarb of 38 and a PCO2 of 76 mmHg on her arterial blood gas.    -Essential hypertension under control with medications  -Diabetes mellitus type 2  -Full Code     Plan   -Monitor SpO2 and wean supplemental O2 to maintain SpO2 >92%  -Duonebs ever 4 hours w/a  -Pulmicort neb BID  -Prednisone 40 mg PO daily x 5  -Follow pending cultures  -Continue current antibiotics ordered by primary service.  -Continue Guaifenesin 600 mg PO BID   -Pulmonary hygiene with IS and acapella   -Start patient on BiPAP with 16 x 6 cm of water.  -Obtain arterial blood gas 3 hours after a BiPAP set up  -Aspiration precautions  -Avoid all sedative medications if she is drowsy.  -Bed side Spirometry.  -Discussed to trial patient on O2 6LPM today - RT communication order placed  -New home O2 evaluation at NH   -DVT Prophylaxis: Lovenox per primary service  -Split night sleep study as outpatient with 8-10 week follow up in Sleep Clinic after PAP       Case discussed with Dr. Talib Bowen MD and [place] [address] educated about my impression and plan. She verbalizes understanding. Questions and concerns addressed. Meds and orders reviewed. Electronically signed by   ROBBIE Bolivar - CNP on 3/1/2022 at 11:48 AM     Vitals:    03/01/22 1245 03/01/22 1426 03/01/22 1559 03/01/22 1607   BP: 136/78  (!) 149/86    Pulse: 94  92    Resp: 18  19    Temp: 97.5 °F (36.4 °C)  98.4 °F (36.9 °C)    TempSrc: Axillary  Oral    SpO2: 94% 96% 96% 92%   Weight:       Height:         Neck: no JVD  Chest, decreased air movement, faint rales, no wheezes  Heart RRR  Bilateral LE edema  Awake, no distress  Transitioned from HFNC to low flow today  Comfortable on 5 lpm NC, SpO2 95%, asked RN to decrease flow. Will request RRT to proceed with home bipap eval if does not qualify, will schedule for PSG with split night study. Discussed with Bronwyn Forrester and RN    Patient seen and examined independently by me. Above discussed and I agree with  CNP note Also see my additional comments. Labs, cultures, and radiographs when available were reviewed. Changes were made in the orders as necessary. I discussed patient concerns with Althea's R.NEsau and instructions were given. Respiratory care issues addressed. Please see our orders for the updated patient care plan.     Electronically signed by     Sahil Kong MD on 3/1/2022 at 4:36 PM

## 2022-03-01 NOTE — PROGRESS NOTES
Pt now on FiO2 35% 60L. Breath sounds diminished with rhonchi upon inspiration and wheezing upon expiration. Pulse ox is 94%. Capillary refill less than three seconds.

## 2022-03-01 NOTE — PROGRESS NOTES
Patient alert and oriented x4, follows command and uses clear language. Pupils round and brisk reaction to light bilaterally. Hand  strong bilaterally. Dorsiflexion strong bilaterally. Plantar flexion strong bilaterally. Range of motion active in all extremities. Respiration pattern regular with normal depth, dyspnea at rest and with exertion. Rhonchi and wheezes heard in both lungs. Cardiac rhythm is regular, S1/S2, sinus rhythm. Bowel sounds active in all four quadrants. Generalized non-pitting edema in upper and lower extremities. Capillary refill is less than three seconds bilaterally. Pedal pulse +1, radial pulse +2, ulnar pulse +2. Skin warm, pink, flushed and diaphoretic and scattered bruising around lower abdomen.

## 2022-03-02 VITALS
SYSTOLIC BLOOD PRESSURE: 149 MMHG | HEART RATE: 95 BPM | TEMPERATURE: 97.9 F | WEIGHT: 270 LBS | HEIGHT: 61 IN | DIASTOLIC BLOOD PRESSURE: 101 MMHG | BODY MASS INDEX: 50.98 KG/M2 | RESPIRATION RATE: 18 BRPM | OXYGEN SATURATION: 96 %

## 2022-03-02 LAB
ANION GAP SERPL CALCULATED.3IONS-SCNC: 4 MEQ/L (ref 8–16)
BASOPHILS # BLD: 0.3 %
BASOPHILS ABSOLUTE: 0.1 THOU/MM3 (ref 0–0.1)
BUN BLDV-MCNC: 16 MG/DL (ref 7–22)
CALCIUM SERPL-MCNC: 8.7 MG/DL (ref 8.5–10.5)
CHLORIDE BLD-SCNC: 97 MEQ/L (ref 98–111)
CO2: 35 MEQ/L (ref 23–33)
CREAT SERPL-MCNC: 0.4 MG/DL (ref 0.4–1.2)
EOSINOPHIL # BLD: 0.1 %
EOSINOPHILS ABSOLUTE: 0 THOU/MM3 (ref 0–0.4)
ERYTHROCYTE [DISTWIDTH] IN BLOOD BY AUTOMATED COUNT: 13.3 % (ref 11.5–14.5)
ERYTHROCYTE [DISTWIDTH] IN BLOOD BY AUTOMATED COUNT: 48.8 FL (ref 35–45)
GFR SERPL CREATININE-BSD FRML MDRD: > 90 ML/MIN/1.73M2
GLUCOSE BLD-MCNC: 193 MG/DL (ref 70–108)
GLUCOSE BLD-MCNC: 274 MG/DL (ref 70–108)
GLUCOSE BLD-MCNC: 321 MG/DL (ref 70–108)
HCT VFR BLD CALC: 49.3 % (ref 37–47)
HEMOGLOBIN: 15.1 GM/DL (ref 12–16)
IMMATURE GRANS (ABS): 0.18 THOU/MM3 (ref 0–0.07)
IMMATURE GRANULOCYTES: 0.9 %
LYMPHOCYTES # BLD: 10.6 %
LYMPHOCYTES ABSOLUTE: 2.2 THOU/MM3 (ref 1–4.8)
MCH RBC QN AUTO: 30.4 PG (ref 26–33)
MCHC RBC AUTO-ENTMCNC: 30.6 GM/DL (ref 32.2–35.5)
MCV RBC AUTO: 99.2 FL (ref 81–99)
MONOCYTES # BLD: 6.1 %
MONOCYTES ABSOLUTE: 1.3 THOU/MM3 (ref 0.4–1.3)
NUCLEATED RED BLOOD CELLS: 0 /100 WBC
PLATELET # BLD: 368 THOU/MM3 (ref 130–400)
PMV BLD AUTO: 9.6 FL (ref 9.4–12.4)
POTASSIUM REFLEX MAGNESIUM: 4.7 MEQ/L (ref 3.5–5.2)
RBC # BLD: 4.97 MILL/MM3 (ref 4.2–5.4)
SEG NEUTROPHILS: 82 %
SEGMENTED NEUTROPHILS ABSOLUTE COUNT: 17 THOU/MM3 (ref 1.8–7.7)
SODIUM BLD-SCNC: 136 MEQ/L (ref 135–145)
WBC # BLD: 20.7 THOU/MM3 (ref 4.8–10.8)

## 2022-03-02 PROCEDURE — 2700000000 HC OXYGEN THERAPY PER DAY

## 2022-03-02 PROCEDURE — 94761 N-INVAS EAR/PLS OXIMETRY MLT: CPT

## 2022-03-02 PROCEDURE — 2580000003 HC RX 258: Performed by: STUDENT IN AN ORGANIZED HEALTH CARE EDUCATION/TRAINING PROGRAM

## 2022-03-02 PROCEDURE — 6370000000 HC RX 637 (ALT 250 FOR IP): Performed by: INTERNAL MEDICINE

## 2022-03-02 PROCEDURE — APPSS30 APP SPLIT SHARED TIME 16-30 MINUTES: Performed by: NURSE PRACTITIONER

## 2022-03-02 PROCEDURE — 6360000002 HC RX W HCPCS: Performed by: STUDENT IN AN ORGANIZED HEALTH CARE EDUCATION/TRAINING PROGRAM

## 2022-03-02 PROCEDURE — 94669 MECHANICAL CHEST WALL OSCILL: CPT

## 2022-03-02 PROCEDURE — 6360000002 HC RX W HCPCS: Performed by: NURSE PRACTITIONER

## 2022-03-02 PROCEDURE — 82948 REAGENT STRIP/BLOOD GLUCOSE: CPT

## 2022-03-02 PROCEDURE — 6370000000 HC RX 637 (ALT 250 FOR IP): Performed by: NURSE PRACTITIONER

## 2022-03-02 PROCEDURE — 94640 AIRWAY INHALATION TREATMENT: CPT

## 2022-03-02 PROCEDURE — 85025 COMPLETE CBC W/AUTO DIFF WBC: CPT

## 2022-03-02 PROCEDURE — 6370000000 HC RX 637 (ALT 250 FOR IP): Performed by: STUDENT IN AN ORGANIZED HEALTH CARE EDUCATION/TRAINING PROGRAM

## 2022-03-02 PROCEDURE — 80048 BASIC METABOLIC PNL TOTAL CA: CPT

## 2022-03-02 PROCEDURE — 2580000003 HC RX 258: Performed by: INTERNAL MEDICINE

## 2022-03-02 RX ADMIN — GABAPENTIN 800 MG: 400 CAPSULE ORAL at 08:53

## 2022-03-02 RX ADMIN — ENOXAPARIN SODIUM 40 MG: 100 INJECTION SUBCUTANEOUS at 08:52

## 2022-03-02 RX ADMIN — HYDRALAZINE HYDROCHLORIDE 50 MG: 50 TABLET, FILM COATED ORAL at 06:18

## 2022-03-02 RX ADMIN — SODIUM CHLORIDE: 9 INJECTION, SOLUTION INTRAVENOUS at 06:18

## 2022-03-02 RX ADMIN — IPRATROPIUM BROMIDE AND ALBUTEROL SULFATE 1 AMPULE: .5; 3 SOLUTION RESPIRATORY (INHALATION) at 12:32

## 2022-03-02 RX ADMIN — BUDESONIDE 500 MCG: 0.5 INHALANT RESPIRATORY (INHALATION) at 07:57

## 2022-03-02 RX ADMIN — IPRATROPIUM BROMIDE AND ALBUTEROL SULFATE 1 AMPULE: .5; 3 SOLUTION RESPIRATORY (INHALATION) at 07:57

## 2022-03-02 RX ADMIN — PREDNISONE 40 MG: 20 TABLET ORAL at 08:53

## 2022-03-02 RX ADMIN — HYDRALAZINE HYDROCHLORIDE 50 MG: 50 TABLET, FILM COATED ORAL at 14:33

## 2022-03-02 RX ADMIN — INSULIN LISPRO 3 UNITS: 100 INJECTION, SOLUTION INTRAVENOUS; SUBCUTANEOUS at 08:52

## 2022-03-02 RX ADMIN — METOPROLOL TARTRATE 100 MG: 100 TABLET, FILM COATED ORAL at 08:53

## 2022-03-02 RX ADMIN — INSULIN LISPRO 1 UNITS: 100 INJECTION, SOLUTION INTRAVENOUS; SUBCUTANEOUS at 11:18

## 2022-03-02 RX ADMIN — GUAIFENESIN 600 MG: 600 TABLET, EXTENDED RELEASE ORAL at 08:53

## 2022-03-02 RX ADMIN — LISINOPRIL 10 MG: 10 TABLET ORAL at 08:53

## 2022-03-02 RX ADMIN — DILTIAZEM HYDROCHLORIDE 30 MG: 30 TABLET, FILM COATED ORAL at 14:33

## 2022-03-02 RX ADMIN — GABAPENTIN 800 MG: 400 CAPSULE ORAL at 14:33

## 2022-03-02 RX ADMIN — SODIUM CHLORIDE, PRESERVATIVE FREE 10 ML: 5 INJECTION INTRAVENOUS at 08:39

## 2022-03-02 RX ADMIN — ASPIRIN 81 MG: 81 TABLET, COATED ORAL at 08:53

## 2022-03-02 RX ADMIN — DILTIAZEM HYDROCHLORIDE 30 MG: 30 TABLET, FILM COATED ORAL at 08:53

## 2022-03-02 RX ADMIN — METFORMIN HYDROCHLORIDE 2000 MG: 500 TABLET ORAL at 08:53

## 2022-03-02 NOTE — CARE COORDINATION
3/2/22, 3:15 PM EST    Patient goals/plan/ treatment preferences discussed by  and . Patient goals/plan/ treatment preferences reviewed with patient/ family. Patient/ family verbalize understanding of discharge plan and are in agreement with goal/plan/treatment preferences. Understanding was demonstrated using the teach back method. AVS provided by RN at time of discharge, which includes all necessary medical information pertaining to the patients current course of illness, treatment, post-discharge goals of care, and treatment preferences. Services After Discharge  Services At/After Discharge: Nursing Services,OT,PT,Aide Services ([address] [place] [address])         Home with [facility] for RN, PT, OT and aid. Made Janett at [facility] aware of discharge. She did not have a ride home and she does not have any money to pay for a cab. Set up a [facility] for her to return home. She asked for a ride to her sleep study tomorrow night. She told ELINA that she does not have anyone to take her. She told ELINA yesterday that her step dad transports her, she denies that today. Told her she needs to find that ride on her own. She refused to leave and told ELINA that if she leaves and we don't set up a ride for tomorrow night she will come right back. ELINA set up a [facility] to and from her sleep study. Made her aware transport has been set up.

## 2022-03-02 NOTE — PROGRESS NOTES
CLINICAL PHARMACY: DISCHARGE MED RECONCILIATION/REVIEW    [facility]) Select Patient?: No  Total # of Interventions Recommended: 0   -   Total # Interventions Accepted: 0  Intervention Severity:   - Level 1 Intervention Present?: No   - Level 2 #: 0   - Level 3 #: 0   Time Spent (min): 15    Additional Documentation:    Sen Scott, PharmD  3:07 PM  3/2/2022

## 2022-03-02 NOTE — PROGRESS NOTES
Discharge teaching and instructions for diagnosis/procedure of COPD exacerbation completed with patient using teachback method. AVS reviewed. Printed prescriptions given to patient. Patient voiced understanding regarding prescriptions, follow up appointments, and care of self at home. Discharged ambulatory and in a wheelchair to  home with support per family.

## 2022-03-02 NOTE — PROGRESS NOTES
A home oxygen evaluation has been completed. []Patient is an inpatient. It is expected that the patient will be discharged within the next 48 hours. Qualified provider to write order for home prescription if patient qualifies. Social service/care managers will arrange for home oxygen. If patient is active, arrange for Home Medical supplier to assess for Oxygen Conserving Device per pulse oximetry. []Patient is an outpatient. Results will be faxed to the ordering provider. Qualified provider to write order for home prescription if patient qualifies and arranges for home oxygen. Patient was placed on room air for 5 minutes. SpO2 was 85 % on room air at rest. Patients SpO2 was below 89% and qualified for home oxygen. Oxygen was applied at 4 lpm via nasal cannula to maintain a SpO2 between 90-92% while at rest. Actual SpO2 was 92 %. Patient can ambulate for exercise flow rate. Patients was ambulated, SpO2 was 92% on 4 lpm to maintain SpO2 between 90-92% while exercising. .     Note: For any SpO2 at 72% see policy and procedure for possible qualifications.

## 2022-03-02 NOTE — PROGRESS NOTES
[facility]    Patient - Geraldine Madsen   MRN -  [id number]   [facility]   - 1980      Date of Admission -  2022 10:41 AM  Date of evaluation -  3/2/2022  [facility] - -10/010-   Power Almonte MD Primary Care Physician - ROBBIE Metcalf - DARRYL     Problem List      Active Hospital Problems    Diagnosis Date Noted    Acute respiratory failure with hypoxia [facility]) [J96.01] 2022     Reason for Consult    For management of respiratory distress. She is on high flow  HPI   History Obtained From: Patient and electronic medical record. Geraldine Madsen is a 37 y.o. female  was initially admitted under hospitalist service. Pulmonary medicine was consulted for further management of respiratory distress and high flow    She  is a 41 y.o. female with a past medical history of chronic tobacco smoking for 22 years with 1 pack of cigarettes per day. Quit smoking 2 weeks back was seen by Dr. Andrew Miller MD during her previous hospitalization. She had a PMH of hypertension, diabetes mellitus type 2 and ?COPD. She was recently admitted and discharged from [address] [place] [address]. During her previous hospitalization she was treated for a COPD exacerbation along with hypercapnic respiratory failure. She was scheduled for outpatient split-night sleep study to evaluate for sleep apnea and treat with the PAP device. She was discharged from [facility] on 2022. She came back to emergency room at [facility] on 2022 with worsening of shortness of breath and hypoxia. On further questioning patient told me that after going home she started noticing worsening of cough with yellow sputum production. She also noted worsening of shortness of breath.  During her previous hospitalization she was treated with Rocephin and Zithromax along with a tapering dose of steroids. She was discharged home with 4 L of oxygen on nasal cannula. Patient's son called 911 due to development of tachypnea and hypoxia. Patient was placed on high flow oxygen. She was also placed on Decadron 6 mg IV daily. Pulmonary medicine was called for further evaluation.     She is currently working at [employer] in [facility]. She denied any significant exposure to aerosolized particles or hazardous fumes. 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 She initially presented to [place], [place] with worsening of shortness of breath. Onset: gradual   Duration: 1 day after discharge from the hospital  Diurnal variation: None  Current functional capacity on level ground: Very limited on level ground. She admits to orthopnea. She admits to paroxysmal nocturnal dyspnea. She is having cough: Yes  Duration of cough: for few days. Her cough is associated with sputum production: Yes    The sputum color: yellow  Hemoptysis: No.  Diurnal variation: No    She is having chest pain: No    She gives a history of fever: No  Chills & rigors: No  Associated night sweats: No.        Sleep medicine HPI:    Sleep apnea symptoms:  Noticed to have loud snoring:Yes. Witnessed apneas during sleep noticed: Yes. History of choking and gasping sensation at night time: Yes. History of headaches in the morning:Yes. History of dry mouth in the morning: Yes. History of palpitations during night time/nocturnal awakenings: Yes. History of sweating during night time/nocturnal awakenings:  Yes     General:  History of head injury in the past: No.   Associated loss of consciousness with head injury: No.  History of seizures: No.   Rest less legs syndrome symptoms:NO  History suggestive of periodic limb movements during sleep: NO  History suggestive of hypnagogic hallucinations: NO  History suggestive of hypnopompic hallucinations: NO  History suggestive of sleep talking:NO  History suggestive of sleep walking:NO  History suggestive of bruxism: No.  History suggestive of cataplexy: NO  History suggestive of sleep paralysis: NO     History regarding old sleep studies:  Prior history of sleep study: No.  Using CPAP device: No.  Currently using home Oxygen: NO.        Patient considerations:  Is the patient is ambulatory: Yes  Patient is currently using: None of these Wheelchair, [employer] Fling or [employer]. Para/Quadriplegic: NO  Hearing deficit : NO  Claustrophobic: NO  MDD : NO  Blind: NO  Incontinent: NO  Para/Quadraplegi: NO.   Need transportation to and from Sleep Center:NO     Macon Sleepiness Score:   Sitting and readin  Watching TV:3  Sitting inactive in a public place:3  Being a passenger in a motor vehicle for an hour or more:3  Lying down in the afternoon:3  Sitting and talking to someone:1  Sitting quietly after lunch (no alcohol):1  Stopped for a few minutes in traffic while drivin  Total UUPCQ:40     Mallampati Score: 4   Neck Circumference:20 Inches. Past 24 Hours   -No BiPAP use seen overnight  -SOB improved   -Afebrile  -Stable on O2 4LPM- 93%  -Home today    -All systems reviewed.      PMHx   Past Medical History      Diagnosis Date    Anxiety     Depression     Diabetes mellitus (HCC)     Hyperlipidemia     Hypertension       Past Surgical History        Procedure Laterality Date    ABSCESS DRAINAGE       Meds    Current Medications    predniSONE  40 mg Oral Daily    budesonide  0.5 mg Nebulization BID    sodium chloride flush  5-40 mL IntraVENous 2 times per day    ipratropium-albuterol  1 ampule Inhalation Q4H WA    guaiFENesin  600 mg Oral BID    enoxaparin  40 mg SubCUTAneous Daily    amitriptyline  10 mg Oral Nightly    aspirin  81 mg Oral Daily    atorvastatin  40 mg Oral Nightly    dilTIAZem  30 mg Oral TID    gabapentin  800 mg Oral TID    hydrALAZINE  50 mg Oral 3 times per day    lisinopril  10 mg Oral Daily    metoprolol  100 mg Oral BID    OLANZapine  10 mg Oral Nightly    nicotine  1 patch TransDERmal Daily    metFORMIN  2,000 mg Oral BID     insulin glargine  50 Units SubCUTAneous Nightly    insulin lispro  0-6 Units SubCUTAneous TID     insulin lispro  0-3 Units SubCUTAneous Nightly     sodium chloride flush, sodium chloride, polyethylene glycol, acetaminophen **OR** acetaminophen, ondansetron, glucose, glucagon (rDNA), dextrose, dextrose bolus (hypoglycemia) **OR** dextrose bolus (hypoglycemia)  IV Drips/Infusions   sodium chloride      sodium chloride 100 mL/hr at 03/02/22 0618    dextrose       Home Medications  Medications Prior to Admission: budesonide (PULMICORT) 0.5 MG/2ML nebulizer suspension, Take 2 mLs by nebulization 2 times daily  tiotropium-olodaterol (STIOLTO) 2.5-2.5 MCG/ACT AERS, Inhale 2 puffs into the lungs daily  hydrALAZINE (APRESOLINE) 50 MG tablet, Take 1 tablet by mouth every 8 hours  predniSONE (DELTASONE) 10 MG tablet, Take 4 tablets by mouth daily for 3 days, THEN 3 tablets daily for 3 days, THEN 2 tablets daily for 3 days, THEN 1 tablet daily for 3 days. nicotine (NICODERM CQ) 14 MG/24HR, Place 1 patch onto the skin daily  albuterol (PROVENTIL) (2.5 MG/3ML) 0.083% nebulizer solution, Take 3 mLs by nebulization every 4 hours as needed for Wheezing  albuterol (PROVENTIL) (2.5 MG/3ML) 0.083% nebulizer solution, Take 3 mLs by nebulization 2 times daily  pregabalin (LYRICA) 100 MG capsule, Take 200 mg by mouth 2 times daily.   ibuprofen (ADVIL;MOTRIN) 600 MG tablet, Take 1 tablet by mouth 3 times daily as needed for Pain  lidocaine (LIDODERM) 5 %, Place 1 patch onto the skin daily 12 hours on, 12 hours off.  ondansetron (ZOFRAN ODT) 4 MG disintegrating tablet, Take 1 tablet by mouth every 8 hours as needed for Nausea or Vomiting  Lactobacillus (PROBIOTIC ACIDOPHILUS) CAPS, Take 1 capsule by mouth Cigarettes     Start date: 12/27/1999     Quit date: 2/27/2022    Smokeless tobacco: Never Used   Substance Use Topics    Alcohol use: No    Drug use: No     Vitals     height is 5' 1\" (1.549 m) and weight is 270 lb (122.5 kg). Her oral temperature is 97.7 °F (36.5 °C). Her blood pressure is 154/96 (abnormal) and her pulse is 102. Her respiration is 18 and oxygen saturation is 93%. Body mass index is 51.02 kg/m². SUPPLEMENTAL O2: O2 Flow Rate (L/min): 4 L/min       I/O        Intake/Output Summary (Last 24 hours) at 3/2/2022 0828  Last data filed at 3/2/2022 0347  Gross per 24 hour   Intake 420 ml   Output 850 ml   Net -430 ml     I/O last 3 completed shifts: In: 2103.3 [P.O.:420; I.V.:1591.6; IV Piggyback:91.7]  Out: 1200 [Urine:1200]   Patient Vitals for the past 96 hrs (Last 3 readings):   Weight   03/01/22 0330 270 lb (122.5 kg)   02/27/22 1041 270 lb (122.5 kg)       Exam   General Appearance: well built, well nourished in no acute distress on O2 4LPM/   HEENT: Normal, Head is normocephalic, atraumatic. Rest of the exam is limited by full face mask. PERRL  Neck - Supple, No JVD present. No tracheal deviation. Lungs - Bilateral air entry present. Bilateral breath sounds heard. Diminished breath sounds at bases right more than left side. Bilateral expiratory wheezes. No rales. Cardiovascular - Heart sounds are normal.  Regular rhythm normal rate without murmur, gallop or rub. Slight edema BLE  Abdomen - Soft, obese, nontender, nondistended, no masses or organomegaly. Multiple bruises markings noted from her Lovenox injections  Neurologic - Awake, alert, oriented. There are no focal motor or sensory deficits  Extremities - No cyanosis, clubbing or edema. Musculoskeletal: Normal range of motion. Patient exhibits no tenderness. Lymphadenopathy:  No cervical adenopathy. Psychiatric: Patient  has a normal mood. Skin - No bruising or bleeding.     Labs  - Old records and notes have been reviewed in [facility]   ABG  Lab Results   Component Value Date    PH 7.37 02/28/2022    PO2 66 02/28/2022    PCO2 65 02/28/2022    HCO3 38 02/28/2022    O2SAT 91 02/28/2022     Lab Results   Component Value Date    IFIO2 60 02/28/2022     CBC  Recent Labs     02/28/22  0501 03/01/22  0335 03/02/22  0545   WBC 22.0* 23.9* 20.7*   RBC 5.13 4.87 4.97   HGB 15.5 14.9 15.1   HCT 51.8* 48.5* 49.3*   .0* 99.6* 99.2*   MCH 30.2 30.6 30.4   MCHC 29.9* 30.7* 30.6*    365 368   MPV 9.8 9.5 9.6      BMP  Recent Labs     02/27/22  1100 02/28/22  0501 03/02/22  0545    136 136   K 5.0 5.0 4.7   CL 92* 92* 97*   CO2 40* 38* 35*   BUN 19 17 16   CREATININE 0.6 0.5 0.4   GLUCOSE 187* 176* 321*   CALCIUM 9.0 8.9 8.7     LFT  No results for input(s): AST, ALT, ALB, BILITOT, ALKPHOS, LIPASE in the last 72 hours. Invalid input(s): AMYLASE  TROP  Lab Results   Component Value Date    TROPONINT < 0.010 02/27/2022    TROPONINT < 0.010 02/18/2022    TROPONINT < 0.010 08/08/2018     BNP  No results for input(s): BNP in the last 72 hours. Lactic Acid  Recent Labs     02/28/22  0501   LACTA 1.2     INR  No results for input(s): INR, PROTIME in the last 72 hours. PTT  No results for input(s): APTT in the last 72 hours. Glucose  Recent Labs     03/01/22  1448 03/01/22  1946 03/02/22  0642   POCGLU 133* 293* 274*     UA No results for input(s): SPECGRAV, PHUR, COLORU, CLARITYU, MUCUS, PROTEINU, BLOODU, RBCUA, WBCUA, BACTERIA, NITRU, GLUCOSEU, BILIRUBINUR, UROBILINOGEN, KETUA, LABCAST, LABCASTTY, AMORPHOS in the last 72 hours. Invalid input(s): CRYSTALS. PFTs   None in Psychiatric    Sleep studies   None in epic  Cultures    Influenza a and B performed on 27 February 2022-negative  COVID-19 test performed on 27 February 2022-none detected    EKG     Echocardiogram    Summary   Technically difficult examination.   Left ventricular size and systolic function is normal. Ejection fraction   was estimated at 55-60%.  LV wall thickness is within normal limits and   there are no obvious wall motion abnormalities.   The right ventricular size appears normal with normal systolic function   and wall thickness    Radiology    CXR  Chest x-ray performed on 27 February 2022:Esau Sun Feb 27, 2022 11:33   Narrative PROCEDURE: XR CHEST PORTABLE   CLINICAL INFORMATION: hypoxia, sob. COMPARISON: Chest x-ray 2/24/2022. Worsening bilateral patchy pulmonary opacities. There is a probable small right-sided pleural effusion. This could represent pulmonary edema versus infection. CT Scans  (See actual reports for details)   CT angiogram of chest performed on 18 February 2022:  Impression:  Bilateral pneumonia with significant reactive adenopathy. Follow-up recommended to ensure complete clearing of via chest radiography and subsequent CT examination across the next 6-8 weeks. Assessment   -Acute on chronic hypercapnic respiratory failure due to COPD exacerbation VS acute decompensated diastolic congestive heart failure VS obesity hypoventilation  -COPD exacerbation due to acute bronchitis VS right lower lobe pneumonia. Given patient history of recent hospitalization cannot exclude hospital-acquired pneumonia at this time.  -Right lower lobe pneumonia with a high suspicion for hospital-acquired pneumonia  -Chronic history of tobacco abuse. She is smoking for the last 22 years with 1 pack of cigarettes per day. Quit smoking 2 weeks back. -Morbid obesity with a high suspicion for obesity hypoventilation given patient's serum bicarb of 38 and a PCO2 of 76 mmHg on her arterial blood gas.    -Essential hypertension under control with medications  -Diabetes mellitus type 2  -Full Code     Plan   -Monitor SpO2 and wean supplemental O2 to maintain SpO2 >92%  -Duonebs ever 4 hours w/a  -Pulmicort neb BID  -Prednisone 40 mg PO daily x 5  -Continue Guaifenesin 600 mg PO BID   -Pulmonary hygiene with IS and acapella   -BiPAP with 16 x 6 cm of water.  -Aspiration precautions  -Avoid all sedative medications if she is drowsy.  -Weight loss encouraged  -Positional sleep therapy discussed until she can get set up with PAP therapy   -DVT Prophylaxis: Lovenox per primary service    -Will schedule  for nocturnal polysomnogram (Sleep study) with split night protocol at [facility] sleep lab after discharge from the current hospitalization. Patient to follow with Ms. Julián Owens CNP/ ( Dr. Dudley MD)'s sleep clinic at [facility] for Pulmonary disease in 8-10 week after the sleep study to go over the study reports and further management options.  -Split night 3/3/22 at 8:00 PM  -Follow up appt. For split night study and download review- 5/11/22 @ 3:00 PM  -PFT in 3 months with follow up at Pulmonary Clinic after- testing ordered in Epic      Case discussed with Dr. Ritu Genao MD and Zion Kim educated about my impression and plan. She verbalizes understanding. Questions and concerns addressed. Meds and orders reviewed.      Electronically signed by ROBBIE Hummel CNP on 3/2/2022 at 8:56 AM

## 2022-03-02 NOTE — DISCHARGE SUMMARY
Hospital Medicine Discharge Summary      Patient Identification:   Milly Muniz   : 1980  MRN: [id number]   Account: [de-identified]      Patient's PCP: ROBBIE Salazar - CNP    Admit Date: 2022     Discharge Date: 3/2/2022      Admitting Physician: Mehnaz Schmid DO     Discharge Physician: Prabhu Beltran DO     Discharge Diagnoses:    1. Acute on subacute hypoxic, chronic hypercapnic respiratory failure - likely secondary to underlying OHS/JACIEL. Recently discharged 22 for AHRF 2/2 suspected COPD vs PNA s/p antibiotics and on 4 L NC. Presented worsening S OB with SPO2 78%. Escalated to HFNC. CXR significant for bilateral patchy pulmonary opacities, probable small right pleural effusion. Given vancomycin/cefepime, duonebs, and Decadron 6 mg. Started on IV fluids. Initial ABGs pH 7.35, PCO2 76, PO2 58, HCO3 42 - indicative of chronic compensated respiratory acidosis with metabolic alkalosis. Pulmonology consulted. - Weaned down to subacute baseline of 4 L NC. Home O2 eval resulted in need for 4 L NC.      - Split-night sleep study tomorrow following discharge. Follow up in 8-10-week pulmonology for sleep study and PAP .     2. HTN - continue home antihypertensives      3. HLD - on home Lipitor 40 mg p.o. daily      4. IDDMII - last HgbA1c 5.6%. Resume home antidiabetic therapy.     5.  Leukocytosis - likely reactive to recent steroid use. Afebrile, no active signs of infection. Zosyn discontinued     6. Anxiety/Depression - continue olanzapine 10 mg p.o. nightly and amitriptyline 10 mg p.o. nightly     7. Peripheral Neuropathy - continue gabapentin 8 mg p.o. 3 times daily    The patient was seen and examined on day of discharge and this discharge summary is in conjunction with any daily progress note from day of discharge. Hospital Course:    The patient is a 71-year-old female with a past medical history of HTN, IDDM 2, tobacco abuse, anxiety/depression, and morbid obesity who presented to [address] [place] [address] ED for worsening shortness of breath. Per report patients son called 911 due to patient not\" breathing adequately\". Pulmonology had been following since prior admission, see below.       Of note, the patient was recently discharged from hospital 2/25/2022 for acute hypoxic respiratory failure secondary to COPD secondary to pneumonia. At the time she was treated with Rocephin and azithromycin along with steroid taper (transition from Solu-Medrol to prednisone p.o. taper) and discharged on 4 L NC.     In ED afebrile, , RR 26 with SPO2 78% on 6 L NC and escalated to HFNC. Labs significant for WBC 24.2. Pro-Shashi 0.07.  proBNP 422.1. Troponin negative x1. Influenza a/B and COVID-19 negative. CXR demonstrated bilateral patchy pulmonary opacities, probable small right pleural effusion. She was started on vancomycin/cefepime, duo nebs and Decadron 6 mg. IVF was started for fluid resuscitation. ABG significant for pH 7.35, PCO2 76, PO2 58, HCO3 42. Patient admitted for further evaluation management. Pulmonary was consulted. The patient was started on duo nebs, Pulmicort nebulized and prednisone. Hygiene was encouraged. BiPAP was attempted and patient did tolerate for a bit. The patient was effectively weaned down to 4 L NC. Home O2 eval concluded need for 4 L NC. Per pulmonology the patient will require a sleep study with split night protocol at [facility] sleep lab tomorrow following discharge. She is also to follow up with pulmonology in 8-10 weeks 25 sleep study and continue with PAP therapy. The patient is medically stable for discharge.       Exam:     Vitals:  Vitals:    03/02/22 0800 03/02/22 0812 03/02/22 1209 03/02/22 1233   BP:  (!) 154/96 (!) 149/101    Pulse:  102 95    Resp: 20 18 18 18   Temp:  97.7 °F (36.5 °C) 97.9 °F (36.6 °C)    TempSrc:  Oral Oral    SpO2: 96% 93% 98% 96%   Weight:       Height:         Weight: Weight: 270 lb (122.5 kg)     24 hour intake/output:    Intake/Output Summary (Last 24 hours) at 3/2/2022 1749  Last data filed at 3/2/2022 1209  Gross per 24 hour   Intake 500 ml   Output 850 ml   Net -350 ml         General appearance: No apparent distress, appears stated age and cooperative. Morbid obesity  HEENT: Pupils equal, round, and reactive to light. Conjunctivae/corneas clear. Neck: Supple, with full range of motion. No jugular venous distention. Trachea midline. Respiratory: Decreased respiratory effort. Diminished breath sounds appreciated bilaterally with mild wheezes noted. Cardiovascular: Regular rate and rhythm with normal S1/S2 without murmurs, rubs or gallops. Abdomen: Soft, non-tender, non-distended with normal bowel sounds. Musculoskeletal: passive and active ROM x 4 extremities. Skin: Skin color, texture, turgor normal.  No rashes or lesions. Neurologic:  Neurovascularly intact without any focal sensory/motor deficits. Cranial nerves: II-XII intact, grossly non-focal.  Psychiatric: Alert and oriented, thought content appropriate, normal insight  Capillary Refill: Brisk,< 3 seconds   Peripheral Pulses: +2 palpable, equal bilaterally       Labs: For convenience and continuity at follow-up the following most recent labs are provided:      CBC:    Lab Results   Component Value Date    WBC 20.7 03/02/2022    HGB 15.1 03/02/2022    HCT 49.3 03/02/2022     03/02/2022       Renal:    Lab Results   Component Value Date     03/02/2022    K 4.7 03/02/2022    CL 97 03/02/2022    CO2 35 03/02/2022    BUN 16 03/02/2022    CREATININE 0.4 03/02/2022    CALCIUM 8.7 03/02/2022    PHOS 2.8 07/11/2014         Significant Diagnostic Studies    Radiology:   XR CHEST PORTABLE   Final Result   Worsening bilateral patchy pulmonary opacities. There is a probable small right-sided pleural effusion. This could represent pulmonary edema versus infection. **This report has been created using voice recognition software. It may contain minor errors which are inherent in voice recognition technology. **      Final report electronically signed by Dr. Joann Le on 2/27/2022 11:33 AM             Consults:     Amrita 83 TO PULMONOLOGY  IP CONSULT TO SOCIAL WORK    Disposition: Home  Condition at Discharge: Stable    Code Status:  Prior     Patient Instructions: Activity: activity as tolerated  Diet: No diet orders on file      Follow-up visits:   [address]/[facility]  [facility]        ROBBIE Kimble - CNP  [address] [facility]    In 1 week  office will call you to schedule.  if you do not hear from them by tomorrow, call to schedule    Charmayne Bogaert, APRN - CNP  69 [facility] [address]  [phone]    On 5/11/2022  at 3:00 PM with DL review     ROBBIE Kimble - CNP  [address]  [phone]    On 3/2/2022  office call patient phone     [id number] [address] Cristhian Najera  On 3/3/2022  arrive at [address] [place] [address], have to be there by 8PM         Discharge Medications:        Medication List      CONTINUE taking these medications    * albuterol (2.5 MG/3ML) 0.083% nebulizer solution  Commonly known as: PROVENTIL  Take 3 mLs by nebulization every 4 hours as needed for Wheezing     * albuterol (2.5 MG/3ML) 0.083% nebulizer solution  Commonly known as: PROVENTIL  Take 3 mLs by nebulization 2 times daily     amitriptyline 10 MG tablet  Commonly known as: ELAVIL  Take 1 tablet by mouth nightly     aspirin 81 MG tablet     atorvastatin 40 MG tablet  Commonly known as: LIPITOR     Blood Pressure Monitor Kit  1 kit by Does not apply route as needed (so pt may check her blood pressure when needed)     budesonide 0.5 MG/2ML nebulizer suspension  Commonly known as: PULMICORT  Take 2 mLs by nebulization 2 times daily diclofenac sodium 1 % Gel  Commonly known as: VOLTAREN  Apply 2 g topically 2 times daily for 10 days     dilTIAZem 30 MG tablet  Commonly known as: CARDIZEM  Take 1 tablet by mouth 3 times daily     glimepiride 4 MG tablet  Commonly known as: AMARYL     hydrALAZINE 50 MG tablet  Commonly known as: APRESOLINE  Take 1 tablet by mouth every 8 hours     ibuprofen 600 MG tablet  Commonly known as: ADVIL;MOTRIN  Take 1 tablet by mouth 3 times daily as needed for Pain     insulin glargine 100 UNIT/ML injection vial  Commonly known as: LANTUS  Inject 30 Units into the skin 2 times daily     ivabradine 5 MG Tabs tablet  Commonly known as: CORLANOR  Take 1 tablet by mouth 2 times daily (with meals)     lidocaine 5 %  Commonly known as: Lidoderm  Place 1 patch onto the skin daily 12 hours on, 12 hours off.     lisinopril 10 MG tablet  Commonly known as: PRINIVIL;ZESTRIL  take 1 tablet by mouth once daily     metFORMIN 1000 MG tablet  Commonly known as: GLUCOPHAGE     metoprolol 100 MG tablet  Commonly known as: LOPRESSOR  Take 1 tablet by mouth 2 times daily Hold sbp less 110 HR less than 55     Neurontin 800 MG tablet  Generic drug: gabapentin     nicotine 14 MG/24HR  Commonly known as: NICODERM CQ  Place 1 patch onto the skin daily     NovoLOG 100 UNIT/ML injection vial  Generic drug: insulin aspart     OLANZapine 5 MG tablet  Commonly known as: ZYPREXA     ondansetron 4 MG disintegrating tablet  Commonly known as: Zofran ODT  Take 1 tablet by mouth every 8 hours as needed for Nausea or Vomiting     predniSONE 10 MG tablet  Commonly known as: DELTASONE  Take 4 tablets by mouth daily for 3 days, THEN 3 tablets daily for 3 days, THEN 2 tablets daily for 3 days, THEN 1 tablet daily for 3 days.   Start taking on: February 26, 2022     pregabalin 100 MG capsule  Commonly known as: LYRICA     Probiotic Acidophilus Caps  Take 1 capsule by mouth daily     tiotropium-olodaterol 2.5-2.5 MCG/ACT Aers  Commonly known as: STIOLTO  Inhale 2 puffs into the lungs daily         * This list has 2 medication(s) that are the same as other medications prescribed for you. Read the directions carefully, and ask your doctor or other care provider to review them with you. Time Spent on discharge is more than 45 minutes in the examination, evaluation, counseling and review of medications and discharge plan. Discharge Medications for PCI/MI (performed or attempted):   ASA:   Aspirin 81 mg daily    Statin:   Atorvastatin 40 mg nightly  ACE/ARB:  Lisinopril 10 mg p.o. daily   Beta Blocker:    mg p.o. q. twice daily            Signed: Thank you ROBBIE Dailey CNP for the opportunity to be involved in this patient's care.     Electronically signed by Artem Kimball DO on 3/2/2022 at 5:49 PM

## 2022-03-03 ENCOUNTER — HOSPITAL ENCOUNTER (OUTPATIENT)
Dept: SLEEP CENTER | Age: 42
Discharge: HOME | End: 2022-03-05
Payer: COMMERCIAL

## 2022-03-03 DIAGNOSIS — J44.9 OSA AND COPD OVERLAP SYNDROME (HCC): ICD-10-CM

## 2022-03-03 DIAGNOSIS — J96.02 ACUTE RESPIRATORY FAILURE WITH HYPOXIA AND HYPERCAPNIA (HCC): ICD-10-CM

## 2022-03-03 DIAGNOSIS — G47.33 OSA AND COPD OVERLAP SYNDROME (HCC): ICD-10-CM

## 2022-03-03 DIAGNOSIS — J96.01 ACUTE RESPIRATORY FAILURE WITH HYPOXIA AND HYPERCAPNIA (HCC): ICD-10-CM

## 2022-03-03 DIAGNOSIS — E66.01 MORBID OBESITY DUE TO EXCESS CALORIES (HCC): ICD-10-CM

## 2022-03-03 PROCEDURE — 95811 POLYSOM 6/>YRS CPAP 4/> PARM: CPT

## 2022-03-04 LAB — STATUS: NORMAL

## 2022-03-04 NOTE — PROGRESS NOTES
Title:  CPAP/BiLevel Titration's    Approved by:  Humaira Quinn MD      Approval Date:  December, 2019 Next Review:  December, 2021     Responsible Party:  Manny Garcia Institution/Entities Applies to:   Kayy Hernandez Number:  None    Document Type:  Such as Guideline, Policy, Policy & Procedure, or Procedure, Instructions  Manual:  Policy and Procedures    Section: IV Policy Start Date: October, 6149           POLICY: Positive airway pressure device is used to treat patients with sleep related breathing disorders characterized by full or partial occlusion of the upper airway during sleep. A patient must have undergone polysomnography and diagnosed with obstructive sleep apnea. All individuals who record sleep studies must follow best practices for CPAP/bilevel/ASV titrations in order to attain the ideal pressure setting for their patients. Too low of pressure may cause patients to either be sub-optimally treated or to wake up in a panic. Too much pressure may cause the patient to experience bloating or mask leakage. Determining the appropriate pressure setting for each patient will lead to improved adherence and outcome. Titrations are not an exact science, and it is understood that technologists may need to make minor changes for individual patients. The procedures outlined below are meant to be a guideline and follow the spirit of the AASM clinical guidelines. PROCEDURE:    CPAP:    1. Review the patients pertinent medical al history, previous sleep study or studies to ass the severity of sleep disordered breathing. Review of pertinent information will help to attain a better titration. 2. Applications of electrodes, montages, filters, sensitivities and scoring will be performed according to the current version of the AASM Scoring Manual.   3. Prior to initiating study collect all appropriate PAP supplies  a. Tubing   b.  Humidifier (filled with distilled water)  c. Masks   4. The technologist should assess and measure patient for most appropriate mask prior to start of study. 5. CPAP should be initiated at 5 cm H20.  a. More pressure at start of study may be necessary if patient is morbidly obese or unable to fall asleep on a pressure of 5 cm H20   6. If apneas or frequent hypopneas are present, pressure settings should be increased by 2 cm H20. If occasional hypopneas, snoring, or mask flow limitation are present, pressure settings should be increased by 1 cm H20 and maintained for at least fie minutes to determine if events improve or resolve. Pressure settings may need to be increased more quickly during REM sleep given the limited amount of REM during sleep and the need to treat events during this stage. 7. If a mask leak occurs, the tech should first fix the leakage before raising the pressure. Otherwise, the final pressure setting chosen for the patient may be too high. Once the mask leak has been fixed, decrease the pressure to the last setting where mouth breathing and/or mask leakage was not present, and then re-titrate as indicated. Make sure to document directly on the study the steps taken to resolve the leak and the type of masks used. Pressure setting usually do not need to be set as high with a nasal-mask than with a full-face mask. 8. The recording technologist should document directly on the study at least every 30 minutes. 9. If the patient takes a break from wearing the mask, do not decrease the CPAP pressure on attempted return to sleep unless the patient remains awake for 15 minutes or the patient specifically requests that the pressure be lowered. 10. Do not raise pressure for central apneas. If the patient develops central apneas, pressure setting may need to be lowered. 11. If the patient is unable to tolerate CPAP secondary due to:  a. Persistent mouth breathing despite use of a full-face mask/chin strap  b.  Inability to exhale against higher expiratory pressures (typically beginning anywhere from 15 to 20 cm of H20.  c. Has frequent central apneas, the use of bilevel positive airway pressure may be indicated. Document directly on study why the patient is being switched from CPAP to bilevel. 12. Ensure that supine sleep has been seen on the chosen setting. Going above the chosen setting 1 or 2 cm H20 to show range may be helpful to ensure that the correct pressure has been established. BiLEVEL:    1. Technologist may change from CPAP to bilevel during a study if proven the patient is unable to tolerate CPAP. 2. Review the patients pertinent medical al history, previous sleep study or studies to ass the severity of sleep disordered breathing. Review of pertinent information will help to attain a better titration. 3. Applications of electrodes, montages, filters, sensitivities and scoring will be performed according to the current version of the AASM Scoring Manual.   4. Prior to initiating study collect all appropriate PAP supplies  a. Tubing   b. Humidifier (filled with distilled water)  c. Masks   5. The technologist should assess and measure patient for most appropriate mask prior to start of study. 6. If the patient has not previously been on CPAP, beginning pressures should be 8/4 cm H20 or higher if patient is morbidly obese or unable to fall asleep at lower pressures. If the patient has been previously successfully treated on CPAP start expiratory pressure at therapeutic setting and set inspiratory pressure 4 cm H20 higher. If advancing from CPAP to bilevel during a study expiratory pressure should be set at most successful CPAP setting and inspiratory pressure set 4 cm h20 higher. 7. The standard differential pressure utilized during bilevel titrations typically ranges from 3 to 5 cm H20, with 4 cm H20 being the most common.   Higher differential pressures may be needed in patients who are morbidly obese or who have neuromuscular diseases. 8. If apneas or frequent hypopneas are present, inspiratory and expiratory pressure settings should be increased by 2 cm H20. If occasional hypopneas, snoring, or mask flow limitation are present inspiratory and expiratory pressures settings should be increased by 1 cm h20 and maintained for at least 5 minutes to determine if events improve or resolve. Pressure settings may need to be increased more quickly during REM sleep given the limited amount of REM during sleep and the need to treat events during this stage. 9. If a mask leak occurs, the tech should first fix the leakage before raising the pressure. Otherwise, the final pressure setting chosen for the patient may be too high. Once the mask leak has been fixed, decrease the pressure to the last setting where mouth breathing and/or mask leakage was not present, and then re-titrate as indicated. Make sure to document directly on the study the steps taken to resolve the leak and the type of masks used. Pressure setting usually do not need to be set as high with a nasal-mask than with a full-face mask. 10. The recording technologist should document directly on the study at least every 30 minutes. 11. If the patient takes a break from wearing the mask, do not decrease the CPAP pressure on attempted return to sleep unless the patient remains awake for 15 minutes or the patient specifically requests that the pressure be lowered. 12. Do not raise pressure for central apneas. If the patient develops central apneas, pressure setting may need to be lowered. If the patient has central apneas on bilevel, the use of spontaneous (ST) mode may be indicated. a. ST mode may only be used in 2 cases  i. An order for ST mode with a primary diagnosis of central sleep apnea  ii. During a titration if obstructive events are less than 5/ hour and centrals must be greater than 50% of total respiratory events.    13. Ensure that supine sleep has been seen on the chosen setting. Going above the chosen setting 1 or 2 cm H20 to show range may be helpful to ensure that the correct pressure has been established.

## 2022-03-08 ENCOUNTER — TELEPHONE (OUTPATIENT)
Dept: PULMONOLOGY | Age: 42
End: 2022-03-08

## 2022-03-08 NOTE — TELEPHONE ENCOUNTER
Was put on O2 in hospital. She works at [employer] and has to take her tank to work with her and it does not last the whole shift so she will have to take 2 tanks with her to work. She states that they have her on medical leave but she needs a letter from you that she can be off until her 05/11 appt.

## 2022-03-09 NOTE — TELEPHONE ENCOUNTER
I feel patient needs sooner appt to be re-evaluated for home oxygen needs.  At least an appt for 6MWT

## 2022-03-13 DIAGNOSIS — G47.33 OSA (OBSTRUCTIVE SLEEP APNEA): Primary | ICD-10-CM

## 2022-03-13 NOTE — PROGRESS NOTES
[address] [place] [address]                               SLEEP STUDY REPORT    PATIENT NAME: Riaz Gonzalez                      :        1980  MED REC NO:   [id number]                           ROOM:  ACCOUNT NO:   [de-identified]                           ADMIT DATE: 2022  PROVIDER:     JOSE ANTONIO Patel Wetmore:  2022    DIAGNOSTIC POLYSOMNOGRAM WITH SPLIT NIGHT PROTOCOL    REFERRING PROVIDER:  Luz Marina Diaz. Marcia Melara DO    HISTORY:  The patient is a 60-year-old morbidly obese female with  chronic respiratory failure. Her weight is 270 pounds, height 71  inches. BMI 51. METHODS:  The patient underwent digital polysomnography in compliance  with the standards and specifications from the AASM Manual including the  simultaneous recording of 3 EEG channels (F4-M1, C4-M1, and O2-M1 with  back up electrodes F3-M2, C3-M2, and O1-M2), 2 EOG channels (E1-M2, and  E2-M1,), EMG (chin, left & right leg), EKG, Nonin pulse oximetry with   less than 2 second averaging time, body position, airflow recorded by  oral-nasal thermal sensor and nasal air pressure transducer, plus  respiratory effort recorded by calibrated respiratory inductance  plethysmography (RIP), flow volume loop, sound and video. Sleep staging  and scoring followed the standard put forth by the American Academy of  Sleep Medicine and utilized the 1B obstructive hypopnea event  desaturation of 4 percent or greater. DETAILS OF THE STUDY:  The study has two parts; initial diagnostic  portion and followup therapeutic portion. The patient uses supplemental  oxygen at home. The study was initiated without supplemental oxygen. Initial part or diagnostic portion of the test:  Lights out 09:17 p.m.,  lights on 12:23 a.m. Total recording time 185.6 minutes. Sleep period  time 169.8 minutes. Total sleep time 152.3 minutes. Sleep efficiency  82.1%.   Latency to 20/16 and  increased to 21/17. Due to the persistence of nocturnal oxygen  desaturation even after correcting the obstructive events, supplemental  oxygen was bled into the system up to 2 liters per minute. Optimal  titration was obtained with a BiPAP of 21/17 which controlled the  obstructive events even during supine REM sleep. This pressure turnout  was monitored for 1 hour and 17 minutes. There were 13 minutes of REM  sleep and 1 hour and 2 minutes of non-REM sleep. There were 6 residual  obstructive hypopneas for an AHI of 4.8. ASSESSMENT:  1.  Obstructive sleep apnea with an AHI of 25.2 which worsens to severe  category during supine sleep with a supine AHI of 32.2. Severe oxygen  desaturation which is present even before lights out and consistent with  the given history of chronic respiratory failure. 2.  Episodes of sinus tachycardia and PVCs associated with obstructive  events. RECOMMENDATIONS:  1. BiPAP 21/17 along with supplemental oxygen bled into the system at 2  liters per minute. 2.  Chosen interface is an F30 medium size full-face mask. Follow up in  the sleep clinic 8 weeks after initiating PAP therapies to review  download.         Kiet Cabral M.D.    D: 03/12/2022 17:33:31       T: 03/13/2022 15:02:05     [email or web address]  Job#: [id number]     Doc#: [id number]    CC:

## 2022-03-17 ENCOUNTER — TELEPHONE (OUTPATIENT)
Dept: SLEEP CENTER | Age: 42
End: 2022-03-17

## 2022-03-22 ENCOUNTER — OFFICE VISIT (OUTPATIENT)
Dept: PULMONOLOGY | Age: 42
End: 2022-03-22
Payer: COMMERCIAL

## 2022-03-22 VITALS
BODY MASS INDEX: 52.3 KG/M2 | TEMPERATURE: 98.4 F | DIASTOLIC BLOOD PRESSURE: 86 MMHG | OXYGEN SATURATION: 94 % | WEIGHT: 277 LBS | HEIGHT: 61 IN | SYSTOLIC BLOOD PRESSURE: 138 MMHG | HEART RATE: 121 BPM

## 2022-03-22 DIAGNOSIS — J44.9 CHRONIC OBSTRUCTIVE PULMONARY DISEASE, UNSPECIFIED COPD TYPE (HCC): ICD-10-CM

## 2022-03-22 DIAGNOSIS — J96.02 ACUTE RESPIRATORY FAILURE WITH HYPOXIA AND HYPERCAPNIA (HCC): Primary | ICD-10-CM

## 2022-03-22 DIAGNOSIS — J96.01 ACUTE RESPIRATORY FAILURE WITH HYPOXIA AND HYPERCAPNIA (HCC): Primary | ICD-10-CM

## 2022-03-22 DIAGNOSIS — G47.33 OSA (OBSTRUCTIVE SLEEP APNEA): ICD-10-CM

## 2022-03-22 DIAGNOSIS — Z09 HOSPITAL DISCHARGE FOLLOW-UP: ICD-10-CM

## 2022-03-22 PROCEDURE — 3023F SPIROM DOC REV: CPT | Performed by: NURSE PRACTITIONER

## 2022-03-22 PROCEDURE — 1036F TOBACCO NON-USER: CPT | Performed by: NURSE PRACTITIONER

## 2022-03-22 PROCEDURE — 94618 PULMONARY STRESS TESTING: CPT | Performed by: NURSE PRACTITIONER

## 2022-03-22 PROCEDURE — 99204 OFFICE O/P NEW MOD 45 MIN: CPT | Performed by: NURSE PRACTITIONER

## 2022-03-22 PROCEDURE — G8427 DOCREV CUR MEDS BY ELIG CLIN: HCPCS | Performed by: NURSE PRACTITIONER

## 2022-03-22 PROCEDURE — G8417 CALC BMI ABV UP PARAM F/U: HCPCS | Performed by: NURSE PRACTITIONER

## 2022-03-22 PROCEDURE — 1111F DSCHRG MED/CURRENT MED MERGE: CPT | Performed by: NURSE PRACTITIONER

## 2022-03-22 PROCEDURE — G8484 FLU IMMUNIZE NO ADMIN: HCPCS | Performed by: NURSE PRACTITIONER

## 2022-03-22 ASSESSMENT — ENCOUNTER SYMPTOMS
ALLERGIC/IMMUNOLOGIC NEGATIVE: 1
SHORTNESS OF BREATH: 1
COUGH: 0
EYES NEGATIVE: 1
GASTROINTESTINAL NEGATIVE: 1
WHEEZING: 0

## 2022-03-24 ENCOUNTER — TELEPHONE (OUTPATIENT)
Dept: PULMONOLOGY | Age: 42
End: 2022-03-24

## 2022-03-24 NOTE — TELEPHONE ENCOUNTER
Patient called and the POC for srmhe is on back order and will not be in for a year. I called [facility] and [facility] and they don't do a POC,  I informed patient and she understood that she will wait.

## 2022-05-06 ENCOUNTER — TELEPHONE (OUTPATIENT)
Dept: PULMONOLOGY | Age: 42
End: 2022-05-06

## 2022-05-09 NOTE — TELEPHONE ENCOUNTER
Discussed with Mehran Capone is scheduled for follow-up with her future questions to be sent to Emperatriz Agosto CNP.

## 2022-05-10 ENCOUNTER — TELEPHONE (OUTPATIENT)
Dept: PULMONOLOGY | Age: 42
End: 2022-05-10

## 2022-05-10 NOTE — TELEPHONE ENCOUNTER
Attempted to call patient's insurance [facility]) to set up a peer to peer for the patient's CT denial. I was on hold for over 37 minutes. Asked Stevie Russo what I should do she advised me to leave a message. I left a message to have them call our office back to set up a peer to peer.

## 2022-05-25 NOTE — TELEPHONE ENCOUNTER
Called AYANNA at [phone]. She states that they are unable to set up a specific time and/or day to complete the [id number]. She stated that Jeimy Eldridge would have to call the number I did and get them the call reference number that I received to complete the [id number]. Please advise.

## 2022-05-25 NOTE — TELEPHONE ENCOUNTER
[facility] please let me know when you would like to do the peer to peer and I will call in for you.

## 2022-05-26 ENCOUNTER — TELEPHONE (OUTPATIENT)
Dept: PULMONOLOGY | Age: 42
End: 2022-05-26

## 2022-05-26 DIAGNOSIS — J96.02 ACUTE RESPIRATORY FAILURE WITH HYPOXIA AND HYPERCAPNIA (HCC): Primary | ICD-10-CM

## 2022-05-26 DIAGNOSIS — J18.9 PNEUMONIA DUE TO INFECTIOUS ORGANISM, UNSPECIFIED LATERALITY, UNSPECIFIED PART OF LUNG: ICD-10-CM

## 2022-05-26 DIAGNOSIS — J96.01 ACUTE RESPIRATORY FAILURE WITH HYPOXIA AND HYPERCAPNIA (HCC): Primary | ICD-10-CM

## 2022-05-26 NOTE — TELEPHONE ENCOUNTER
Phoned to schedule [id number], per insurance denial was a final decision. No appeal was submitted in allowed time frame. Notified Eddi Camp she requested Cxr prior to next f/u.  Phoned patient and lm for patient to call back to office to notify that CT Chest was canceled d/t insurance denial and she will need to have CXR 2-3 days prior to f/u

## 2022-05-26 NOTE — TELEPHONE ENCOUNTER
Spoke to Samuel Sotelo CNP insurance denied patients CT Chest, Sakshi [facility] requesting patient have CXR prior to f/u 6/16/22 and cancel CT Chest. Phoned patient to notify, no answer, no hippaa to leave detailed vm, lm for pt to cb to office.

## 2022-06-03 ENCOUNTER — TELEPHONE (OUTPATIENT)
Dept: PULMONOLOGY | Age: 42
End: 2022-06-03

## 2022-06-03 NOTE — TELEPHONE ENCOUNTER
Patient is going to get her CXR today and wants the order sent to [facility].  Order faxed to [facility]

## 2022-06-06 ENCOUNTER — HOSPITAL ENCOUNTER (OUTPATIENT)
Dept: PULMONOLOGY | Age: 42
Discharge: HOME | End: 2022-06-06
Payer: COMMERCIAL

## 2022-06-06 DIAGNOSIS — J44.9 CHRONIC OBSTRUCTIVE PULMONARY DISEASE, UNSPECIFIED COPD TYPE (HCC): ICD-10-CM

## 2022-06-06 PROCEDURE — 94726 PLETHYSMOGRAPHY LUNG VOLUMES: CPT

## 2022-06-06 PROCEDURE — 94729 DIFFUSING CAPACITY: CPT

## 2022-06-06 PROCEDURE — 94060 EVALUATION OF WHEEZING: CPT

## 2022-06-10 ENCOUNTER — TELEPHONE (OUTPATIENT)
Dept: CARDIAC REHAB | Age: 42
End: 2022-06-10

## 2022-06-10 DIAGNOSIS — J96.01 ACUTE RESPIRATORY FAILURE WITH HYPOXIA AND HYPERCAPNIA (HCC): ICD-10-CM

## 2022-06-10 DIAGNOSIS — J18.9 PNEUMONIA DUE TO INFECTIOUS ORGANISM, UNSPECIFIED LATERALITY, UNSPECIFIED PART OF LUNG: ICD-10-CM

## 2022-06-10 DIAGNOSIS — J96.02 ACUTE RESPIRATORY FAILURE WITH HYPOXIA AND HYPERCAPNIA (HCC): ICD-10-CM

## 2022-06-10 NOTE — TELEPHONE ENCOUNTER
PULMONARY REHABILITATION       PULMONARY REHAB: PATIENT NOT ENROLLED       Patient Name: Nina Torres   Patient YOB: 1980      Referring Provider: PEACE Mullins  Diagnosis: COPD  Date Patient Referred: 3/22/22      Thank you for referring your patient to our Pulmonary Rehab program. At this time, the referred patient is currently not enrolled in our program for the following reason(s):    []  Patient is not interested    [x]  PFT does not qualify patient for Pulmonary Rehab/ RTR     []  Attempted to call patient, call has not been returned    []  Insurance Issue(s) (i.e. lack of coverage, copay, etc)    []  Other:       Note: Based on insurance qualifications, patient does not qualify for Pulmonary Rehab for COPD based on the FEV1/FVC and FEV1% results on his/her PFT. Qualifications: FEV1/FVC actual <70 AND FEV1% <80. Ava Rojas 's FEV1/FVC actual is 80     Thank you for your continued support of our Pulmonary Rehab program. If the patient would like to attend rehab in the future, please send a new referral. Please call us at [phone] if you have any questions or concerns.       Electronically signed by Aleah Stanley on 6/10/2022 at 1:18 PM   Pulmonary Rehab Staff Signature

## 2022-06-15 ENCOUNTER — HOSPITAL ENCOUNTER (OUTPATIENT)
Dept: GENERAL RADIOLOGY | Age: 42
Discharge: HOME | End: 2022-06-15

## 2022-06-15 DIAGNOSIS — Z00.6 ENCOUNTER FOR EXAMINATION FOR NORMAL COMPARISON AND CONTROL IN CLINICAL RESEARCH PROGRAM: ICD-10-CM

## 2022-06-16 ENCOUNTER — OFFICE VISIT (OUTPATIENT)
Dept: PULMONOLOGY | Age: 42
End: 2022-06-16
Payer: COMMERCIAL

## 2022-06-16 VITALS
DIASTOLIC BLOOD PRESSURE: 86 MMHG | WEIGHT: 288.4 LBS | SYSTOLIC BLOOD PRESSURE: 138 MMHG | HEIGHT: 61 IN | BODY MASS INDEX: 54.45 KG/M2 | TEMPERATURE: 98 F | OXYGEN SATURATION: 91 % | HEART RATE: 129 BPM

## 2022-06-16 DIAGNOSIS — J96.01 ACUTE RESPIRATORY FAILURE WITH HYPOXIA (HCC): ICD-10-CM

## 2022-06-16 DIAGNOSIS — J44.1 COPD EXACERBATION (HCC): Primary | ICD-10-CM

## 2022-06-16 DIAGNOSIS — F17.200 CURRENT SMOKER: ICD-10-CM

## 2022-06-16 DIAGNOSIS — J44.9 COPD, SEVERE (HCC): ICD-10-CM

## 2022-06-16 PROCEDURE — 36415 COLL VENOUS BLD VENIPUNCTURE: CPT | Performed by: NURSE PRACTITIONER

## 2022-06-16 PROCEDURE — 1036F TOBACCO NON-USER: CPT | Performed by: NURSE PRACTITIONER

## 2022-06-16 PROCEDURE — 99214 OFFICE O/P EST MOD 30 MIN: CPT | Performed by: NURSE PRACTITIONER

## 2022-06-16 PROCEDURE — 94664 DEMO&/EVAL PT USE INHALER: CPT | Performed by: NURSE PRACTITIONER

## 2022-06-16 PROCEDURE — 94640 AIRWAY INHALATION TREATMENT: CPT | Performed by: NURSE PRACTITIONER

## 2022-06-16 PROCEDURE — 99406 BEHAV CHNG SMOKING 3-10 MIN: CPT | Performed by: NURSE PRACTITIONER

## 2022-06-16 PROCEDURE — 3023F SPIROM DOC REV: CPT | Performed by: NURSE PRACTITIONER

## 2022-06-16 PROCEDURE — G8417 CALC BMI ABV UP PARAM F/U: HCPCS | Performed by: NURSE PRACTITIONER

## 2022-06-16 PROCEDURE — G8427 DOCREV CUR MEDS BY ELIG CLIN: HCPCS | Performed by: NURSE PRACTITIONER

## 2022-06-16 RX ORDER — UMECLIDINIUM BROMIDE AND VILANTEROL TRIFENATATE 62.5; 25 UG/1; UG/1
1 POWDER RESPIRATORY (INHALATION) DAILY
Qty: 1 EACH | Refills: 11 | Status: SHIPPED | OUTPATIENT
Start: 2022-06-16

## 2022-06-16 RX ORDER — PREDNISONE 10 MG/1
TABLET ORAL
Qty: 30 TABLET | Refills: 0 | Status: SHIPPED | OUTPATIENT
Start: 2022-06-16 | End: 2022-06-16

## 2022-06-16 RX ORDER — PREDNISONE 10 MG/1
TABLET ORAL
Qty: 30 TABLET | Refills: 0 | Status: SHIPPED | OUTPATIENT
Start: 2022-06-16 | End: 2022-06-26

## 2022-06-16 ASSESSMENT — ENCOUNTER SYMPTOMS
COUGH: 1
ALLERGIC/IMMUNOLOGIC NEGATIVE: 1
WHEEZING: 1
EYES NEGATIVE: 1
SHORTNESS OF BREATH: 1
GASTROINTESTINAL NEGATIVE: 1

## 2022-06-16 NOTE — PROGRESS NOTES
[facility]    Patient: Kiet Banda, 37 y.o.   : 1980      Subjective     Chief Complaint   Patient presents with    Follow-up     3 month pulm follow up, CXR 6/3/22, PFT 22        HPI  Juarez Saldana is here for follow up for COPD with PFT testing. Present today hypoxic but denying feeling ill  SOB been a little worse last couple days due to hot weather and high humidity  6MWT needed today but patient unable to do and result is not going to be accurate   Currently in the office on 4LPM  Maintaining spo2 89-92% this is without ambulation   Denies any cough or CP   Afebrile   Patient needing oxygen at 4LPM continuously currently titrated up 4LPM to keep spO2 89-92%  Cannot ambulate today   Still smoking roughly 3-4 cigarettes/day   Patient to be on BiPAP 21/17 cm H2O with 2LPM bleed in - ordered in march     Progress History:   Since last visit any new medical issues? No  New ER or hospital visits? No  Any new or changes in medicines? No  Using inhalers? Yes   Are they helpful?  Yes   Past Medical hx   PMH:  Past Medical History:   Diagnosis Date    Anxiety     Depression     Diabetes mellitus ([facility] [address]     Hyperlipidemia     Hypertension      SURGICAL HISTORY:  Past Surgical History:   Procedure Laterality Date    ABSCESS DRAINAGE       SOCIAL HISTORY:  Social History     Tobacco Use    Smoking status: Former Smoker     Packs/day: 1.00     Years: 15.00     Pack years: 15.00     Types: Cigarettes     Start date: 1999     Quit date: 2022     Years since quittin.2    Smokeless tobacco: Never Used   Vaping Use    Vaping Use: Never used   Substance Use Topics    Alcohol use: No    Drug use: No     ALLERGIES:  Allergies   Allergen Reactions    Codeine Hives     FAMILY HISTORY:  Family History   Problem Relation Age of Onset    High Blood Pressure Mother     Heart Disease Mother     High Cholesterol Mother     Depression Mother     Stroke Mother     Diabetes Maternal Aunt     High Blood Pressure Maternal Aunt     High Cholesterol Maternal Aunt      CURRENT MEDICATIONS:  Current Outpatient Medications   Medication Sig Dispense Refill    umeclidinium-vilanterol (ANORO ELLIPTA) 62.5-25 MCG/INH AEPB inhaler Inhale 1 puff into the lungs daily 1 each 11    predniSONE (DELTASONE) 10 MG tablet 4 tablets for 3 days, then 3 tablets for 3 days, then 2 tablets for 3 days, then 1 tablet for 3 days 30 tablet 0    budesonide (PULMICORT) 0.5 MG/2ML nebulizer suspension Take 2 mLs by nebulization 2 times daily 60 each 3    hydrALAZINE (APRESOLINE) 50 MG tablet Take 1 tablet by mouth every 8 hours 90 tablet 3    nicotine (NICODERM CQ) 14 MG/24HR Place 1 patch onto the skin daily 30 patch 3    albuterol (PROVENTIL) (2.5 MG/3ML) 0.083% nebulizer solution Take 3 mLs by nebulization every 4 hours as needed for Wheezing 120 each 3    albuterol (PROVENTIL) (2.5 MG/3ML) 0.083% nebulizer solution Take 3 mLs by nebulization 2 times daily 120 each 3    pregabalin (LYRICA) 100 MG capsule Take 200 mg by mouth 2 times daily.  ibuprofen (ADVIL;MOTRIN) 600 MG tablet Take 1 tablet by mouth 3 times daily as needed for Pain 30 tablet 0    lidocaine (LIDODERM) 5 % Place 1 patch onto the skin daily 12 hours on, 12 hours off.  30 patch 0    ondansetron (ZOFRAN ODT) 4 MG disintegrating tablet Take 1 tablet by mouth every 8 hours as needed for Nausea or Vomiting 6 tablet 0    Lactobacillus (PROBIOTIC ACIDOPHILUS) CAPS Take 1 capsule by mouth daily 30 capsule 0    metoprolol (LOPRESSOR) 100 MG tablet Take 1 tablet by mouth 2 times daily Hold sbp less 110 HR less than 55 180 tablet 3    diltiazem (CARDIZEM) 30 MG tablet Take 1 tablet by mouth 3 times daily 270 tablet 3    ivabradine (CORLANOR) 5 MG TABS tablet Take 1 tablet by mouth 2 times daily (with meals) 180 tablet 3    amitriptyline (ELAVIL) 10 MG tablet Take 1 tablet by mouth nightly 30 tablet 4    lisinopril (PRINIVIL;ZESTRIL) 10 MG tablet take 1 tablet by mouth once daily 30 tablet 1    Blood Pressure Monitor KIT 1 kit by Does not apply route as needed (so pt may check her blood pressure when needed) 1 kit 0    insulin glargine (LANTUS) 100 UNIT/ML injection vial Inject 30 Units into the skin 2 times daily 1 vial 3    insulin aspart (NOVOLOG) 100 UNIT/ML injection vial Inject 25 Units into the skin 3 times daily (before meals) Indications: Inject 25 units plus HPWO sliding scale three times a day      OLANZapine (ZYPREXA) 5 MG tablet Take 10 mg by mouth nightly       atorvastatin (LIPITOR) 40 MG tablet Take 40 mg by mouth nightly       aspirin 81 MG tablet Take 81 mg by mouth daily.  gabapentin (NEURONTIN) 800 MG tablet Take 800 mg by mouth 3 times daily.  glimepiride (AMARYL) 4 MG tablet Take 4 mg by mouth 2 times daily (with meals).  metFORMIN (GLUCOPHAGE) 1000 MG tablet Take 2,000 mg by mouth 2 times daily (with meals)       diclofenac sodium 1 % GEL Apply 2 g topically 2 times daily for 10 days 1 Tube 3     No current facility-administered medications for this visit. ROS   Review of Systems   Constitutional: Positive for fatigue. Negative for chills and fever. HENT: Negative. Negative for congestion. Eyes: Negative. Respiratory: Positive for cough, shortness of breath and wheezing. Cardiovascular: Positive for leg swelling. Negative for chest pain. Gastrointestinal: Negative. Endocrine: Negative. Genitourinary: Negative. Musculoskeletal: Negative. Allergic/Immunologic: Negative. Neurological: Negative. Hematological: Negative. Psychiatric/Behavioral: Negative. Negative for sleep disturbance.         Physical exam   /86   Pulse (!) 129   Temp 98 °F (36.7 °C)   Ht 5' 1\" (1.549 m)   Wt 288 lb 6.4 oz (130.8 kg)   SpO2 91% Comment: r/a  BMI 54.49 kg/m²    Wt Readings from Last 3 Encounters:   06/16/22 288 lb 6.4 oz (130.8 kg)   03/22/22 277 lb COPD.  I entered a DME order for home oxygen because the diagnosis and testing requires the patient to have supplemental oxygen. Condition will improve or be benefited by oxygen use. The patient is  able to perform good mobility in a home setting and therefore does require the use of a portable oxygen system. The need for this equipment was discussed with the patient and she understands and is in agreement. 4LPM continuously     Assessment      Diagnosis Orders   1. COPD exacerbation ([facility] [address]  [address] - [place] Pressurized/nonpressurized inhalation treatment   2. COPD, severe ([facility] [address]  Alpha-1-Antitrypsin    umeclidinium-vilanterol (ANORO ELLIPTA) 62.5-25 MCG/INH AEPB inhaler    DME Order for Home Oxygen as OP   3. Acute respiratory failure with hypoxia (HCC)     4. Current smoker           Plan   -PFT reviewed with patient Severe obstruction seen with restriction- restriction most likely 2/2 BMI of 54. No significant BD response seen   -6MWT not done today in the office due to probable COPD exacerbation  -Currently needing oxygen at 4LPM continuously; DME note placed   -Albuterol treatment x 1 today in the office   -Will start Prednisone taper today   -Stop STiolto   --Will start patient on Anoro 1puff daily in am. Agueda Renny educated and demonstrated by me in my office how to use Anoro. Patient verbalizes understanding. She was detailed about mechanism of action of drug along with associated side effects. She agreed to take the risk and medication. She verbalizes understanding.  -Continue Pulmicort neb BID   -A1A swab today in the office   -Advised to maintain pneumonia vaccine with PCP and to take flu vaccine this coming season.  -Advised patient to call office with any changes, questions, or concerns regarding respiratory status  -Smoking cessation strongly encouraged  Recommend patient stop smoking,Smoking cessation discussed for 3-5 min Educated patient on health hazards and negative effects of smoking.   Counseled on ways to quit,  Offered cessation assistance with prescription mediations,  over the counter remedies, community resources/ phone APPs. Patient refuses additional assistance at this time. Nicotine gum and lozenges  ¨ Nicotine inhaler  · Ask your doctor about bupropion (Wellbutrin) or varenicline (Chantix), which are prescription medicines. They do not contain nicotine. They help you by reducing withdrawal symptoms, such as stress and anxiety.   · Some people find hypnosis, acupuncture, and massage helpful for ending the smoking habit.  -Next appt need to check on status of patient receiving BiPAP    Will see Janine daniel in: 1 month 6MWT next visit     Jose Metzger, [facility]  6/16/2022

## 2022-06-19 ENCOUNTER — HOSPITAL ENCOUNTER (EMERGENCY)
Age: 42
Discharge: LEFT BEFORE DISCHARGE | End: 2022-06-19
Payer: COMMERCIAL

## 2022-06-19 VITALS
WEIGHT: 270 LBS | TEMPERATURE: 98.8 F | SYSTOLIC BLOOD PRESSURE: 126 MMHG | BODY MASS INDEX: 53.01 KG/M2 | DIASTOLIC BLOOD PRESSURE: 76 MMHG | HEIGHT: 60 IN | OXYGEN SATURATION: 92 % | RESPIRATION RATE: 18 BRPM | HEART RATE: 130 BPM

## 2022-06-19 DIAGNOSIS — L03.319 CELLULITIS AND ABSCESS OF TRUNK: Primary | ICD-10-CM

## 2022-06-19 DIAGNOSIS — L03.116 CELLULITIS OF LEFT LOWER EXTREMITY: ICD-10-CM

## 2022-06-19 DIAGNOSIS — Z53.29 LEFT AGAINST MEDICAL ADVICE: ICD-10-CM

## 2022-06-19 DIAGNOSIS — L02.219 CELLULITIS AND ABSCESS OF TRUNK: Primary | ICD-10-CM

## 2022-06-19 DIAGNOSIS — N76.2 CELLULITIS OF LABIA: ICD-10-CM

## 2022-06-19 DIAGNOSIS — E11.65 UNCONTROLLED TYPE 2 DIABETES MELLITUS WITH HYPERGLYCEMIA (HCC): ICD-10-CM

## 2022-06-19 LAB
ALBUMIN SERPL-MCNC: 2.3 G/DL (ref 3.5–5.1)
ALP BLD-CCNC: 144 U/L (ref 38–126)
ALT SERPL-CCNC: 13 U/L (ref 11–66)
ANION GAP SERPL CALCULATED.3IONS-SCNC: 15 MEQ/L (ref 8–16)
AST SERPL-CCNC: 13 U/L (ref 5–40)
BASOPHILIA: ABNORMAL
BASOPHILS # BLD: 0.3 %
BASOPHILS ABSOLUTE: 0.1 THOU/MM3 (ref 0–0.1)
BILIRUB SERPL-MCNC: 0.7 MG/DL (ref 0.3–1.2)
BUN BLDV-MCNC: 12 MG/DL (ref 7–22)
CALCIUM SERPL-MCNC: 8.6 MG/DL (ref 8.5–10.5)
CHLORIDE BLD-SCNC: 78 MEQ/L (ref 98–111)
CO2: 28 MEQ/L (ref 23–33)
CREAT SERPL-MCNC: 0.6 MG/DL (ref 0.4–1.2)
EOSINOPHIL # BLD: 0 %
EOSINOPHILS ABSOLUTE: 0 THOU/MM3 (ref 0–0.4)
ERYTHROCYTE [DISTWIDTH] IN BLOOD BY AUTOMATED COUNT: 14.4 % (ref 11.5–14.5)
ERYTHROCYTE [DISTWIDTH] IN BLOOD BY AUTOMATED COUNT: 49.2 FL (ref 35–45)
GFR SERPL CREATININE-BSD FRML MDRD: > 90 ML/MIN/1.73M2
GLUCOSE BLD-MCNC: 480 MG/DL (ref 70–108)
GLUCOSE BLD-MCNC: 630 MG/DL (ref 70–108)
HCT VFR BLD CALC: 44.7 % (ref 37–47)
HEMOGLOBIN: 14.2 GM/DL (ref 12–16)
IMMATURE GRANS (ABS): 0.5 THOU/MM3 (ref 0–0.07)
IMMATURE GRANULOCYTES: 1.8 %
LACTIC ACID, SEPSIS: 2.3 MMOL/L (ref 0.5–1.9)
LACTIC ACID, SEPSIS: 2.9 MMOL/L (ref 0.5–1.9)
LYMPHOCYTES # BLD: 2.8 %
LYMPHOCYTES ABSOLUTE: 0.8 THOU/MM3 (ref 1–4.8)
MCH RBC QN AUTO: 29.8 PG (ref 26–33)
MCHC RBC AUTO-ENTMCNC: 31.8 GM/DL (ref 32.2–35.5)
MCV RBC AUTO: 93.9 FL (ref 81–99)
MONOCYTES # BLD: 7 %
MONOCYTES ABSOLUTE: 1.9 THOU/MM3 (ref 0.4–1.3)
NUCLEATED RED BLOOD CELLS: 0 /100 WBC
OSMOLALITY CALCULATION: 273.3 MOSMOL/KG (ref 275–300)
PLATELET # BLD: 344 THOU/MM3 (ref 130–400)
PLATELET ESTIMATE: ADEQUATE
PMV BLD AUTO: 10.3 FL (ref 9.4–12.4)
POTASSIUM SERPL-SCNC: 4.2 MEQ/L (ref 3.5–5.2)
PROCALCITONIN: 0.83 NG/ML (ref 0.01–0.09)
RBC # BLD: 4.76 MILL/MM3 (ref 4.2–5.4)
SCAN OF BLOOD SMEAR: NORMAL
SEG NEUTROPHILS: 88.1 %
SEGMENTED NEUTROPHILS ABSOLUTE COUNT: 24.1 THOU/MM3 (ref 1.8–7.7)
SODIUM BLD-SCNC: 121 MEQ/L (ref 135–145)
TOTAL PROTEIN: 6.5 G/DL (ref 6.1–8)
WBC # BLD: 27.4 THOU/MM3 (ref 4.8–10.8)

## 2022-06-19 PROCEDURE — 36415 COLL VENOUS BLD VENIPUNCTURE: CPT

## 2022-06-19 PROCEDURE — 82948 REAGENT STRIP/BLOOD GLUCOSE: CPT

## 2022-06-19 PROCEDURE — 56405 I&D VULVA/PERINEAL ABSCESS: CPT

## 2022-06-19 PROCEDURE — 87075 CULTR BACTERIA EXCEPT BLOOD: CPT

## 2022-06-19 PROCEDURE — 96375 TX/PRO/DX INJ NEW DRUG ADDON: CPT

## 2022-06-19 PROCEDURE — 87040 BLOOD CULTURE FOR BACTERIA: CPT

## 2022-06-19 PROCEDURE — 6370000000 HC RX 637 (ALT 250 FOR IP): Performed by: PHYSICIAN ASSISTANT

## 2022-06-19 PROCEDURE — 87205 SMEAR GRAM STAIN: CPT

## 2022-06-19 PROCEDURE — 85025 COMPLETE CBC W/AUTO DIFF WBC: CPT

## 2022-06-19 PROCEDURE — 96366 THER/PROPH/DIAG IV INF ADDON: CPT

## 2022-06-19 PROCEDURE — 6360000002 HC RX W HCPCS: Performed by: PHYSICIAN ASSISTANT

## 2022-06-19 PROCEDURE — 87070 CULTURE OTHR SPECIMN AEROBIC: CPT

## 2022-06-19 PROCEDURE — 96367 TX/PROPH/DG ADDL SEQ IV INF: CPT

## 2022-06-19 PROCEDURE — 80053 COMPREHEN METABOLIC PANEL: CPT

## 2022-06-19 PROCEDURE — 96365 THER/PROPH/DIAG IV INF INIT: CPT

## 2022-06-19 PROCEDURE — 84145 PROCALCITONIN (PCT): CPT

## 2022-06-19 PROCEDURE — 99284 EMERGENCY DEPT VISIT MOD MDM: CPT

## 2022-06-19 PROCEDURE — 83605 ASSAY OF LACTIC ACID: CPT

## 2022-06-19 PROCEDURE — 2580000003 HC RX 258: Performed by: PHYSICIAN ASSISTANT

## 2022-06-19 RX ORDER — SULFAMETHOXAZOLE AND TRIMETHOPRIM 800; 160 MG/1; MG/1
1 TABLET ORAL 2 TIMES DAILY
Qty: 20 TABLET | Refills: 0 | Status: SHIPPED | OUTPATIENT
Start: 2022-06-19 | End: 2022-06-29

## 2022-06-19 RX ORDER — KETOROLAC TROMETHAMINE 30 MG/ML
30 INJECTION, SOLUTION INTRAMUSCULAR; INTRAVENOUS ONCE
Status: COMPLETED | OUTPATIENT
Start: 2022-06-19 | End: 2022-06-19

## 2022-06-19 RX ORDER — 0.9 % SODIUM CHLORIDE 0.9 %
1000 INTRAVENOUS SOLUTION INTRAVENOUS ONCE
Status: COMPLETED | OUTPATIENT
Start: 2022-06-19 | End: 2022-06-19

## 2022-06-19 RX ORDER — CEPHALEXIN 500 MG/1
500 CAPSULE ORAL 4 TIMES DAILY
Qty: 40 CAPSULE | Refills: 0 | Status: SHIPPED | OUTPATIENT
Start: 2022-06-19 | End: 2022-06-29

## 2022-06-19 RX ORDER — HYDROCODONE BITARTRATE AND ACETAMINOPHEN 5; 325 MG/1; MG/1
1 TABLET ORAL ONCE
Status: COMPLETED | OUTPATIENT
Start: 2022-06-19 | End: 2022-06-19

## 2022-06-19 RX ADMIN — Medication 10 UNITS: at 12:12

## 2022-06-19 RX ADMIN — SODIUM CHLORIDE 1000 ML: 9 INJECTION, SOLUTION INTRAVENOUS at 12:11

## 2022-06-19 RX ADMIN — HYDROCODONE BITARTRATE AND ACETAMINOPHEN 1 TABLET: 5; 325 TABLET ORAL at 12:13

## 2022-06-19 RX ADMIN — VANCOMYCIN HYDROCHLORIDE 2000 MG: 1 INJECTION, POWDER, LYOPHILIZED, FOR SOLUTION INTRAVENOUS at 13:17

## 2022-06-19 RX ADMIN — CEFAZOLIN SODIUM 2000 MG: 10 INJECTION, POWDER, FOR SOLUTION INTRAVENOUS at 12:18

## 2022-06-19 RX ADMIN — KETOROLAC TROMETHAMINE 30 MG: 30 INJECTION, SOLUTION INTRAMUSCULAR; INTRAVENOUS at 12:13

## 2022-06-19 ASSESSMENT — ENCOUNTER SYMPTOMS
EYE PAIN: 0
RHINORRHEA: 0
CHEST TIGHTNESS: 0
COUGH: 0
SORE THROAT: 0
WHEEZING: 0
ABDOMINAL PAIN: 0
SHORTNESS OF BREATH: 0
NAUSEA: 0
VOMITING: 0

## 2022-06-19 ASSESSMENT — PAIN DESCRIPTION - ORIENTATION: ORIENTATION: LEFT

## 2022-06-19 ASSESSMENT — PAIN - FUNCTIONAL ASSESSMENT: PAIN_FUNCTIONAL_ASSESSMENT: 0-10

## 2022-06-19 ASSESSMENT — PAIN DESCRIPTION - LOCATION: LOCATION: VAGINA

## 2022-06-19 NOTE — ED NOTES
Pt signed AMA paper. Pt agreed to stay until ATB finished. Denies other needs. Resp regular. Call light in reach.       Raaht Louise RN  06/19/22 [id number]

## 2022-06-19 NOTE — ED PROVIDER NOTES
[facility] EMERGENCY DEPT      CHIEF COMPLAINT       Chief Complaint   Patient presents with    Abscess     vagina       Nurses Notes reviewed and I agree except as noted in the HPI. HISTORY OF PRESENT ILLNESS    Bradley García is a 37 y.o. female with pmhx of uncontrolled T2DM, HTN, HLD, COPD, and prior sepsis who presents for an abscess on her left labia. Patient states that she first noticed it 3 days ago and it has gradually gotten more swollen and painful. She rates her pain as a 10/10. Patient states that she has a history of getting abscesses but has never had one in this location before. She denies squeezing the area. Patient says that she's noticed some blood and discharge on the toilet paper when she wipes. She says that she has also noticed a foul odor from the area. Patient says that she tries to maintain good hygiene in her groin/private region but that it's hard to see. She states that she has tried icing the area and using ibuprofen and Tylenol for the pain. Patient says that her sugars are not well controlled and that she is a smoker. Patient is oxygen dependent due to COPD. Denies fever, chills, N/V, abdominal pain. REVIEW OF SYSTEMS     Review of Systems   Constitutional: Negative for activity change, appetite change, chills, diaphoresis, fatigue and fever. HENT: Negative for congestion, ear pain, rhinorrhea and sore throat. Eyes: Negative for pain. Respiratory: Negative for cough, chest tightness, shortness of breath and wheezing. Cardiovascular: Negative for chest pain and palpitations. Gastrointestinal: Negative for abdominal pain, nausea and vomiting. Genitourinary: Positive for pelvic pain. Negative for difficulty urinating, dysuria, hematuria and vaginal pain. Swelling and pain of the left labia secondary to abscess. Patient unsure of groin/private area appearance due to body habitus.  Has noticed an odor and some blood and drainage   Musculoskeletal: Negative for arthralgias, gait problem and myalgias. Skin: Positive for color change. Negative for rash. Neurological: Negative for syncope, light-headedness and numbness. Psychiatric/Behavioral: Negative for confusion. PAST MEDICAL HISTORY    has a past medical history of Anxiety, Depression, Diabetes mellitus ([address] Hyperlipidemia, and Hypertension. SURGICAL HISTORY      has a past surgical history that includes Abscess Drainage. CURRENT MEDICATIONS       Discharge Medication List as of 6/19/2022  1:01 PM      CONTINUE these medications which have NOT CHANGED    Details   umeclidinium-vilanterol (ANORO ELLIPTA) 62.5-25 MCG/INH AEPB inhaler Inhale 1 puff into the lungs daily, Disp-1 each, R-11Normal      predniSONE (DELTASONE) 10 MG tablet 4 tablets for 3 days, then 3 tablets for 3 days, then 2 tablets for 3 days, then 1 tablet for 3 days, Disp-30 tablet, R-0Normal      budesonide (PULMICORT) 0.5 MG/2ML nebulizer suspension Take 2 mLs by nebulization 2 times daily, Disp-60 each, R-3Normal      hydrALAZINE (APRESOLINE) 50 MG tablet Take 1 tablet by mouth every 8 hours, Disp-90 tablet, R-3Normal      nicotine (NICODERM CQ) 14 MG/24HR Place 1 patch onto the skin daily, Disp-30 patch, R-3Normal      !! albuterol (PROVENTIL) (2.5 MG/3ML) 0.083% nebulizer solution Take 3 mLs by nebulization every 4 hours as needed for Wheezing, Disp-120 each, R-3Normal      !! albuterol (PROVENTIL) (2.5 MG/3ML) 0.083% nebulizer solution Take 3 mLs by nebulization 2 times daily, Disp-120 each, R-3Normal      pregabalin (LYRICA) 100 MG capsule Take 200 mg by mouth 2 times daily. Historical Med      ibuprofen (ADVIL;MOTRIN) 600 MG tablet Take 1 tablet by mouth 3 times daily as needed for Pain, Disp-30 tablet, R-0Print      lidocaine (LIDODERM) 5 % Place 1 patch onto the skin daily 12 hours on, 12 hours off., Disp-30 patch, R-0Print      ondansetron (ZOFRAN ODT) 4 MG disintegrating tablet Take 1 tablet by mouth every 8 hours as needed for Nausea or Vomiting, Disp-6 tablet, R-0Normal      Lactobacillus (PROBIOTIC ACIDOPHILUS) CAPS Take 1 capsule by mouth daily, Disp-30 capsule, R-0Normal      metoprolol (LOPRESSOR) 100 MG tablet Take 1 tablet by mouth 2 times daily Hold sbp less 110 HR less than 55, Disp-180 tablet, R-3Normal      diltiazem (CARDIZEM) 30 MG tablet Take 1 tablet by mouth 3 times daily, Disp-270 tablet, R-3Normal      ivabradine (CORLANOR) 5 MG TABS tablet Take 1 tablet by mouth 2 times daily (with meals), Disp-180 tablet, R-3Normal      diclofenac sodium 1 % GEL Apply 2 g topically 2 times daily for 10 days, Topical, 2 TIMES DAILY Starting Wed 8/8/2018, Until Sat 8/18/2018, 20 doses, Disp-1 Tube, R-3, Print      amitriptyline (ELAVIL) 10 MG tablet Take 1 tablet by mouth nightly, Disp-30 tablet, R-4Normal      lisinopril (PRINIVIL;ZESTRIL) 10 MG tablet take 1 tablet by mouth once daily, Disp-30 tablet, R-1Normal      Blood Pressure Monitor KIT PRN Starting 3/1/2017, Until Discontinued, Disp-1 kit, R-0, Print      insulin glargine (LANTUS) 100 UNIT/ML injection vial Inject 30 Units into the skin 2 times daily, Disp-1 vial, R-3      insulin aspart (NOVOLOG) 100 UNIT/ML injection vial Inject 25 Units into the skin 3 times daily (before meals) Indications: Inject 25 units plus HPWO sliding scale three times a day      OLANZapine (ZYPREXA) 5 MG tablet Take 10 mg by mouth nightly Historical Med      atorvastatin (LIPITOR) 40 MG tablet Take 40 mg by mouth nightly Historical Med      aspirin 81 MG tablet Take 81 mg by mouth daily. gabapentin (NEURONTIN) 800 MG tablet Take 800 mg by mouth 3 times daily. glimepiride (AMARYL) 4 MG tablet Take 4 mg by mouth 2 times daily (with meals). metFORMIN (GLUCOPHAGE) 1000 MG tablet Take 2,000 mg by mouth 2 times daily (with meals) Historical Med       !! - Potential duplicate medications found. Please discuss with provider. ALLERGIES     is allergic to codeine.     FAMILY HISTORY     She indicated that her mother is alive. She indicated that her father is alive. She indicated that the status of her maternal aunt is unknown.   family history includes Depression in her mother; Diabetes in her maternal aunt; Heart Disease in her mother; High Blood Pressure in her maternal aunt and mother; High Cholesterol in her maternal aunt and mother; Stroke in her mother. SOCIAL HISTORY    reports that she quit smoking about 3 months ago. Her smoking use included cigarettes. She started smoking about 22 years ago. She has a 15.00 pack-year smoking history. She has never used smokeless tobacco. She reports that she does not drink alcohol and does not use drugs. PHYSICAL EXAM     INITIAL VITALS:  height is 5' (1.524 m) and weight is 270 lb (122.5 kg). Her oral temperature is 98.8 °F (37.1 °C). Her blood pressure is 126/76 and her pulse is 130 (abnormal). Her respiration is 18 and oxygen saturation is 92%. Physical Exam  Vitals and nursing note reviewed. Constitutional:       General: She is not in acute distress. Appearance: She is obese. She is not ill-appearing, toxic-appearing or diaphoretic. Cardiovascular:      Rate and Rhythm: Regular rhythm. Tachycardia present. Heart sounds: Normal heart sounds. Comments:  bpm  Pulmonary:      Effort: Pulmonary effort is normal. No respiratory distress. Breath sounds: Normal breath sounds. No stridor. No wheezing or rhonchi. Comments: Patient is receiving 4 L oxygen via nasal canula, states that this is the amount that she is on at home for COPD  Abdominal:      General: Abdomen is protuberant. Palpations: Abdomen is soft. Tenderness: There is no abdominal tenderness. Musculoskeletal:        Legs:    Skin:     General: Skin is warm and dry. Coloration: Skin is not jaundiced. Findings: Abscess and erythema present.       Comments: Erythema and edema of the mons pubis, left labia, and medial aspect of the upper thigh. Two dark purple-black areas of suspected tissue necrosis measuring  2.5 cm x 2 cm and 1 cm x 1 cm located on the left of the mons pubis where the patient's abdominal fat overlies. Clear fluid weeping from the mons pubis. Yellow serous fluid draining from the left labia. No drainage from the medial thigh. No bleeding. Left labia and medial thigh with induration. The left labia is tender to palpation, and warm to the touch. Mons pubis and medial left thigh are both warm to touch but non-tender. Area of fluctuance noted on the left of the mons pubis to the area of purple-black discoloration. Neurological:      Mental Status: She is alert and oriented to person, place, and time. Psychiatric:         Mood and Affect: Mood normal.         Behavior: Behavior normal.         Thought Content: Thought content normal.               DIFFERENTIAL DIAGNOSIS:   Including but not limited to: Cellulitis, abscess, uncontrolled diabetes, impending sepsis, dehydration, NF, tissue necrosis. DIAGNOSTIC RESULTS     EKG: All E[facility]'s are [facility] who either signs or Co-signs this chart in the absence of a cardiologist.  None     RADIOLOGY: non-plain film images(s) such as CT,Ultrasound and MRI are read by the radiologist.  Plain radiographic images are visualized and preliminarily interpreted by the emergency physician unless otherwise stated below.   No orders to display       LABS:   Labs Reviewed   CBC WITH AUTO DIFFERENTIAL - Abnormal; Notable for the following components:       Result Value    WBC 27.4 (*)     MCHC 31.8 (*)     RDW-SD 49.2 (*)     Segs Absolute 24.1 (*)     Lymphocytes Absolute 0.8 (*)     Monocytes Absolute 1.9 (*)     Immature Grans (Abs) 0.50 (*)     All other components within normal limits   COMPREHENSIVE METABOLIC PANEL - Abnormal; Notable for the following components:    Glucose 630 (*)     Sodium 121 (*)     Chloride 78 (*)     Alkaline Phosphatase 144 (*)     Albumin 2.3 (*)     All other components within normal limits   PROCALCITONIN - Abnormal; Notable for the following components:    Procalcitonin 0.83 (*)     All other components within normal limits   LACTATE, SEPSIS - Abnormal; Notable for the following components:    Lactic Acid, Sepsis 2.9 (*)     All other components within normal limits   LACTATE, SEPSIS - Abnormal; Notable for the following components:    Lactic Acid, Sepsis 2.3 (*)     All other components within normal limits   OSMOLALITY - Abnormal; Notable for the following components:    Osmolality Calc 273.3 (*)     All other components within normal limits   POCT GLUCOSE - Abnormal; Notable for the following components:    POC Glucose 480 (*)     All other components within normal limits   CULTURE, ANAEROBIC AND AEROBIC    Narrative:     Source: labia       Site: swab abs          Current Antibiotics: not stated   CULTURE, BLOOD 1    Narrative:     Source: blood-Adult-optimal 5.5-5.7oz/set volume       Site: IV start draw:          Current   Antibiotics: not stated   CULTURE, BLOOD 2    Narrative:     Source: blood-Adult-suboptimal <5.5oz./set volume       Site: Peripheral Vein            Current Antibiotics: not stated   ANION GAP   GLOMERULAR FILTRATION RATE, ESTIMATED   SCAN OF BLOOD SMEAR   POCT GLUCOSE   POCT GLUCOSE       EMERGENCY DEPARTMENT COURSE:   Vitals:    Vitals:    06/19/22 0916 06/19/22 1220   BP: (!) 155/80 126/76   Pulse: (!) 137 (!) 130   Resp: 18 18   Temp: 98.8 °F (37.1 °C)    TempSrc: Oral    SpO2: 92% 92%   Weight: 270 lb (122.5 kg)    Height: 5' (1.524 m)        Patient was seen in the emergency department during the global pandemic, when there was surge capacity and regional healthcare crisis. MDM:  This patient presented with a complaint of a left labia abscess.   Physical exam revealed large area of erythema, induration, and fluctuance to the pubic mons extending down the left labia and involving the left medial thigh.  There were 2 areas concerning for tissue necrosis. Vital signs reviewed and noted stable although tachycardic. Old records were reviewed. Based on patients history and physical exam she was further evaluated with labs. Also based on physical exam admission with surgical consult was discussed and highly advised to the patient. She refused this early on in her visit. Patient requested her abscess to be incised and drained here in the department. I advised her this would best be done by a surgeon how ever she continued to decline. Pertinent results revealed glucose of 630 and WBC of 27.4. Vital signs reviewed and noted tachycardia with HR of 138 bpm which patient states she has a history of tachycardia that she sees cardiology for. An area of fluctuance located on the left of the mons pubis between the two discolored areas was chosen for incision and drainage. Gas and malodorous red-brown discharge was expressed. Cultures were obtained. The incision was packed with iodoform gauze and covered with dry 4 x 4 gauze. (SEE PROCEDURE NOTE BELOW). Patient was given Norco, Toradol, insulin, vancomycin, and Cefazolin while in the ED. It was discussed with the patient that it would be in the best interest of her health to be admitted for IV antibiotics and surgical consult. Despite discussion with patient about reasons for and importance of admission and/or further testing, the patient refused admission and/or any further testing and wanted to leave against medical advice. The patient was alert and oriented times three, not altered or intoxicated in any fashion, and appeared capable of making informed medical decisions. I explained plainly to the patient the possible risks of leaving against medical advice, including worsening of medical condition that could result in their death, disability, or chronic vegetative state.  They verbalized understanding to these risks and accepted sole responsibility for leaving today. I explained to them, that although they were leaving against medical advice today, they should not hesitate to return to the emergency room at any time should they change their mind, need re-evaluation or desire further care. The patient stated that she would call and schedule an appointment with surgery to have her abscess evaluated and managed. She was prescribed Keflex and Bactrim, take as directed. No further questions or concerns. I have given the patient strict written and verbal instructions about care at home, follow-up, and signs and symptoms of worsening of condition and they did verbalize understanding. CRITICAL CARE:   None    CONSULTS:  None    PROCEDURES:  None  Incision/Drainage    Date/Time: 6/19/2022 1:01 PM  Performed by: Jessica Herndon PA-C  Authorized by: Jessica Herndon PA-C     Consent:     Consent obtained:  Verbal    Consent given by:  Patient    Risks discussed:  Bleeding, infection, incomplete drainage and pain    Alternatives discussed:  Delayed treatment, alternative treatment, referral and observation  Location:     Type:  Abscess    Location:  Anogenital (Mons pubis area)  Pre-procedure details:     Skin preparation:  Antiseptic wash  Anesthesia (see MAR for exact dosages): Anesthesia method:  Local infiltration    Local anesthetic:  Lidocaine 1% w/o epi  Procedure type:     Complexity:  Complex  Procedure details:     Needle aspiration: no      Incision types:  Stab incision and single straight    Incision depth:  Subcutaneous    Scalpel blade:  11    Wound management:  Probed and deloculated, irrigated with saline and extensive cleaning    Drainage:  Bloody and serosanguinous (gas)    Drainage amount:  Copious    Wound treatment:  Drain placed    Packing materials:  1/2 in iodoform gauze    Amount 1/2\" iodoform:  10 inches  Post-procedure details:     Patient tolerance of procedure:   Tolerated well, no immediate complications  Comments:      Local anesthetic, I&D, and packing performed by PA student Kishan Suarez with my direct supervision. FINAL IMPRESSION    1. Cellulitis and abscess of suprapubic region (Cellulitis and abscess of trunk)  2. Cellulitis of labia  3. Cellulitis of left lower extremity   4. Uncontrolled type 2 diabetes mellitus with hyperglycemia ([facility] [address]  5. Left against medical advice    DISPOSITION/PLAN   AMA    PATIENT REFERRED TO:  Dulce Amado MD  [address]   [place] [phone]    Schedule an appointment as soon as possible for a visit       ROBBIE Amaro - CNP  [address] [place] [address]    Schedule an appointment as soon as possible for a visit         DISCHARGE MEDICATIONS:  Discharge Medication List as of 6/19/2022  1:01 PM      START taking these medications    Details   cephALEXin (KEFLEX) 500 MG capsule Take 1 capsule by mouth 4 times daily for 10 days, Disp-40 capsule, R-0Print      sulfamethoxazole-trimethoprim (BACTRIM DS) 800-160 MG per tablet Take 1 tablet by mouth 2 times daily for 10 days, Disp-20 tablet, R-0Print             (Please note that portions of this note were completed with a voice recognition program.  Efforts were made to edit the dictations but occasionally words are mis-transcribed.)    Lazarus Ishihara, PA-C 06/21/22 11:36 AM    Lazarus Ishihara, PA-C Lazarus Ishihara, PA-C  06/21/22 [id number]

## 2022-06-19 NOTE — ED NOTES
Patient presents to the ED with complaint of having a boil on her labia. She has no other complaints at this time.       Nanda Selby, JONNY  12/58/63 [id number]

## 2022-06-21 ENCOUNTER — TELEPHONE (OUTPATIENT)
Dept: PULMONOLOGY | Age: 42
End: 2022-06-21

## 2022-06-21 ASSESSMENT — ENCOUNTER SYMPTOMS: COLOR CHANGE: 1

## 2022-06-21 NOTE — TELEPHONE ENCOUNTER
Racine from [facility] called about Donnas meds she said they help Tessy Guerrero with them. They prescribed her Flovent and they said it seems to be working well. They just wanted to know if she needed to be on the pulmicort. Please advise, thank you.

## 2022-06-22 LAB
AEROBIC CULTURE: NORMAL
ANAEROBIC CULTURE: NORMAL
GRAM STAIN RESULT: NORMAL

## 2022-06-22 NOTE — TELEPHONE ENCOUNTER
I told her that you didn't prescribe it but they wanted to know if she should be on it from a pulmonary stand point. I will let them know. Thank you.

## 2022-06-25 LAB
BLOOD CULTURE, ROUTINE: NORMAL
BLOOD CULTURE, ROUTINE: NORMAL

## 2022-07-13 ENCOUNTER — HOSPITAL ENCOUNTER (OUTPATIENT)
Age: 42
Discharge: HOME | End: 2022-08-03
Attending: INTERNAL MEDICINE | Admitting: INTERNAL MEDICINE
Payer: COMMERCIAL

## 2022-07-13 DIAGNOSIS — R29.898 WEAKNESS OF RIGHT UPPER EXTREMITY: ICD-10-CM

## 2022-07-13 DIAGNOSIS — R20.0 RIGHT UPPER EXTREMITY NUMBNESS: ICD-10-CM

## 2022-07-13 DIAGNOSIS — R53.1 RIGHT SIDED WEAKNESS: ICD-10-CM

## 2022-07-14 ENCOUNTER — TELEPHONE (OUTPATIENT)
Dept: PULMONOLOGY | Age: 42
End: 2022-07-14

## 2022-07-14 LAB
ALBUMIN SERPL-MCNC: 2.4 G/DL (ref 3.5–5.1)
ALP BLD-CCNC: 155 U/L (ref 38–126)
ALT SERPL-CCNC: 11 U/L (ref 11–66)
ANION GAP SERPL CALCULATED.3IONS-SCNC: 10 MEQ/L (ref 8–16)
AST SERPL-CCNC: 13 U/L (ref 5–40)
BILIRUB SERPL-MCNC: < 0.2 MG/DL (ref 0.3–1.2)
BUN BLDV-MCNC: 9 MG/DL (ref 7–22)
CALCIUM SERPL-MCNC: 8.5 MG/DL (ref 8.5–10.5)
CHLORIDE BLD-SCNC: 99 MEQ/L (ref 98–111)
CO2: 31 MEQ/L (ref 23–33)
CREAT SERPL-MCNC: 0.6 MG/DL (ref 0.4–1.2)
ERYTHROCYTE [DISTWIDTH] IN BLOOD BY AUTOMATED COUNT: 16.5 % (ref 11.5–14.5)
ERYTHROCYTE [DISTWIDTH] IN BLOOD BY AUTOMATED COUNT: 58.4 FL (ref 35–45)
GFR SERPL CREATININE-BSD FRML MDRD: > 90 ML/MIN/1.73M2
GLUCOSE BLD-MCNC: 117 MG/DL (ref 70–108)
HCT VFR BLD CALC: 30.9 % (ref 37–47)
HEMOGLOBIN: 8.8 GM/DL (ref 12–16)
MCH RBC QN AUTO: 28 PG (ref 26–33)
MCHC RBC AUTO-ENTMCNC: 28.5 GM/DL (ref 32.2–35.5)
MCV RBC AUTO: 98.4 FL (ref 81–99)
PLATELET # BLD: 468 THOU/MM3 (ref 130–400)
PMV BLD AUTO: 9 FL (ref 9.4–12.4)
POTASSIUM SERPL-SCNC: 4.5 MEQ/L (ref 3.5–5.2)
RBC # BLD: 3.14 MILL/MM3 (ref 4.2–5.4)
SCAN OF BLOOD SMEAR: NORMAL
SODIUM BLD-SCNC: 140 MEQ/L (ref 135–145)
TOTAL PROTEIN: 6 G/DL (ref 6.1–8)
TSH SERPL DL<=0.05 MIU/L-ACNC: 1.44 UIU/ML (ref 0.4–4.2)
WBC # BLD: 10 THOU/MM3 (ref 4.8–10.8)

## 2022-07-14 NOTE — TELEPHONE ENCOUNTER
Just an FYI patient is no longer wearing her cpap machine and the insurance is no longer going to pay. Letter attached to this message from [facility].

## 2022-07-18 LAB
AEROBIC CULTURE: NORMAL
ANION GAP SERPL CALCULATED.3IONS-SCNC: 11 MEQ/L (ref 8–16)
BUN BLDV-MCNC: 9 MG/DL (ref 7–22)
CALCIUM SERPL-MCNC: 9.3 MG/DL (ref 8.5–10.5)
CHLORIDE BLD-SCNC: 98 MEQ/L (ref 98–111)
CO2: 28 MEQ/L (ref 23–33)
CREAT SERPL-MCNC: 0.6 MG/DL (ref 0.4–1.2)
ERYTHROCYTE [DISTWIDTH] IN BLOOD BY AUTOMATED COUNT: 16.9 % (ref 11.5–14.5)
ERYTHROCYTE [DISTWIDTH] IN BLOOD BY AUTOMATED COUNT: 59.5 FL (ref 35–45)
GFR SERPL CREATININE-BSD FRML MDRD: > 90 ML/MIN/1.73M2
GLUCOSE BLD-MCNC: 124 MG/DL (ref 70–108)
HCT VFR BLD CALC: 33.2 % (ref 37–47)
HEMOGLOBIN: 9.6 GM/DL (ref 12–16)
MCH RBC QN AUTO: 27.9 PG (ref 26–33)
MCHC RBC AUTO-ENTMCNC: 28.9 GM/DL (ref 32.2–35.5)
MCV RBC AUTO: 96.5 FL (ref 81–99)
PLATELET # BLD: 496 THOU/MM3 (ref 130–400)
PMV BLD AUTO: 8.8 FL (ref 9.4–12.4)
POTASSIUM SERPL-SCNC: 3.8 MEQ/L (ref 3.5–5.2)
RBC # BLD: 3.44 MILL/MM3 (ref 4.2–5.4)
SODIUM BLD-SCNC: 137 MEQ/L (ref 135–145)
WBC # BLD: 9.9 THOU/MM3 (ref 4.8–10.8)

## 2022-07-21 LAB
ALBUMIN SERPL-MCNC: 2.7 G/DL (ref 3.5–5.1)
ALP BLD-CCNC: 133 U/L (ref 38–126)
ALT SERPL-CCNC: 17 U/L (ref 11–66)
AST SERPL-CCNC: 25 U/L (ref 5–40)
BILIRUB SERPL-MCNC: < 0.2 MG/DL (ref 0.3–1.2)
BILIRUBIN DIRECT: < 0.2 MG/DL (ref 0–0.3)
TOTAL PROTEIN: 6.8 G/DL (ref 6.1–8)

## 2022-07-23 LAB
ANION GAP SERPL CALCULATED.3IONS-SCNC: 12 MEQ/L (ref 8–16)
BASOPHILS # BLD: 0.3 %
BASOPHILS ABSOLUTE: 0 THOU/MM3 (ref 0–0.1)
BUN BLDV-MCNC: 10 MG/DL (ref 7–22)
CALCIUM SERPL-MCNC: 9.6 MG/DL (ref 8.5–10.5)
CHLORIDE BLD-SCNC: 96 MEQ/L (ref 98–111)
CO2: 27 MEQ/L (ref 23–33)
CREAT SERPL-MCNC: 0.5 MG/DL (ref 0.4–1.2)
EOSINOPHIL # BLD: 7.8 %
EOSINOPHILS ABSOLUTE: 0.7 THOU/MM3 (ref 0–0.4)
ERYTHROCYTE [DISTWIDTH] IN BLOOD BY AUTOMATED COUNT: 15.9 % (ref 11.5–14.5)
ERYTHROCYTE [DISTWIDTH] IN BLOOD BY AUTOMATED COUNT: 54.5 FL (ref 35–45)
GFR SERPL CREATININE-BSD FRML MDRD: > 90 ML/MIN/1.73M2
GLUCOSE BLD-MCNC: 150 MG/DL (ref 70–108)
HCT VFR BLD CALC: 32.5 % (ref 37–47)
HEMOGLOBIN: 9.7 GM/DL (ref 12–16)
IMMATURE GRANS (ABS): 0.04 THOU/MM3 (ref 0–0.07)
IMMATURE GRANULOCYTES: 0.4 %
LYMPHOCYTES # BLD: 32.7 %
LYMPHOCYTES ABSOLUTE: 3 THOU/MM3 (ref 1–4.8)
MCH RBC QN AUTO: 28.4 PG (ref 26–33)
MCHC RBC AUTO-ENTMCNC: 29.8 GM/DL (ref 32.2–35.5)
MCV RBC AUTO: 95 FL (ref 81–99)
MONOCYTES # BLD: 7.3 %
MONOCYTES ABSOLUTE: 0.7 THOU/MM3 (ref 0.4–1.3)
NUCLEATED RED BLOOD CELLS: 0 /100 WBC
PLATELET # BLD: 444 THOU/MM3 (ref 130–400)
PMV BLD AUTO: 8.8 FL (ref 9.4–12.4)
POTASSIUM SERPL-SCNC: 4.1 MEQ/L (ref 3.5–5.2)
RBC # BLD: 3.42 MILL/MM3 (ref 4.2–5.4)
SEG NEUTROPHILS: 51.5 %
SEGMENTED NEUTROPHILS ABSOLUTE COUNT: 4.8 THOU/MM3 (ref 1.8–7.7)
SODIUM BLD-SCNC: 135 MEQ/L (ref 135–145)
WBC # BLD: 9.3 THOU/MM3 (ref 4.8–10.8)

## 2022-07-27 LAB
ALBUMIN SERPL-MCNC: 3.1 G/DL (ref 3.5–5.1)
ALP BLD-CCNC: 117 U/L (ref 38–126)
ALT SERPL-CCNC: 17 U/L (ref 11–66)
ANION GAP SERPL CALCULATED.3IONS-SCNC: 12 MEQ/L (ref 8–16)
AST SERPL-CCNC: 18 U/L (ref 5–40)
BASOPHILS # BLD: 0.5 %
BASOPHILS ABSOLUTE: 0 THOU/MM3 (ref 0–0.1)
BILIRUB SERPL-MCNC: < 0.2 MG/DL (ref 0.3–1.2)
BUN BLDV-MCNC: 10 MG/DL (ref 7–22)
CALCIUM SERPL-MCNC: 9.7 MG/DL (ref 8.5–10.5)
CHLORIDE BLD-SCNC: 99 MEQ/L (ref 98–111)
CO2: 27 MEQ/L (ref 23–33)
CREAT SERPL-MCNC: 0.5 MG/DL (ref 0.4–1.2)
EOSINOPHIL # BLD: 6.5 %
EOSINOPHILS ABSOLUTE: 0.6 THOU/MM3 (ref 0–0.4)
ERYTHROCYTE [DISTWIDTH] IN BLOOD BY AUTOMATED COUNT: 14.9 % (ref 11.5–14.5)
ERYTHROCYTE [DISTWIDTH] IN BLOOD BY AUTOMATED COUNT: 49.7 FL (ref 35–45)
GFR SERPL CREATININE-BSD FRML MDRD: > 90 ML/MIN/1.73M2
GLUCOSE BLD-MCNC: 149 MG/DL (ref 70–108)
HCT VFR BLD CALC: 34.2 % (ref 37–47)
HEMOGLOBIN: 10.5 GM/DL (ref 12–16)
IMMATURE GRANS (ABS): 0.03 THOU/MM3 (ref 0–0.07)
IMMATURE GRANULOCYTES: 0.3 %
LYMPHOCYTES # BLD: 29.8 %
LYMPHOCYTES ABSOLUTE: 2.7 THOU/MM3 (ref 1–4.8)
MCH RBC QN AUTO: 28.2 PG (ref 26–33)
MCHC RBC AUTO-ENTMCNC: 30.7 GM/DL (ref 32.2–35.5)
MCV RBC AUTO: 91.7 FL (ref 81–99)
MONOCYTES # BLD: 8.6 %
MONOCYTES ABSOLUTE: 0.8 THOU/MM3 (ref 0.4–1.3)
NUCLEATED RED BLOOD CELLS: 0 /100 WBC
PLATELET # BLD: 427 THOU/MM3 (ref 130–400)
PMV BLD AUTO: 8.7 FL (ref 9.4–12.4)
POTASSIUM SERPL-SCNC: 4.3 MEQ/L (ref 3.5–5.2)
RBC # BLD: 3.73 MILL/MM3 (ref 4.2–5.4)
SEG NEUTROPHILS: 54.3 %
SEGMENTED NEUTROPHILS ABSOLUTE COUNT: 5 THOU/MM3 (ref 1.8–7.7)
SODIUM BLD-SCNC: 138 MEQ/L (ref 135–145)
TOTAL PROTEIN: 7.1 G/DL (ref 6.1–8)
WBC # BLD: 9.2 THOU/MM3 (ref 4.8–10.8)

## 2022-07-28 LAB — PREALBUMIN: 23.7 MG/DL (ref 20–40)

## 2022-07-29 ENCOUNTER — APPOINTMENT (OUTPATIENT)
Dept: CT IMAGING | Age: 42
End: 2022-07-29
Attending: INTERNAL MEDICINE
Payer: COMMERCIAL

## 2022-07-29 ENCOUNTER — APPOINTMENT (OUTPATIENT)
Dept: GENERAL RADIOLOGY | Age: 42
End: 2022-07-29
Attending: INTERNAL MEDICINE
Payer: COMMERCIAL

## 2022-07-29 PROCEDURE — 73030 X-RAY EXAM OF SHOULDER: CPT

## 2022-07-29 PROCEDURE — 70450 CT HEAD/BRAIN W/O DYE: CPT

## 2022-07-31 LAB
ALBUMIN SERPL-MCNC: 3.3 G/DL (ref 3.5–5.1)
ALP BLD-CCNC: 119 U/L (ref 38–126)
ALT SERPL-CCNC: 17 U/L (ref 11–66)
ANION GAP SERPL CALCULATED.3IONS-SCNC: 13 MEQ/L (ref 8–16)
AST SERPL-CCNC: 15 U/L (ref 5–40)
BASOPHILS # BLD: 1.2 %
BASOPHILS ABSOLUTE: 0.1 THOU/MM3 (ref 0–0.1)
BILIRUB SERPL-MCNC: < 0.2 MG/DL (ref 0.3–1.2)
BUN BLDV-MCNC: 16 MG/DL (ref 7–22)
CALCIUM SERPL-MCNC: 9.9 MG/DL (ref 8.5–10.5)
CHLORIDE BLD-SCNC: 97 MEQ/L (ref 98–111)
CO2: 25 MEQ/L (ref 23–33)
CREAT SERPL-MCNC: 0.5 MG/DL (ref 0.4–1.2)
EOSINOPHIL # BLD: 4.8 %
EOSINOPHILS ABSOLUTE: 0.5 THOU/MM3 (ref 0–0.4)
ERYTHROCYTE [DISTWIDTH] IN BLOOD BY AUTOMATED COUNT: 14.6 % (ref 11.5–14.5)
ERYTHROCYTE [DISTWIDTH] IN BLOOD BY AUTOMATED COUNT: 47.7 FL (ref 35–45)
GFR SERPL CREATININE-BSD FRML MDRD: > 90 ML/MIN/1.73M2
GLUCOSE BLD-MCNC: 164 MG/DL (ref 70–108)
HCT VFR BLD CALC: 34.8 % (ref 37–47)
HEMOGLOBIN: 10.8 GM/DL (ref 12–16)
IMMATURE GRANS (ABS): 0.04 THOU/MM3 (ref 0–0.07)
IMMATURE GRANULOCYTES: 0.4 %
LYMPHOCYTES # BLD: 33.3 %
LYMPHOCYTES ABSOLUTE: 3.1 THOU/MM3 (ref 1–4.8)
MCH RBC QN AUTO: 27.8 PG (ref 26–33)
MCHC RBC AUTO-ENTMCNC: 31 GM/DL (ref 32.2–35.5)
MCV RBC AUTO: 89.7 FL (ref 81–99)
MONOCYTES # BLD: 8.8 %
MONOCYTES ABSOLUTE: 0.8 THOU/MM3 (ref 0.4–1.3)
NUCLEATED RED BLOOD CELLS: 0 /100 WBC
PLATELET # BLD: 489 THOU/MM3 (ref 130–400)
PMV BLD AUTO: 9 FL (ref 9.4–12.4)
POTASSIUM SERPL-SCNC: 4.5 MEQ/L (ref 3.5–5.2)
RBC # BLD: 3.88 MILL/MM3 (ref 4.2–5.4)
SEG NEUTROPHILS: 51.5 %
SEGMENTED NEUTROPHILS ABSOLUTE COUNT: 4.8 THOU/MM3 (ref 1.8–7.7)
SODIUM BLD-SCNC: 135 MEQ/L (ref 135–145)
TOTAL PROTEIN: 7.2 G/DL (ref 6.1–8)
WBC # BLD: 9.4 THOU/MM3 (ref 4.8–10.8)

## 2022-08-01 ENCOUNTER — APPOINTMENT (OUTPATIENT)
Dept: MRI IMAGING | Age: 42
End: 2022-08-01
Attending: INTERNAL MEDICINE
Payer: COMMERCIAL

## 2022-08-01 LAB
ALBUMIN SERPL-MCNC: 3.3 G/DL (ref 3.5–5.1)
ALP BLD-CCNC: 121 U/L (ref 38–126)
ALT SERPL-CCNC: 18 U/L (ref 11–66)
ANION GAP SERPL CALCULATED.3IONS-SCNC: 11 MEQ/L (ref 8–16)
AST SERPL-CCNC: 21 U/L (ref 5–40)
BASOPHILS # BLD: 0.9 %
BASOPHILS ABSOLUTE: 0.1 THOU/MM3 (ref 0–0.1)
BILIRUB SERPL-MCNC: < 0.2 MG/DL (ref 0.3–1.2)
BILIRUBIN DIRECT: < 0.2 MG/DL (ref 0–0.3)
BUN BLDV-MCNC: 20 MG/DL (ref 7–22)
CALCIUM SERPL-MCNC: 9.8 MG/DL (ref 8.5–10.5)
CHLORIDE BLD-SCNC: 98 MEQ/L (ref 98–111)
CO2: 27 MEQ/L (ref 23–33)
CREAT SERPL-MCNC: 0.5 MG/DL (ref 0.4–1.2)
EOSINOPHIL # BLD: 5 %
EOSINOPHILS ABSOLUTE: 0.5 THOU/MM3 (ref 0–0.4)
ERYTHROCYTE [DISTWIDTH] IN BLOOD BY AUTOMATED COUNT: 14.5 % (ref 11.5–14.5)
ERYTHROCYTE [DISTWIDTH] IN BLOOD BY AUTOMATED COUNT: 48.8 FL (ref 35–45)
GFR SERPL CREATININE-BSD FRML MDRD: > 90 ML/MIN/1.73M2
GLUCOSE BLD-MCNC: 187 MG/DL (ref 70–108)
HCT VFR BLD CALC: 36.3 % (ref 37–47)
HEMOGLOBIN: 11 GM/DL (ref 12–16)
IMMATURE GRANS (ABS): 0.04 THOU/MM3 (ref 0–0.07)
IMMATURE GRANULOCYTES: 0.4 %
LYMPHOCYTES # BLD: 38.6 %
LYMPHOCYTES ABSOLUTE: 4 THOU/MM3 (ref 1–4.8)
MCH RBC QN AUTO: 28 PG (ref 26–33)
MCHC RBC AUTO-ENTMCNC: 30.3 GM/DL (ref 32.2–35.5)
MCV RBC AUTO: 92.4 FL (ref 81–99)
MONOCYTES # BLD: 9.2 %
MONOCYTES ABSOLUTE: 0.9 THOU/MM3 (ref 0.4–1.3)
NUCLEATED RED BLOOD CELLS: 0 /100 WBC
PLATELET # BLD: 451 THOU/MM3 (ref 130–400)
PMV BLD AUTO: 9 FL (ref 9.4–12.4)
POTASSIUM SERPL-SCNC: 5.2 MEQ/L (ref 3.5–5.2)
RBC # BLD: 3.93 MILL/MM3 (ref 4.2–5.4)
SEG NEUTROPHILS: 45.9 %
SEGMENTED NEUTROPHILS ABSOLUTE COUNT: 4.7 THOU/MM3 (ref 1.8–7.7)
SODIUM BLD-SCNC: 136 MEQ/L (ref 135–145)
TOTAL PROTEIN: 7.2 G/DL (ref 6.1–8)
WBC # BLD: 10.3 THOU/MM3 (ref 4.8–10.8)

## 2022-08-01 PROCEDURE — 72141 MRI NECK SPINE W/O DYE: CPT

## 2022-08-01 PROCEDURE — 70551 MRI BRAIN STEM W/O DYE: CPT

## 2023-01-04 ENCOUNTER — HOSPITAL ENCOUNTER (OUTPATIENT)
Dept: GENERAL RADIOLOGY | Age: 43
Discharge: HOME | End: 2023-01-04

## 2023-01-04 ENCOUNTER — HOSPITAL ENCOUNTER (OUTPATIENT)
Dept: CT IMAGING | Age: 43
Discharge: HOME | End: 2023-01-04

## 2023-01-04 DIAGNOSIS — Z00.6 ENCOUNTER FOR EXAMINATION FOR NORMAL COMPARISON AND CONTROL IN CLINICAL RESEARCH PROGRAM: ICD-10-CM

## 2023-03-01 ENCOUNTER — OFFICE VISIT (OUTPATIENT)
Dept: PHYSICAL MEDICINE AND REHAB | Age: 43
End: 2023-03-01
Payer: COMMERCIAL

## 2023-03-01 VITALS
BODY MASS INDEX: 50.98 KG/M2 | SYSTOLIC BLOOD PRESSURE: 142 MMHG | DIASTOLIC BLOOD PRESSURE: 72 MMHG | WEIGHT: 270 LBS | HEIGHT: 61 IN

## 2023-03-01 DIAGNOSIS — S72.451D: ICD-10-CM

## 2023-03-01 DIAGNOSIS — G62.9 NEUROPATHY: ICD-10-CM

## 2023-03-01 DIAGNOSIS — G89.29 CHRONIC PAIN OF RIGHT KNEE: Primary | ICD-10-CM

## 2023-03-01 DIAGNOSIS — M25.561 CHRONIC PAIN OF RIGHT KNEE: Primary | ICD-10-CM

## 2023-03-01 DIAGNOSIS — S72.491D CLOSED COMMINUTED INTRA-ARTICULAR FRACTURE OF DISTAL FEMUR, RIGHT, WITH ROUTINE HEALING, SUBSEQUENT ENCOUNTER: ICD-10-CM

## 2023-03-01 DIAGNOSIS — G89.4 CHRONIC PAIN SYNDROME: ICD-10-CM

## 2023-03-01 PROCEDURE — 99205 OFFICE O/P NEW HI 60 MIN: CPT | Performed by: NURSE PRACTITIONER

## 2023-03-01 PROCEDURE — G8417 CALC BMI ABV UP PARAM F/U: HCPCS | Performed by: NURSE PRACTITIONER

## 2023-03-01 PROCEDURE — 1036F TOBACCO NON-USER: CPT | Performed by: NURSE PRACTITIONER

## 2023-03-01 PROCEDURE — G8484 FLU IMMUNIZE NO ADMIN: HCPCS | Performed by: NURSE PRACTITIONER

## 2023-03-01 PROCEDURE — G8427 DOCREV CUR MEDS BY ELIG CLIN: HCPCS | Performed by: NURSE PRACTITIONER

## 2023-03-01 PROCEDURE — 3078F DIAST BP <80 MM HG: CPT | Performed by: NURSE PRACTITIONER

## 2023-03-01 PROCEDURE — 3077F SYST BP >= 140 MM HG: CPT | Performed by: NURSE PRACTITIONER

## 2023-03-01 RX ORDER — GLIPIZIDE 5 MG/1
TABLET ORAL
COMMUNITY
Start: 2023-02-13

## 2023-03-01 RX ORDER — ARIPIPRAZOLE 10 MG/1
TABLET ORAL
COMMUNITY
Start: 2023-02-13

## 2023-03-01 RX ORDER — HYDROXYZINE HYDROCHLORIDE 25 MG/1
25 TABLET, FILM COATED ORAL EVERY 8 HOURS PRN
COMMUNITY

## 2023-03-01 RX ORDER — BUSPIRONE HYDROCHLORIDE 15 MG/1
TABLET ORAL
COMMUNITY
Start: 2023-02-13

## 2023-03-01 RX ORDER — OXYCODONE HYDROCHLORIDE 5 MG/1
TABLET ORAL
COMMUNITY
Start: 2023-02-11

## 2023-03-01 RX ORDER — NAPROXEN 500 MG/1
500 TABLET ORAL 2 TIMES DAILY WITH MEALS
Qty: 60 TABLET | Refills: 0 | Status: SHIPPED | OUTPATIENT
Start: 2023-03-01

## 2023-03-01 RX ORDER — CYCLOBENZAPRINE HCL 10 MG
TABLET ORAL
COMMUNITY
Start: 2022-12-29

## 2023-03-01 RX ORDER — CALCIUM CARB/VITAMIN D3/VIT K1 500-100-40
TABLET,CHEWABLE ORAL 2 TIMES DAILY
COMMUNITY

## 2023-03-01 RX ORDER — BUDESONIDE AND FORMOTEROL FUMARATE DIHYDRATE 160; 4.5 UG/1; UG/1
AEROSOL RESPIRATORY (INHALATION)
COMMUNITY
Start: 2023-02-13

## 2023-03-01 RX ORDER — POLYETHYLENE GLYCOL 3350 17 G/17G
17 POWDER, FOR SOLUTION ORAL DAILY PRN
COMMUNITY

## 2023-03-01 RX ORDER — IPRATROPIUM BROMIDE AND ALBUTEROL SULFATE 2.5; .5 MG/3ML; MG/3ML
1 SOLUTION RESPIRATORY (INHALATION) EVERY 4 HOURS
COMMUNITY

## 2023-03-01 RX ORDER — ACETAMINOPHEN 500 MG
500 TABLET ORAL EVERY 6 HOURS PRN
COMMUNITY

## 2023-03-01 RX ORDER — FLUOXETINE HYDROCHLORIDE 20 MG/1
CAPSULE ORAL
COMMUNITY
Start: 2023-02-13

## 2023-03-01 RX ORDER — OXCARBAZEPINE 600 MG/1
TABLET, FILM COATED ORAL
COMMUNITY
Start: 2023-02-13

## 2023-03-01 RX ORDER — TRAMADOL HYDROCHLORIDE 50 MG/1
50 TABLET ORAL DAILY PRN
COMMUNITY

## 2023-03-01 NOTE — PROGRESS NOTES
Chronic Pain/PM&R Clinic Note     Encounter Date: 3/1/23    Subjective:   Chief Complaint:   Chief Complaint   Patient presents with    Leg Pain     Pt. Reports right leg pain. History of Present Illness:   Sarah Lubin is a 40 y.o. female seen in the clinic initially on 03/01/2023 upon request from ROBBIE Mir for her history of right leg pain. She has a personal medical history of hypertension, palpitations, gastric enteritis, diabetes type 2, COPD, depression, anxiety, bipolar, polyneuropathy, obesity. Patient presents today with a walker, with complaints of right leg pain. She stated is from her right knee to the front of her shin. She states this pain has been occurring for about 8 months. She states she had a fall in June, where she was smoking outside and when she went inside step tripped on her step and fell. She reports she laid there for few hours and her manager found her, then was sent to the hospital where she did have a repair. At that time she also did have a boil that was \" in my private area\" and they did surgery for that at that time as well. She also had a diverting colostomy put in place at that time. She states she then went to [place], and then was discharged to [address] [place] [address] for rehab. She states she was discharged from there in December 2022. She reports that she also recently had her colostomy removed and closed 2/6/2023. She describes her pain as a constant \" bad ache\", with occasional sharpness. She reports that pain is worsened by bending turning, walking, straightening the knee, standing on it. She states she does use a walker to help her get around at home and when she is out. She states pain is somewhat alleviated by moving it but it does not last.  She states she has tried ice with no relief. She has not tried any heat, topicals. She denies any numbness or tingling.   She does have a history of bilateral peripheral neuropathy in her feet, but states this has been occurring for years. She states she does feel little weak in that right leg/knee, but denies any recent falls. She states pain is same throughout the day. She denies any alcohol use, THC use, illicit drug use. Pain scale : at worst pain is 7/10, at best pain is 5/10.        History of Interventions:   Surgery: Right supracondylar femur fracture with fixation/hever (6/20/2022)  Injections: None    Current Treatment Medications:   Ibuprofen - no relief  Elavil - unsure   Neurontin 800mg TID - no relief   Tylenol - no relief    Historical Treatment Medications:   Norco - relief  Tramadol - no relief  Lyrica - no relief  Oxycodone - relief     Imaging:      Past Medical History:   Diagnosis Date    Anxiety     Depression     Diabetes mellitus ([facility] [address]     Hyperlipidemia     Hypertension        Past Surgical History:   Procedure Laterality Date    ABSCESS DRAINAGE      COLOSTOMY CLOSURE  02/06/2023       Family History   Problem Relation Age of Onset    High Blood Pressure Mother     Heart Disease Mother     High Cholesterol Mother     Depression Mother     Stroke Mother     Diabetes Maternal Aunt     High Blood Pressure Maternal Aunt     High Cholesterol Maternal Aunt          Medications & Allergies:   Current Outpatient Medications   Medication Instructions    acetaminophen (TYLENOL) 500 mg, Oral, EVERY 6 HOURS PRN    albuterol (PROVENTIL) 2.5 mg, Nebulization, EVERY 4 HOURS PRN    albuterol (PROVENTIL) 2.5 mg, Nebulization, 2 TIMES DAILY    ARIPiprazole (ABILIFY) 10 MG tablet take ONE tablet BY MOUTH ONCE DAILY    aspirin 81 mg, DAILY    atorvastatin (LIPITOR) 40 mg, Oral, NIGHTLY    Blood Pressure Monitor KIT 1 kit, Does not apply, PRN    busPIRone (BUSPAR) 15 MG tablet take ONE tablet BY MOUTH THREE TIMES DAILY    cyclobenzaprine (FLEXERIL) 10 MG tablet take ONE tablet BY MOUTH THREE TIMES DAILY AS NEEDED    diclofenac sodium (VOLTAREN) 2 g, Topical, 2 TIMES DAILY    diclofenac sodium (VOLTAREN) 4 g, Topical, 4 TIMES DAILY PRN    FLUoxetine (PROZAC) 20 MG capsule take ONE capsule BY MOUTH ONCE DAILY    gabapentin (NEURONTIN) 800 mg, 3 TIMES DAILY    glipiZIDE (GLUCOTROL) 5 MG tablet TAKE 1 TABLET BY MOUTH ONCE DAILY WITH FOOD    hydrOXYzine HCl (ATARAX) 25 mg, Oral, EVERY 8 HOURS PRN    insulin glargine (LANTUS) 30 Units, SubCUTAneous, 2 TIMES DAILY    ipratropium-albuterol (DUONEB) 0.5-2.5 (3) MG/3ML SOLN nebulizer solution 1 vial, Inhalation, EVERY 4 HOURS    lisinopril (PRINIVIL;ZESTRIL) 10 MG tablet take 1 tablet by mouth once daily    metFORMIN (GLUCOPHAGE) 2,000 mg, Oral, 2 TIMES DAILY WITH MEALS    metoprolol (LOPRESSOR) 100 mg, Oral, 2 TIMES DAILY, Hold sbp less 110 HR less than 55    naproxen (NAPROSYN) 500 mg, Oral, 2 TIMES DAILY WITH MEALS    OXcarbazepine (TRILEPTAL) 600 MG tablet TAKE 1 TABLET BY MOUTH TWICE DAILY    oxyCODONE (ROXICODONE) 5 MG immediate release tablet take 1 tablet by mouth every 6 hours if needed for pain    polyethylene glycol (GLYCOLAX) 17 g, Oral, DAILY PRN    SYMBICORT 160-4.5 MCG/ACT AERO INHALE TWO PUFFS BY MOUTH TWICE DAILY    tolnaftate (TINACTIN) 1 % spray Topical, 2 TIMES DAILY, Apply topically 2 times daily. traMADol (ULTRAM) 50 mg, Oral, DAILY PRN       Allergies   Allergen Reactions    Codeine Hives       Review of Systems:   Constitutional: negative for weight changes or fevers  Genitourinary: negative for bowel/bladder incontinence   Musculoskeletal: positive for right knee pain  Neurological: positive for right leg/knee weakness.   Behavioral/Psych: negative for anxiety/depression   All other systems reviewed and are negative    Objective:     Vitals:    03/01/23 1233   BP: (!) 142/72       Constitutional: Pleasant, no acute distress   Head: Normocephalic, atraumatic   Eyes: Conjunctivae normal   Neck: Supple, symmetrical   Lungs: Normal respiratory effort, non-labored breathing   Cardiovascular: Limbs warm and well perfused   Abdomen: Non-protruded   Musculoskeletal: Muscle bulk symmetric, no atrophy, no gross deformities   · Lower Extremities: ROM WNL. · Thorax: No paraspinal tenderness bilaterally. No scoliosis or kyphosis. · Lumbar Spine: ROM WNL. Lumbar paraspinals non-tender to palpation bilaterally. · Right knee: ROM WNL. Tender with palpation through joint line and anterior shin. Neurological: Cranial nerves II-XII grossly intact. · Gait - antalgic gait. Ambulates with assistive device- cane  · Motor: 4/5 muscle strength in right hip flexion, knee flexion, knee extension, ankle dorsiflexion, and ankle plantar flexion   · Sensory: LT sensation intact in lower limbs   Skin: No rashes or lesions present   Psychological: Cooperative, no exaggerated pain behaviors       Assessment:    Diagnosis Orders   1. Chronic pain of right knee  XR KNEE RIGHT (MIN 4 VIEWS)      2. Closed comminuted intra-articular fracture of distal femur, right, with routine healing, subsequent encounter        3. Closed displaced supracondylar fracture without intracondylar extension of lower end of right femur with routine healing        4. Chronic pain syndrome        5. Neuropathy             Nilda Bose is a 40 y. o.female presenting to the pain clinic for evaluation of right leg/knee pain. Did discuss with her updating right knee images as I have no x-rays on file for this, she is agreeable to this. Also discussed trialing naproxen 500 mg twice daily as needed, and Voltaren topically 4 times daily as needed. She is agreeable to this. I have sent the prescription to her pharmacy. We did discuss pending x-ray results, possibly trialing steroid injection versus genicular nerve block in the future. She is educated to continue to use ice, heat, over-the-counter's, work on exercises and strengthening. I will see her back in 4 weeks to reevaluate. Plan:    The following treatment recommendations and plan were discussed in detail with Iraj Marie Smith.    Imaging:   I have reviewed patients imaging of right femur x-ray, pelvic x-ray, CT thoracic and lumbar-  And results were discussed with patient today. Analgesics: The patient is currently managing their pain with the use of Neurontin which is prescribed by primary care provider. We recommend that the patient follow the recommendation of the prescribing provider with regard to the above medication(s) and contact their prescribing provider for refills if needed. The side effect profile of long-term opioid use was discussed and recommended emphasis on multimodal strategies for pain management. Patient is taking Acetaminophen. Patient informed that the maximum amount of acetaminophen taken on a regular basis should only be 4000 mg per day. Adjuvants: For continued chronic pain with associated musculoskeletal component, the patient is advised to start naproxen 500 mg twice daily as needed, Voltaren 4 times daily as needed. Patient is advised to take as prescribed and not take on an empty stomach. Interventions:   None    Procedure educations:  Discussed with patient about risks with procedure including infection, reaction to medication, increased pain, worsening of symptoms, or bleeding. Anticoagulation/NPO Recommendations:   None    Multidisciplinary Pain Management:   In the presence of complex, chronic, and multi-factorial pain, the importance of a multidisciplinary approach to pain management in the patients management regimen was emphasized and discussed in great detail. PHYSICAL THERAPY: Patient is advised to see a physical therapist for gentle stretching exercises and conditioning exercises for management of pain.    PSYCHOLOGY: Patient is advised to see a clinical pain psychologist, for the psychosocial aspect of pain care through coping skills, relaxation strategies, cognitive group therapy etc.   OBESITY: Patient is advised to seek nutrition consult to help in managing their weight to decrease its impact on pain. The patient was also advised to continue physical therapy and stretching exercises at home and cognitive behavioral and/or group therapy. Referrals:  Right knee x-ray    Prescriptions Written This Visit:   Naproxen 500 mg twice daily as needed  Voltaren 4 times daily as needed    Follow-up:   4 weeks    It was my pleasure to evaluate Sarah Lubin today. I spent over 60 minutes evaluating this patient, reviewing previous notes and images and completing documentation.        ROBBIE Pendleton - CNP   Interventional Pain Management/PM&R   [place]

## 2023-03-30 ENCOUNTER — HOSPITAL ENCOUNTER (OUTPATIENT)
Age: 43
Setting detail: SPECIMEN
Discharge: HOME | End: 2023-03-30

## 2023-03-30 LAB
ABSOLUTE EOS #: 0.44 K/UL (ref 0–0.44)
ABSOLUTE IMMATURE GRANULOCYTE: 0.1 K/UL (ref 0–0.3)
ABSOLUTE LYMPH #: 4.79 K/UL (ref 1.1–3.7)
ABSOLUTE MONO #: 0.98 K/UL (ref 0.1–1.2)
BASOPHILS # BLD: 1 % (ref 0–2)
BASOPHILS ABSOLUTE: 0.11 K/UL (ref 0–0.2)
EOSINOPHILS RELATIVE PERCENT: 3 % (ref 1–4)
HCT VFR BLD AUTO: 41.5 % (ref 36.3–47.1)
HGB BLD-MCNC: 12.7 G/DL (ref 11.9–15.1)
IMMATURE GRANULOCYTES: 1 %
LYMPHOCYTES # BLD: 28 % (ref 24–43)
MCH RBC QN AUTO: 28.4 PG (ref 25.2–33.5)
MCHC RBC AUTO-ENTMCNC: 30.6 G/DL (ref 28.4–34.8)
MCV RBC AUTO: 92.8 FL (ref 82.6–102.9)
MONOCYTES # BLD: 6 % (ref 3–12)
NRBC AUTOMATED: 0 PER 100 WBC
PDW BLD-RTO: 13.4 % (ref 11.8–14.4)
PLATELET # BLD AUTO: 550 K/UL (ref 138–453)
PMV BLD AUTO: 9.7 FL (ref 8.1–13.5)
RBC # BLD: 4.47 M/UL (ref 3.95–5.11)
SEG NEUTROPHILS: 61 % (ref 36–65)
SEGMENTED NEUTROPHILS ABSOLUTE COUNT: 10.98 K/UL (ref 1.5–8.1)
WBC # BLD AUTO: 17.4 K/UL (ref 3.5–11.3)

## 2023-03-31 LAB
ALBUMIN SERPL-MCNC: 4 G/DL (ref 3.5–5.2)
ALBUMIN/GLOBULIN RATIO: 1 (ref 1–2.5)
ALP SERPL-CCNC: 110 U/L (ref 35–104)
ALT SERPL-CCNC: 15 U/L (ref 5–33)
ANION GAP SERPL CALCULATED.3IONS-SCNC: 22 MMOL/L (ref 9–17)
AST SERPL-CCNC: 16 U/L
BILIRUB SERPL-MCNC: 0.2 MG/DL (ref 0.3–1.2)
BUN SERPL-MCNC: 17 MG/DL (ref 6–20)
CALCIUM SERPL-MCNC: 10.5 MG/DL (ref 8.6–10.4)
CHLORIDE SERPL-SCNC: 100 MMOL/L (ref 98–107)
CHOLEST SERPL-MCNC: 163 MG/DL
CHOLESTEROL/HDL RATIO: 3.3
CO2 SERPL-SCNC: 23 MMOL/L (ref 20–31)
CREAT SERPL-MCNC: 0.69 MG/DL (ref 0.5–0.9)
GFR SERPL CREATININE-BSD FRML MDRD: >60 ML/MIN/1.73M2
GLUCOSE SERPL-MCNC: 62 MG/DL (ref 70–99)
HDLC SERPL-MCNC: 50 MG/DL
LDLC SERPL CALC-MCNC: 82 MG/DL (ref 0–130)
POTASSIUM SERPL-SCNC: 4.8 MMOL/L (ref 3.7–5.3)
PROT SERPL-MCNC: 8.2 G/DL (ref 6.4–8.3)
SODIUM SERPL-SCNC: 145 MMOL/L (ref 135–144)
TRIGL SERPL-MCNC: 157 MG/DL
TSH SERPL-ACNC: 0.67 UIU/ML (ref 0.3–5)

## 2023-09-11 ENCOUNTER — HOSPITAL ENCOUNTER (EMERGENCY)
Age: 43
Discharge: HOME | End: 2023-09-11
Attending: EMERGENCY MEDICINE
Payer: COMMERCIAL

## 2023-09-11 VITALS
OXYGEN SATURATION: 96 % | BODY MASS INDEX: 45.16 KG/M2 | WEIGHT: 230 LBS | TEMPERATURE: 97.7 F | SYSTOLIC BLOOD PRESSURE: 143 MMHG | DIASTOLIC BLOOD PRESSURE: 92 MMHG | HEART RATE: 113 BPM | RESPIRATION RATE: 18 BRPM | HEIGHT: 60 IN

## 2023-09-11 DIAGNOSIS — R11.2 NAUSEA VOMITING AND DIARRHEA: ICD-10-CM

## 2023-09-11 DIAGNOSIS — R19.7 NAUSEA VOMITING AND DIARRHEA: ICD-10-CM

## 2023-09-11 DIAGNOSIS — K52.9 ACUTE GASTROENTERITIS: Primary | ICD-10-CM

## 2023-09-11 LAB
ANION GAP SERPL CALC-SCNC: 19 MEQ/L (ref 8–16)
BACTERIA: ABNORMAL
BASOPHILS ABSOLUTE: 0.1 THOU/MM3 (ref 0–0.1)
BASOPHILS NFR BLD AUTO: 0.6 %
BILIRUB UR QL STRIP: NEGATIVE
BUN SERPL-MCNC: 16 MG/DL (ref 7–22)
CALCIUM SERPL-MCNC: 9.7 MG/DL (ref 8.5–10.5)
CASTS #/AREA URNS LPF: ABNORMAL /LPF
CASTS #/AREA URNS LPF: ABNORMAL /LPF
CHARACTER UR: CLEAR
CHARCOAL URNS QL MICRO: ABNORMAL
CHLORIDE SERPL-SCNC: 95 MEQ/L (ref 98–111)
CO2 SERPL-SCNC: 23 MEQ/L (ref 23–33)
COLOR UR: YELLOW
CREAT SERPL-MCNC: 0.6 MG/DL (ref 0.4–1.2)
CRYSTALS URNS QL MICRO: ABNORMAL
DEPRECATED RDW RBC AUTO: 37.3 FL (ref 35–45)
EOSINOPHIL NFR BLD AUTO: 0.2 %
EOSINOPHILS ABSOLUTE: 0 THOU/MM3 (ref 0–0.4)
EPITHELIAL CELLS, UA: ABNORMAL /HPF
ERYTHROCYTE [DISTWIDTH] IN BLOOD BY AUTOMATED COUNT: 12.3 % (ref 11.5–14.5)
GFR SERPL CREATININE-BSD FRML MDRD: > 60 ML/MIN/1.73M2
GLUCOSE SERPL-MCNC: 312 MG/DL (ref 70–108)
GLUCOSE UR QL STRIP.AUTO: >= 1000 MG/DL
HCT VFR BLD AUTO: 43.1 % (ref 37–47)
HGB BLD-MCNC: 14.3 GM/DL (ref 12–16)
HGB UR QL STRIP.AUTO: ABNORMAL
IMM GRANULOCYTES # BLD AUTO: 0.08 THOU/MM3 (ref 0–0.07)
IMM GRANULOCYTES NFR BLD AUTO: 0.5 %
KETONES UR QL STRIP.AUTO: 15
LEUKOCYTE ESTERASE UR QL STRIP.AUTO: NEGATIVE
LYMPHOCYTES ABSOLUTE: 1.7 THOU/MM3 (ref 1–4.8)
LYMPHOCYTES NFR BLD AUTO: 9.6 %
MCH RBC QN AUTO: 27.8 PG (ref 26–33)
MCHC RBC AUTO-ENTMCNC: 33.2 GM/DL (ref 32.2–35.5)
MCV RBC AUTO: 83.7 FL (ref 81–99)
MONOCYTES ABSOLUTE: 0.3 THOU/MM3 (ref 0.4–1.3)
MONOCYTES NFR BLD AUTO: 1.9 %
NEUTROPHILS NFR BLD AUTO: 87.2 %
NITRITE UR QL STRIP.AUTO: NEGATIVE
NRBC BLD AUTO-RTO: 0 /100 WBC
OSMOLALITY SERPL CALC.SUM OF ELEC: 286.9 MOSMOL/KG (ref 275–300)
PH UR STRIP.AUTO: 6.5 [PH] (ref 5–9)
PLATELET # BLD AUTO: 378 THOU/MM3 (ref 130–400)
PMV BLD AUTO: 9.5 FL (ref 9.4–12.4)
POTASSIUM SERPL-SCNC: 4.9 MEQ/L (ref 3.5–5.2)
PROT UR STRIP.AUTO-MCNC: 300 MG/DL
RBC # BLD AUTO: 5.15 MILL/MM3 (ref 4.2–5.4)
RBC #/AREA URNS HPF: ABNORMAL /HPF
RENAL EPI CELLS #/AREA URNS HPF: ABNORMAL /[HPF]
SEGMENTED NEUTROPHILS ABSOLUTE COUNT: 15.3 THOU/MM3 (ref 1.8–7.7)
SODIUM SERPL-SCNC: 137 MEQ/L (ref 135–145)
SP GR UR REFRACT.AUTO: 1.03 (ref 1–1.03)
UROBILINOGEN UR QL STRIP.AUTO: 1 EU/DL (ref 0–1)
WBC # BLD AUTO: 17.5 THOU/MM3 (ref 4.8–10.8)
WBC #/AREA URNS HPF: ABNORMAL /HPF
YEAST LIKE FUNGI URNS QL MICRO: ABNORMAL

## 2023-09-11 PROCEDURE — 85025 COMPLETE CBC W/AUTO DIFF WBC: CPT

## 2023-09-11 PROCEDURE — 6370000000 HC RX 637 (ALT 250 FOR IP): Performed by: EMERGENCY MEDICINE

## 2023-09-11 PROCEDURE — 36415 COLL VENOUS BLD VENIPUNCTURE: CPT

## 2023-09-11 PROCEDURE — 2580000003 HC RX 258: Performed by: EMERGENCY MEDICINE

## 2023-09-11 PROCEDURE — 6360000002 HC RX W HCPCS: Performed by: EMERGENCY MEDICINE

## 2023-09-11 PROCEDURE — 80048 BASIC METABOLIC PNL TOTAL CA: CPT

## 2023-09-11 PROCEDURE — 6360000002 HC RX W HCPCS: Performed by: STUDENT IN AN ORGANIZED HEALTH CARE EDUCATION/TRAINING PROGRAM

## 2023-09-11 PROCEDURE — A4216 STERILE WATER/SALINE, 10 ML: HCPCS | Performed by: EMERGENCY MEDICINE

## 2023-09-11 PROCEDURE — 96361 HYDRATE IV INFUSION ADD-ON: CPT

## 2023-09-11 PROCEDURE — 6370000000 HC RX 637 (ALT 250 FOR IP): Performed by: STUDENT IN AN ORGANIZED HEALTH CARE EDUCATION/TRAINING PROGRAM

## 2023-09-11 PROCEDURE — 96375 TX/PRO/DX INJ NEW DRUG ADDON: CPT

## 2023-09-11 PROCEDURE — 99284 EMERGENCY DEPT VISIT MOD MDM: CPT

## 2023-09-11 PROCEDURE — 96374 THER/PROPH/DIAG INJ IV PUSH: CPT

## 2023-09-11 PROCEDURE — 81001 URINALYSIS AUTO W/SCOPE: CPT

## 2023-09-11 PROCEDURE — 2500000003 HC RX 250 WO HCPCS: Performed by: EMERGENCY MEDICINE

## 2023-09-11 RX ORDER — SODIUM CHLORIDE, SODIUM LACTATE, POTASSIUM CHLORIDE, AND CALCIUM CHLORIDE .6; .31; .03; .02 G/100ML; G/100ML; G/100ML; G/100ML
1000 INJECTION, SOLUTION INTRAVENOUS ONCE
Status: COMPLETED | OUTPATIENT
Start: 2023-09-11 | End: 2023-09-11

## 2023-09-11 RX ORDER — LOPERAMIDE HYDROCHLORIDE 2 MG/1
2 CAPSULE ORAL 4 TIMES DAILY PRN
Qty: 20 CAPSULE | Refills: 0 | Status: SHIPPED | OUTPATIENT
Start: 2023-09-11 | End: 2023-09-21

## 2023-09-11 RX ORDER — MAGNESIUM HYDROXIDE/ALUMINUM HYDROXICE/SIMETHICONE 120; 1200; 1200 MG/30ML; MG/30ML; MG/30ML
30 SUSPENSION ORAL ONCE
Status: COMPLETED | OUTPATIENT
Start: 2023-09-11 | End: 2023-09-11

## 2023-09-11 RX ORDER — METOPROLOL TARTRATE 50 MG/1
100 TABLET, FILM COATED ORAL ONCE
Status: COMPLETED | OUTPATIENT
Start: 2023-09-11 | End: 2023-09-11

## 2023-09-11 RX ORDER — KETOROLAC TROMETHAMINE 30 MG/ML
15 INJECTION, SOLUTION INTRAMUSCULAR; INTRAVENOUS ONCE
Status: COMPLETED | OUTPATIENT
Start: 2023-09-11 | End: 2023-09-11

## 2023-09-11 RX ORDER — ONDANSETRON 4 MG/1
4 TABLET, ORALLY DISINTEGRATING ORAL 3 TIMES DAILY PRN
Qty: 12 TABLET | Refills: 0 | Status: SHIPPED | OUTPATIENT
Start: 2023-09-11

## 2023-09-11 RX ORDER — SUCRALFATE 1 G/1
1 TABLET ORAL 4 TIMES DAILY
Qty: 40 TABLET | Refills: 0 | Status: SHIPPED | OUTPATIENT
Start: 2023-09-11 | End: 2023-09-21

## 2023-09-11 RX ORDER — LOPERAMIDE HYDROCHLORIDE 2 MG/1
4 CAPSULE ORAL ONCE
Status: COMPLETED | OUTPATIENT
Start: 2023-09-11 | End: 2023-09-11

## 2023-09-11 RX ORDER — ONDANSETRON 2 MG/ML
4 INJECTION INTRAMUSCULAR; INTRAVENOUS ONCE
Status: COMPLETED | OUTPATIENT
Start: 2023-09-11 | End: 2023-09-11

## 2023-09-11 RX ORDER — ONDANSETRON 4 MG/1
4 TABLET, FILM COATED ORAL EVERY 8 HOURS PRN
Qty: 20 TABLET | Refills: 0 | Status: SHIPPED | OUTPATIENT
Start: 2023-09-11 | End: 2023-10-01

## 2023-09-11 RX ADMIN — ALUMINUM HYDROXIDE, MAGNESIUM HYDROXIDE, AND SIMETHICONE 30 ML: 200; 200; 20 SUSPENSION ORAL at 16:36

## 2023-09-11 RX ADMIN — SODIUM CHLORIDE, POTASSIUM CHLORIDE, SODIUM LACTATE AND CALCIUM CHLORIDE 1000 ML: 600; 310; 30; 20 INJECTION, SOLUTION INTRAVENOUS at 15:40

## 2023-09-11 RX ADMIN — FAMOTIDINE 20 MG: 10 INJECTION, SOLUTION INTRAVENOUS at 14:22

## 2023-09-11 RX ADMIN — LOPERAMIDE HYDROCHLORIDE 4 MG: 2 CAPSULE ORAL at 14:22

## 2023-09-11 RX ADMIN — METOPROLOL TARTRATE 100 MG: 50 TABLET, FILM COATED ORAL at 16:58

## 2023-09-11 RX ADMIN — ONDANSETRON 4 MG: 2 INJECTION INTRAMUSCULAR; INTRAVENOUS at 13:57

## 2023-09-11 RX ADMIN — KETOROLAC TROMETHAMINE 15 MG: 30 INJECTION, SOLUTION INTRAMUSCULAR at 16:59

## 2023-09-11 RX ADMIN — SODIUM CHLORIDE, POTASSIUM CHLORIDE, SODIUM LACTATE AND CALCIUM CHLORIDE 1000 ML: 600; 310; 30; 20 INJECTION, SOLUTION INTRAVENOUS at 13:59

## 2023-09-11 ASSESSMENT — PAIN SCALES - GENERAL
PAINLEVEL_OUTOF10: 7
PAINLEVEL_OUTOF10: 3
PAINLEVEL_OUTOF10: 4

## 2023-09-11 ASSESSMENT — PAIN DESCRIPTION - LOCATION: LOCATION: ABDOMEN

## 2023-09-11 ASSESSMENT — PAIN - FUNCTIONAL ASSESSMENT
PAIN_FUNCTIONAL_ASSESSMENT: 0-10
PAIN_FUNCTIONAL_ASSESSMENT: NONE - DENIES PAIN
PAIN_FUNCTIONAL_ASSESSMENT: 0-10
PAIN_FUNCTIONAL_ASSESSMENT: 0-10

## 2023-09-11 NOTE — ED NOTES
Patient resting in bed. Respirations easy and unlabored. No distress noted. Call light within reach.        Rubens Dalton RN  09/11/23 6933

## 2023-09-11 NOTE — ED TRIAGE NOTES
Presents to ED with c/o nausea, vomiting, and diarrhea that started 30 minutes after eating chicken last night. Alert and oriented. Respirations easy and unlabored.

## 2023-09-11 NOTE — ED NOTES
Patient resting in bed. Respirations easy and unlabored. No distress noted. Call light within reach.        Neftali Moore RN  09/11/23 0500

## 2023-09-11 NOTE — ED NOTES
Pt updated on POC, verbalized understanding. Pt states \"my heart rate is always this high. \"     Cornelio Yeager RN  09/11/23 9773

## 2023-09-11 NOTE — ED NOTES
Patient resting in bed. Respirations easy and unlabored. No distress noted. Call light within reach.        Kylie Lowe RN  09/11/23 1400

## 2023-09-11 NOTE — ED NOTES
Patient resting in bed. Respirations easy and unlabored. No distress noted. Call light within reach.        Gretchen Greenwood RN  09/11/23 3504

## 2023-09-11 NOTE — ED PROVIDER NOTES
[facility] EMERGENCY DEPT  Emergency Department     Care of Vlad Carvalho was assumed from previous ED provider. The patient's initial evaluation and plan have been discussed with the ED prior provider who initially cared for the patient . All pertinent data has been reviewed. Time of signout is 4:00pm.    This patient is a 40 y.o. Female with nausea, vomiting, diarrhea    On my examination, patient appears more comfortable, states that she is feeling better after medications. EMERGENCY DEPARTMENT COURSE / MEDICAL DECISION MAKING:       MEDICATIONS GIVEN:  Orders Placed This Encounter   Medications    lactated ringers bolus bolus 1,000 mL    ondansetron (ZOFRAN) injection 4 mg    famotidine (PEPCID) 20 mg in sodium chloride (PF) 0.9 % 10 mL injection    loperamide (IMODIUM) capsule 4 mg    lactated ringers bolus bolus 1,000 mL    aluminum & magnesium hydroxide-simethicone (MAALOX) 200-200-20 MG/5ML suspension 30 mL    sucralfate (CARAFATE) 1 GM tablet     Sig: Take 1 tablet by mouth 4 times daily for 10 days     Dispense:  40 tablet     Refill:  0    ondansetron (ZOFRAN) 4 MG tablet     Sig: Take 1 tablet by mouth every 8 hours as needed for Nausea     Dispense:  20 tablet     Refill:  0    loperamide (RA ANTI-DIARRHEAL) 2 MG capsule     Sig: Take 1 capsule by mouth 4 times daily as needed for Diarrhea Take 1 capsule after every bowel movement, maximum of 4 capsules per day. Discontinue as diarrhea improves.      Dispense:  20 capsule     Refill:  0    metoprolol tartrate (LOPRESSOR) tablet 100 mg    ketorolac (TORADOL) injection 15 mg     LABS / RADIOLOGY:     Results for orders placed or performed during the hospital encounter of 09/11/23   BMP   Result Value Ref Range    Sodium 137 135 - 145 meq/L    Potassium 4.9 3.5 - 5.2 meq/L    Chloride 95 (L) 98 - 111 meq/L    CO2 23 23 - 33 meq/L    Glucose 312 (H) 70 - 108 mg/dL    BUN 16 7 - 22 mg/dL    Creatinine 0.6 0.4 - 1.2 mg/dL    Calcium 9.7 8.5 -
Cardiology department, final interpretation may not be available as of the writing of this note. MEDICAL DECISION MAKING   Initial Assessment Summary:   40years old female with nausea, vomiting, diarrhea, epigastric pain after eating chicken, currently with mild hydration. Please see ED course section below for continuation and resolution of this initial assessment if applicable. Comorbid conditions pertinent to this ED encounter:  Not applicable      Differential Diagnosis includes but is not limited to:  Gastroenteritis  Dehydration      Decision Rules/Clinical Scores utilized:  Not Applicable. Plan:   IV line, labs  IV fluids  Symptomatic treatment  Observation in the ED while awaiting results      Code Status:  Not addressed during this ED visit    Social determinants of health impacting treatment or disposition:  Health literacy      PREVIOUS RECORDS  AND EXTERNAL INFORMATION REVIEWED   History obtained from: chart review and the patient. Pertinent previous and/or external records reviewed: Noncontributory.     Case discussed with specialties other than Emergency Medicine: Not Applicable      ED COURSE   ED Medications administered this visit (None if left blank):   Medications   lactated ringers bolus bolus 1,000 mL (1,000 mLs IntraVENous New Bag 9/11/23 1359)   lactated ringers bolus bolus 1,000 mL (1,000 mLs IntraVENous New Bag 9/11/23 1540)   aluminum & magnesium hydroxide-simethicone (MAALOX) 200-200-20 MG/5ML suspension 30 mL (has no administration in time range)   ondansetron (ZOFRAN) injection 4 mg (4 mg IntraVENous Given 9/11/23 1357)   famotidine (PEPCID) 20 mg in sodium chloride (PF) 0.9 % 10 mL injection (20 mg IntraVENous Given 9/11/23 1422)   loperamide (IMODIUM) capsule 4 mg (4 mg Oral Given 9/11/23 1422)                PROCEDURES: (None if blank)  Procedures:       MEDICATION CHANGES     New Prescriptions    LOPERAMIDE (RA ANTI-DIARRHEAL) 2 MG CAPSULE    Take 1 capsule by mouth

## 2023-09-11 NOTE — ED NOTES
Upon first contact with patient this RN receives bedside shift report from [facility]. Pt resting quietly in bed. Pt placed on telemetry. Pt updated on POC, verbalized understanding. Call light in reach.         Nito Yeager RN  09/11/23 8368

## 2023-09-11 NOTE — ED NOTES
Pt updated on POC at this time. Pt on telemetry. Call light in reach.  Pt denies any new needs or concerns at this time     Dionne Garza RN  09/11/23 4234

## 2023-09-11 NOTE — ED NOTES
Pt to bathroom with x1 assist and wheelchair. Pt updated on POC, verbalized understanding. Pt on telemetry. Call light in reach.       Ant Moss RN  09/11/23 5785

## 2024-10-23 ENCOUNTER — HOSPITAL ENCOUNTER (EMERGENCY)
Age: 44
Discharge: HOME | End: 2024-10-23
Payer: MEDICAID

## 2024-10-23 VITALS
HEART RATE: 99 BPM | TEMPERATURE: 99.4 F | RESPIRATION RATE: 20 BRPM | SYSTOLIC BLOOD PRESSURE: 159 MMHG | DIASTOLIC BLOOD PRESSURE: 82 MMHG | OXYGEN SATURATION: 100 %

## 2024-10-23 DIAGNOSIS — H66.001 ACUTE SUPPURATIVE OTITIS MEDIA OF RIGHT EAR WITHOUT SPONTANEOUS RUPTURE OF TYMPANIC MEMBRANE, RECURRENCE NOT SPECIFIED: ICD-10-CM

## 2024-10-23 DIAGNOSIS — H92.01 OTALGIA OF RIGHT EAR: ICD-10-CM

## 2024-10-23 DIAGNOSIS — H60.391 INFECTIVE OTITIS EXTERNA OF RIGHT EAR: Primary | ICD-10-CM

## 2024-10-23 PROCEDURE — 99283 EMERGENCY DEPT VISIT LOW MDM: CPT

## 2024-10-23 RX ORDER — AMOXICILLIN 500 MG/1
500 CAPSULE ORAL 3 TIMES DAILY
Qty: 30 CAPSULE | Refills: 0 | Status: SHIPPED | OUTPATIENT
Start: 2024-10-23 | End: 2024-10-23

## 2024-10-23 RX ORDER — ETODOLAC 300 MG/1
300 CAPSULE ORAL EVERY 8 HOURS
Qty: 30 CAPSULE | Refills: 0 | Status: SHIPPED | OUTPATIENT
Start: 2024-10-23 | End: 2024-10-23

## 2024-10-23 RX ORDER — AMOXICILLIN 500 MG/1
500 CAPSULE ORAL 3 TIMES DAILY
Qty: 30 CAPSULE | Refills: 0 | Status: SHIPPED | OUTPATIENT
Start: 2024-10-23 | End: 2024-11-02

## 2024-10-23 RX ORDER — OFLOXACIN 3 MG/ML
5 SOLUTION AURICULAR (OTIC) 2 TIMES DAILY
Qty: 5 ML | Refills: 0 | Status: SHIPPED | OUTPATIENT
Start: 2024-10-23 | End: 2024-11-02

## 2024-10-23 RX ORDER — ETODOLAC 300 MG/1
300 CAPSULE ORAL EVERY 8 HOURS
Qty: 30 CAPSULE | Refills: 0 | Status: SHIPPED | OUTPATIENT
Start: 2024-10-23

## 2024-10-23 RX ORDER — OFLOXACIN 3 MG/ML
5 SOLUTION AURICULAR (OTIC) 2 TIMES DAILY
Qty: 5 ML | Refills: 0 | Status: SHIPPED | OUTPATIENT
Start: 2024-10-23 | End: 2024-10-23

## 2024-10-23 NOTE — ED PROVIDER NOTES
The patient was seen and examined. Appropriate diagnostic testing was performed and results reviewed with the patient.      The results of pertinent diagnostic studies and exam findings were discussed.     ED Medications administered this visit:  (None if blank)  Medications - No data to display      PROCEDURES: (None if blank)      CRITICAL CARE: (None if blank)      DISCHARGE PRESCRIPTIONS: (None if blank)  Discharge Medication List as of 10/23/2024  4:14 PM        START taking these medications    Details   etodolac (LODINE) 300 MG capsule Take 1 capsule by mouth in the morning and 1 capsule at noon and 1 capsule in the evening., Disp-30 capsule, R-0Normal      amoxicillin (AMOXIL) 500 MG capsule Take 1 capsule by mouth 3 times daily for 10 days, Disp-30 capsule, R-0Normal      ofloxacin (FLOXIN) 0.3 % otic solution Place 5 drops into the right ear 2 times daily for 10 days, Disp-5 mL, R-0Normal             FINAL IMPRESSION      1. Infective otitis externa of right ear    2. Otalgia of right ear    3. Acute suppurative otitis media of right ear without spontaneous rupture of tympanic membrane, recurrence not specified          DISPOSITION/PLAN   DISPOSITION Decision To Discharge 10/23/2024 04:07:14 PM           OUTPATIENT FOLLOW UP THE PATIENT:  No follow-up provider specified.    SUGEY Lopez Jeremy R, PA  10/23/24 1829

## 2025-04-08 ENCOUNTER — HOSPITAL ENCOUNTER (EMERGENCY)
Age: 45
Discharge: HOME | End: 2025-04-08
Payer: MEDICAID

## 2025-04-08 ENCOUNTER — APPOINTMENT (OUTPATIENT)
Dept: GENERAL RADIOLOGY | Age: 45
End: 2025-04-08
Payer: MEDICAID

## 2025-04-08 VITALS
HEART RATE: 99 BPM | SYSTOLIC BLOOD PRESSURE: 156 MMHG | OXYGEN SATURATION: 98 % | RESPIRATION RATE: 19 BRPM | DIASTOLIC BLOOD PRESSURE: 83 MMHG | TEMPERATURE: 98.9 F

## 2025-04-08 DIAGNOSIS — M79.604 RIGHT LEG PAIN: Primary | ICD-10-CM

## 2025-04-08 PROCEDURE — 73564 X-RAY EXAM KNEE 4 OR MORE: CPT

## 2025-04-08 PROCEDURE — 99283 EMERGENCY DEPT VISIT LOW MDM: CPT

## 2025-04-08 PROCEDURE — 73552 X-RAY EXAM OF FEMUR 2/>: CPT

## 2025-04-08 PROCEDURE — 73502 X-RAY EXAM HIP UNI 2-3 VIEWS: CPT

## 2025-04-08 RX ORDER — HYDROCODONE BITARTRATE AND ACETAMINOPHEN 5; 325 MG/1; MG/1
1 TABLET ORAL EVERY 6 HOURS PRN
Qty: 12 TABLET | Refills: 0 | Status: SHIPPED | OUTPATIENT
Start: 2025-04-08 | End: 2025-04-11

## 2025-04-08 RX ORDER — HYDROCODONE BITARTRATE AND ACETAMINOPHEN 5; 325 MG/1; MG/1
1 TABLET ORAL EVERY 6 HOURS PRN
Qty: 12 TABLET | Refills: 0 | Status: SHIPPED | OUTPATIENT
Start: 2025-04-08 | End: 2025-04-08

## 2025-04-08 NOTE — ED TRIAGE NOTES
Pt to ED for eval after slipping two days ago and thinks she may have pulled a muscle. Pt in stable condition

## 2025-04-08 NOTE — ED PROVIDER NOTES
[facility] EMERGENCY DEPARTMENT      EMERGENCY MEDICINE     Pt Name: Althea Castañeda  MRN: [id number]  Birthdate 1980  Date of evaluation: 4/8/2025  Provider: ROBBIE Christian CNP    CHIEF COMPLAINT       Chief Complaint   Patient presents with    Leg Pain     right     HISTORY OF PRESENT ILLNESS   Althea Castañeda is a pleasant 44 y.o. female with a past medical history of anxiety, depression, diabetes, hyperlipidemia, hypertension.  She presents with complaint of right hip and leg pain status post injury 2 days ago.  Patient was leaning forward to pick something off the floor.  She states \"my knee twisted out\" and is now complaining of pain from the right hip to just distal to the right knee.  She did not actually fall.  Denies any numbness or tingling of the foot or toes of the affected extremity.  No other injuries or complaints.    PASTMEDICAL HISTORY     Past Medical History:   Diagnosis Date    Anxiety     Depression     Diabetes mellitus ([facility])     Hyperlipidemia     Hypertension        Patient Active Problem List   Diagnosis Code    Gastroenteritis K52.9    Abdominal pain, other specified site R10.9    Influenza J11.1    Tachycardia R00.0    Sepsis ([facility]) A41.9    Type 2 diabetes mellitus without complication, with long-term current use of insulin E11.9, Z79.4    Essential hypertension I10    Sinus tachycardia R00.0    Morbid obesity due to excess calories E66.01    Intermittent palpitations R00.2    Abnormal Holter exam HR average is 118 bpm R94.31    Inappropriate sinus tachycardia I47.11    Acute respiratory failure with hypoxia J96.01    Pneumonia due to infectious organism J18.9    JACIEL and COPD overlap syndrome ([facility]) G47.33, J44.9    Diabetic polyneuropathy ([facility]) E11.42    Moderate mixed bipolar I disorder ([facility]) F31.62    Anxiety disorder F41.9    Polyneuropathy G62.9     SURGICAL HISTORY       Past Surgical History:   Procedure Laterality Date    ABSCESS DRAINAGE      COLOSTOMY CLOSURE

## 2025-05-01 ENCOUNTER — OFFICE VISIT (OUTPATIENT)
Dept: ENT CLINIC | Age: 45
End: 2025-05-01

## 2025-05-01 VITALS
HEART RATE: 114 BPM | WEIGHT: 253.3 LBS | HEIGHT: 60 IN | BODY MASS INDEX: 49.73 KG/M2 | SYSTOLIC BLOOD PRESSURE: 138 MMHG | TEMPERATURE: 97.2 F | OXYGEN SATURATION: 94 % | RESPIRATION RATE: 18 BRPM | DIASTOLIC BLOOD PRESSURE: 86 MMHG

## 2025-05-01 DIAGNOSIS — J38.3 VOCAL FOLD ATROPHY: ICD-10-CM

## 2025-05-01 DIAGNOSIS — R13.10 DYSPHAGIA, UNSPECIFIED TYPE: ICD-10-CM

## 2025-05-01 DIAGNOSIS — J04.0 REFLUX LARYNGITIS: Primary | ICD-10-CM

## 2025-05-01 DIAGNOSIS — K21.9 REFLUX LARYNGITIS: Primary | ICD-10-CM

## 2025-05-01 RX ORDER — INSULIN GLARGINE 100 [IU]/ML
INJECTION, SOLUTION SUBCUTANEOUS
COMMUNITY
Start: 2025-04-18

## 2025-05-01 RX ORDER — DOCUSATE SODIUM 100 MG/1
CAPSULE, LIQUID FILLED ORAL
COMMUNITY
Start: 2025-04-18

## 2025-05-01 RX ORDER — GLUCOSAM/CHON-MSM1/C/MANG/BOSW 500-416.6
TABLET ORAL
COMMUNITY
Start: 2025-04-18

## 2025-05-01 RX ORDER — CALCIUM CITRATE/VITAMIN D3 200MG-6.25
TABLET ORAL
COMMUNITY
Start: 2025-04-18

## 2025-05-01 RX ORDER — LISINOPRIL AND HYDROCHLOROTHIAZIDE 20; 25 MG/1; MG/1
1 TABLET ORAL DAILY
COMMUNITY
Start: 2025-04-18

## 2025-05-01 RX ORDER — ERGOCALCIFEROL 1.25 MG/1
50000 CAPSULE, LIQUID FILLED ORAL WEEKLY
COMMUNITY
Start: 2025-04-18

## 2025-05-01 RX ORDER — DULAGLUTIDE 3 MG/.5ML
INJECTION, SOLUTION SUBCUTANEOUS
COMMUNITY
Start: 2025-04-18

## 2025-05-01 RX ORDER — AMITRIPTYLINE HYDROCHLORIDE 100 MG/1
TABLET ORAL
COMMUNITY
Start: 2025-04-18

## 2025-05-01 RX ORDER — MELOXICAM 15 MG/1
15 TABLET ORAL DAILY
COMMUNITY
Start: 2025-04-18

## 2025-05-01 RX ORDER — PSYLLIUM HUSK 0.4 G
CAPSULE ORAL
COMMUNITY
Start: 2025-04-18

## 2025-05-01 RX ORDER — PANTOPRAZOLE SODIUM 40 MG/1
40 TABLET, DELAYED RELEASE ORAL
Qty: 90 TABLET | Refills: 1 | Status: SHIPPED | OUTPATIENT
Start: 2025-05-01

## 2025-05-01 RX ORDER — ARIPIPRAZOLE 20 MG/1
20 TABLET ORAL DAILY
COMMUNITY
Start: 2025-04-18

## 2025-05-01 RX ORDER — ASPIRIN 81 MG/1
81 TABLET, COATED ORAL DAILY
COMMUNITY
Start: 2025-04-18

## 2025-05-01 NOTE — PROGRESS NOTES
[facility] [place] [facility]  [facility]  [address]   [address]  [facility] [address]  Dept: [phone]  Dept Fax: [phone]  Loc: [phone]    Althea Castañeda is a 44 y.o. female who was referred byAna María Cortes, APR* for:  Chief Complaint   Patient presents with    New Patient     New patient  here for dysphagia and voice concerns. Patient stated that she is a little hoarse and has been dealing with the dysphagia and hoarseness for 3 months now.   .    HPI:     Althea Castañeda is a 44 y.o. female who presents today for dysphagia and voice concerns. .    History:     Allergies   Allergen Reactions    Codeine Hives     Current Outpatient Medications   Medication Sig Dispense Refill    TRULICITY 3 MG/0.5ML SOAJ INJECT 0.5 MILLILITERS UNDER THE SKIN ONCE A WEEK      docusate sodium (COLACE) 100 MG capsule TAKE TWO CAPSULES BY MOUTH ONCE DAILY AS NEEDED      amitriptyline (ELAVIL) 100 MG tablet TAKE TWO TABLETS BY MOUTH EVERY NIGHT AT BEDTIME      vitamin D (ERGOCALCIFEROL) 1.25 MG (92727 UT) CAPS capsule Take 1 capsule by mouth Once a week at 5 PM      esomeprazole (NEXIUM) 20 MG delayed release capsule Take 1 capsule by mouth daily      TRUE METRIX BLOOD GLUCOSE TEST strip USE TO TEST BLOOD SUGAR 4 TIMES DAILY      TRUEplus Lancets 28G MISC USE TO CHECK BLOOD SUGARS FOUR TIMES DAILY      lisinopril-hydroCHLOROthiazide (PRINZIDE;ZESTORETIC) 20-25 MG per tablet Take 1 tablet by mouth daily      MELATONIN MAXIMUM STRENGTH 5 MG TABS tablet TAKE TWO TABLETS BY MOUTH ONCE DAILY AT BEDTIME      meloxicam (MOBIC) 15 MG tablet Take 1 tablet by mouth daily      ARIPiprazole (ABILIFY) 20 MG tablet Take 1 tablet by mouth daily      ASPIRIN LOW DOSE 81 MG EC tablet Take 1 tablet by mouth daily      LANTUS SOLOSTAR 100 UNIT/ML injection pen INJECT 70 UNITS UNDER THE SKIN ONCE DAILY      pantoprazole (PROTONIX) 40 MG tablet Take 1 tablet by mouth every morning (before

## 2025-05-27 ENCOUNTER — HOSPITAL ENCOUNTER (OUTPATIENT)
Dept: GENERAL RADIOLOGY | Age: 45
Discharge: HOME | End: 2025-05-27
Attending: OTOLARYNGOLOGY
Payer: MEDICAID

## 2025-05-27 DIAGNOSIS — R13.10 DYSPHAGIA, UNSPECIFIED TYPE: ICD-10-CM

## 2025-05-27 PROCEDURE — 2500000003 HC RX 250 WO HCPCS: Performed by: OTOLARYNGOLOGY

## 2025-05-27 PROCEDURE — 74220 X-RAY XM ESOPHAGUS 1CNTRST: CPT

## 2025-05-27 PROCEDURE — 6370000000 HC RX 637 (ALT 250 FOR IP): Performed by: OTOLARYNGOLOGY

## 2025-05-27 RX ADMIN — BARIUM SULFATE 140 ML: 980 POWDER, FOR SUSPENSION ORAL at 10:06

## 2025-05-27 RX ADMIN — BARIUM SULFATE 100 ML: 0.6 SUSPENSION ORAL at 10:06

## 2025-05-27 RX ADMIN — ANTACID/ANTIFLATULENT 1 EACH: 380; 550; 10; 10 GRANULE, EFFERVESCENT ORAL at 10:06

## 2025-05-27 RX ADMIN — BARIUM SULFATE 1 TABLET: 700 TABLET ORAL at 10:07
